# Patient Record
Sex: FEMALE | Race: WHITE | Employment: OTHER | URBAN - METROPOLITAN AREA
[De-identification: names, ages, dates, MRNs, and addresses within clinical notes are randomized per-mention and may not be internally consistent; named-entity substitution may affect disease eponyms.]

---

## 2017-01-13 NOTE — TELEPHONE ENCOUNTER
Refill request for methocarbamol 750 mg, take 2 tabs every 8 hrs as needed, #180, no refills    LOV: 12/19/16  NOV: 3/27/17  Last refilled on 12/16/16

## 2017-01-16 ENCOUNTER — TELEPHONE (OUTPATIENT)
Dept: NEUROLOGY | Facility: CLINIC | Age: 52
End: 2017-01-16

## 2017-01-16 DIAGNOSIS — G89.4 CHRONIC PAIN SYNDROME: ICD-10-CM

## 2017-01-16 DIAGNOSIS — M96.1 LUMBAR POST-LAMINECTOMY SYNDROME: Primary | ICD-10-CM

## 2017-01-16 DIAGNOSIS — M70.62 GREATER TROCHANTERIC BURSITIS OF LEFT HIP: ICD-10-CM

## 2017-01-16 DIAGNOSIS — M25.512 CHRONIC LEFT SHOULDER PAIN: ICD-10-CM

## 2017-01-16 DIAGNOSIS — M75.112 INCOMPLETE TEAR OF LEFT ROTATOR CUFF: ICD-10-CM

## 2017-01-16 DIAGNOSIS — M51.9 LUMBAR DISC DISEASE: ICD-10-CM

## 2017-01-16 DIAGNOSIS — M51.26 LUMBAR HERNIATED DISC: ICD-10-CM

## 2017-01-16 DIAGNOSIS — M54.42 CHRONIC LEFT-SIDED LOW BACK PAIN WITH LEFT-SIDED SCIATICA: ICD-10-CM

## 2017-01-16 DIAGNOSIS — G89.29 CHRONIC LEFT-SIDED LOW BACK PAIN WITH LEFT-SIDED SCIATICA: ICD-10-CM

## 2017-01-16 DIAGNOSIS — M48.061 LUMBAR STENOSIS: ICD-10-CM

## 2017-01-16 DIAGNOSIS — G89.29 CHRONIC LEFT SHOULDER PAIN: ICD-10-CM

## 2017-01-16 DIAGNOSIS — M54.16 LUMBAR RADICULOPATHY: ICD-10-CM

## 2017-01-16 RX ORDER — METHOCARBAMOL 750 MG/1
TABLET, FILM COATED ORAL
Qty: 180 TABLET | Refills: 0 | Status: SHIPPED | OUTPATIENT
Start: 2017-01-16 | End: 2017-02-11

## 2017-01-16 NOTE — TELEPHONE ENCOUNTER
Stefan Vines would like to be able to  scripts for 2-3 months of HYDROcodone-acetaminophen . Dr Jacqueline Padron does approve multiple months for this patient.

## 2017-01-16 NOTE — TELEPHONE ENCOUNTER
#1Drug Norco 10-325mg q6hr prn pain #120-30 3 scripts Three month of scripts provided  Fill date 01/19/17.   Last refill 12/19/16     Last office visit 12/19/16    Next appointment 03/31/17    [place]    #2 Drug Norco 10-325mg q 6hr prn pain #120-

## 2017-01-17 RX ORDER — HYDROCODONE BITARTRATE AND ACETAMINOPHEN 10; 325 MG/1; MG/1
1 TABLET ORAL EVERY 6 HOURS PRN
Qty: 120 TABLET | Refills: 0 | Status: SHIPPED | OUTPATIENT
Start: 2017-02-18 | End: 2017-03-31

## 2017-01-17 RX ORDER — HYDROCODONE BITARTRATE AND ACETAMINOPHEN 10; 325 MG/1; MG/1
1 TABLET ORAL EVERY 6 HOURS PRN
Qty: 120 TABLET | Refills: 0 | Status: SHIPPED | OUTPATIENT
Start: 2017-03-20 | End: 2017-01-27

## 2017-01-17 RX ORDER — HYDROCODONE BITARTRATE AND ACETAMINOPHEN 10; 325 MG/1; MG/1
1 TABLET ORAL EVERY 6 HOURS PRN
Qty: 120 TABLET | Refills: 0 | Status: SHIPPED | OUTPATIENT
Start: 2017-01-19 | End: 2017-03-31

## 2017-01-27 ENCOUNTER — OFFICE VISIT (OUTPATIENT)
Dept: SURGERY | Facility: CLINIC | Age: 52
End: 2017-01-27

## 2017-01-27 VITALS
SYSTOLIC BLOOD PRESSURE: 118 MMHG | HEART RATE: 108 BPM | BODY MASS INDEX: 18.4 KG/M2 | WEIGHT: 100 LBS | HEIGHT: 62 IN | RESPIRATION RATE: 18 BRPM | DIASTOLIC BLOOD PRESSURE: 70 MMHG

## 2017-01-27 DIAGNOSIS — G89.29 OTHER CHRONIC PAIN: ICD-10-CM

## 2017-01-27 DIAGNOSIS — M96.1 FAILED BACK SURGICAL SYNDROME: Primary | ICD-10-CM

## 2017-01-27 DIAGNOSIS — Z98.890 S/P INSERTION OF INTRATHECAL PUMP: ICD-10-CM

## 2017-01-27 PROCEDURE — 99214 OFFICE O/P EST MOD 30 MIN: CPT | Performed by: PHYSICIAN ASSISTANT

## 2017-01-27 RX ORDER — CHLORHEXIDINE GLUCONATE 0.12 MG/ML
RINSE ORAL
COMMUNITY
Start: 2017-01-06 | End: 2017-03-31 | Stop reason: ALTCHOICE

## 2017-01-27 NOTE — PROGRESS NOTES
Name: Mariano Grande   : 10/21/1965   DOS: 2017     Pain Clinic Follow Up Visit:   Patient presents with: Follow - Up: pump adj      Mariano Grande is a 46year old female who presents for recheck of chronic pain.   Patient has history of failed Oral Tab Take 1 tablet by mouth daily. Disp:  Rfl:    Multiple Vitamin (MULTI-VITAMINS) Oral Tab Take 1 tablet by mouth daily.    Disp:  Rfl:          EXAM:   /70 mmHg  Pulse 108  Resp 18  Ht 62\"  Wt 100 lb  BMI 18.29 kg/m2  General:  Patient is a(n)

## 2017-01-27 NOTE — PATIENT INSTRUCTIONS
Refill policies:    • Allow 2 business days for refills; controlled substances may take longer.   • Contact your pharmacy at least 5 days prior to running out of medication and have them send an electronic request or submit request through the “request re your physician has recommended that you have a procedure or additional testing performed. [facility]) will contact your insurance carrier to obtain pre-certification or prior authorization.     Unfortunately, NATTY has seen an increas

## 2017-02-13 RX ORDER — DIAZEPAM 10 MG/1
TABLET ORAL
Qty: 120 TABLET | Refills: 0 | OUTPATIENT
Start: 2017-02-13 | End: 2017-06-06

## 2017-02-13 RX ORDER — METHOCARBAMOL 750 MG/1
TABLET, FILM COATED ORAL
Qty: 180 TABLET | Refills: 0 | Status: SHIPPED | OUTPATIENT
Start: 2017-02-13 | End: 2017-03-12

## 2017-02-13 NOTE — TELEPHONE ENCOUNTER
Methocarbamol 750mg #180 approved by Dr. Teresa Huerta and e-scribed to [facility]  Diazepam 10mg #120 r-0 approved by Dr. Teresa Huerta and called in to [facility]

## 2017-02-13 NOTE — TELEPHONE ENCOUNTER
Refill request for diazepam 10 mg, QID PRN, #120, no refills -- last [place]   Refill request for methocarbamol 750 mg, take 2 tabs every 8 hrs as needed, #180, no refills    LOV: 12/19/16  NOV: 3/31/17

## 2017-02-21 ENCOUNTER — TELEPHONE (OUTPATIENT)
Dept: NEUROLOGY | Facility: CLINIC | Age: 52
End: 2017-02-21

## 2017-02-22 NOTE — TELEPHONE ENCOUNTER
Received notice from [facility] that Methocarbamol is non formulary on the insurance plan.   Current formulary alternatives are Cyclobenzaprine, Baclofen and Tizanidine tabs    Patient informed of the above and says Cyclobenzaprine gives patient upset sto

## 2017-03-02 ENCOUNTER — TELEPHONE (OUTPATIENT)
Dept: SURGERY | Facility: CLINIC | Age: 52
End: 2017-03-02

## 2017-03-02 DIAGNOSIS — G89.29 OTHER CHRONIC PAIN: Primary | ICD-10-CM

## 2017-03-03 NOTE — TELEPHONE ENCOUNTER
Spoke w/ Destin Griffin rep and informed of procedure date and time and request for his presence due to concentration change. Maksim Velásquez confirmed date and time of procedure, no further needs.

## 2017-03-03 NOTE — TELEPHONE ENCOUNTER
Case request placed in OR schedule for 3/9/17 @ 2:45pm arriving at 1:45pm.  [employer] rep notified. Rx faxed to compounded. Hard copy over-nighted to pharmacy by .   Pt to be called and informed of procedure date confirmation and instruc

## 2017-03-03 NOTE — TELEPHONE ENCOUNTER
Pt is on IT morphine: 10mg/ml morphine concentration: dose is currently 2.603 mg continuous dosing with PTMs to 8 per day of 0.100 mg for total dose of 3.388 mg/day. Please make sure mitzi is there for this pump refill as we are changing the concentration.

## 2017-03-03 NOTE — TELEPHONE ENCOUNTER
Patient made aware of date and time of ITP Refill. Pre-procedural instructions reviewed. Patient verbalized understanding. No further needs.

## 2017-03-03 NOTE — TELEPHONE ENCOUNTER
Spoke w/ pt regarding pump refill. Pt states she did not f/u with Dr. Malachi Diaz office for pump d/t \"change in management\" and that Dr. Jim Fang was to [facility] refill. \"  Pt states her pump now says 3/13/17 as alarm date.   Informed pt of refill process and that

## 2017-03-09 ENCOUNTER — HOSPITAL ENCOUNTER (OUTPATIENT)
Facility: HOSPITAL | Age: 52
Setting detail: HOSPITAL OUTPATIENT SURGERY
Discharge: HOME | End: 2017-03-09
Attending: ANESTHESIOLOGY | Admitting: ANESTHESIOLOGY
Payer: MEDICARE

## 2017-03-09 ENCOUNTER — SURGERY (OUTPATIENT)
Age: 52
End: 2017-03-09

## 2017-03-09 VITALS
SYSTOLIC BLOOD PRESSURE: 137 MMHG | DIASTOLIC BLOOD PRESSURE: 69 MMHG | TEMPERATURE: 98 F | HEART RATE: 83 BPM | RESPIRATION RATE: 18 BRPM | OXYGEN SATURATION: 96 %

## 2017-03-09 DIAGNOSIS — G89.29 OTHER CHRONIC PAIN: ICD-10-CM

## 2017-03-09 PROCEDURE — 4B01XVZ MEASUREMENT OF PERIPHERAL NERVOUS STIMULATOR, EXTERNAL APPROACH: ICD-10-PCS | Performed by: ANESTHESIOLOGY

## 2017-03-09 RX ORDER — LIDOCAINE HYDROCHLORIDE 10 MG/ML
INJECTION, SOLUTION EPIDURAL; INFILTRATION; INTRACAUDAL; PERINEURAL AS NEEDED
Status: DISCONTINUED | OUTPATIENT
Start: 2017-03-09 | End: 2017-03-09 | Stop reason: HOSPADM

## 2017-03-09 NOTE — OPERATIVE REPORT
[facility]  Operative Report  3/9/2017     Sergei Miller Patient Status:  Hospital Outpatient Surgery    10/21/1965 MRN [id number]   Location [address] [facility] Attending Barney Tiwari MD   Hosp Day # 0 PCP MD Priya Palmer

## 2017-03-09 NOTE — H&P
History & Physical Examination    Patient Name: Arnol Gaviria  MRN: [id number]  CSN: [id number]  YOB: 1965    Pre-Operative Diagnosis:  Other chronic pain [G89.29]    Present Illness: A 46year old female with chronic pain is here for IT pu • Borderline diabetic    • Fibromyalgia    • Neuropathy (HCC)      hands, left leg worse than right   • Visual impairment      contacts/glasses   • Back problem    • Osteoarthritis      everywhere including tail bone         Past Surgical History    BACK Cancer Mother    • Diabetes Mother    • Arthritis Mother    • [place] Mother      shi's syndrom   • other[other] [OTHER] Mother      CREST syndrome   • Alcohol and Other Disorders Associated Brother    • Diabetes Other      Family h/o Diabe

## 2017-03-13 NOTE — TELEPHONE ENCOUNTER
Refill request for methocarbamol 750 mg, take 2 tabs every 8 hrs as needed, #180, no refills    LOV: 12/19/16  NOV: 3/31/17

## 2017-03-14 RX ORDER — METHOCARBAMOL 750 MG/1
TABLET, FILM COATED ORAL
Qty: 180 TABLET | Refills: 0 | Status: SHIPPED | OUTPATIENT
Start: 2017-03-14 | End: 2017-04-11

## 2017-03-31 ENCOUNTER — OFFICE VISIT (OUTPATIENT)
Dept: NEUROLOGY | Facility: CLINIC | Age: 52
End: 2017-03-31

## 2017-03-31 VITALS
DIASTOLIC BLOOD PRESSURE: 66 MMHG | HEART RATE: 107 BPM | HEIGHT: 62 IN | SYSTOLIC BLOOD PRESSURE: 114 MMHG | RESPIRATION RATE: 13 BRPM | OXYGEN SATURATION: 93 %

## 2017-03-31 DIAGNOSIS — M51.26 LUMBAR HERNIATED DISC: ICD-10-CM

## 2017-03-31 DIAGNOSIS — G89.4 CHRONIC PAIN SYNDROME: Primary | ICD-10-CM

## 2017-03-31 DIAGNOSIS — M70.62 GREATER TROCHANTERIC BURSITIS OF LEFT HIP: ICD-10-CM

## 2017-03-31 DIAGNOSIS — M51.9 LUMBAR DISC DISEASE: ICD-10-CM

## 2017-03-31 DIAGNOSIS — M54.16 LUMBAR RADICULOPATHY: ICD-10-CM

## 2017-03-31 DIAGNOSIS — M96.1 FAILED BACK SYNDROME OF LUMBAR SPINE: ICD-10-CM

## 2017-03-31 DIAGNOSIS — M48.061 LUMBAR STENOSIS: ICD-10-CM

## 2017-03-31 PROCEDURE — 99214 OFFICE O/P EST MOD 30 MIN: CPT | Performed by: PHYSICAL MEDICINE & REHABILITATION

## 2017-03-31 PROCEDURE — 20610 DRAIN/INJ JOINT/BURSA W/O US: CPT | Performed by: PHYSICAL MEDICINE & REHABILITATION

## 2017-03-31 RX ORDER — LIDOCAINE HYDROCHLORIDE 10 MG/ML
2.5 INJECTION, SOLUTION INFILTRATION; PERINEURAL ONCE
Status: COMPLETED | OUTPATIENT
Start: 2017-03-31 | End: 2017-03-31

## 2017-03-31 RX ORDER — HYDROCODONE BITARTRATE AND ACETAMINOPHEN 10; 325 MG/1; MG/1
1 TABLET ORAL EVERY 6 HOURS PRN
Qty: 120 TABLET | Refills: 0 | Status: SHIPPED | OUTPATIENT
Start: 2017-04-20 | End: 2017-04-20

## 2017-03-31 RX ORDER — HYDROCODONE BITARTRATE AND ACETAMINOPHEN 10; 325 MG/1; MG/1
1 TABLET ORAL EVERY 6 HOURS PRN
Qty: 120 TABLET | Refills: 0 | Status: SHIPPED | OUTPATIENT
Start: 2017-05-20 | End: 2017-04-20

## 2017-03-31 RX ORDER — TRIAMCINOLONE ACETONIDE 40 MG/ML
60 INJECTION, SUSPENSION INTRA-ARTICULAR; INTRAMUSCULAR ONCE
Status: COMPLETED | OUTPATIENT
Start: 2017-03-31 | End: 2017-03-31

## 2017-03-31 RX ORDER — HYDROCODONE BITARTRATE AND ACETAMINOPHEN 10; 325 MG/1; MG/1
TABLET ORAL EVERY 6 HOURS PRN
Refills: 0 | COMMUNITY
Start: 2017-03-21 | End: 2017-03-31

## 2017-03-31 RX ORDER — HYDROCODONE BITARTRATE AND ACETAMINOPHEN 10; 325 MG/1; MG/1
1 TABLET ORAL EVERY 6 HOURS PRN
Qty: 120 TABLET | Refills: 0 | Status: SHIPPED | OUTPATIENT
Start: 2017-06-19 | End: 2017-04-20

## 2017-03-31 NOTE — PROGRESS NOTES
HPI:   Nicole Billy is a 46year old female. Patient presents with:  Hip Pain: pt here for follow up with c/o left hip pain with tailbone burning. walking is difficult due to the hip pain and patient would like to discuss injection.  pt had greater tr Neurological: Positive for tingling, weakness and numbness. Psychiatric/Behavioral: Negative.         History:     Past Medical History   Diagnosis Date   • Chronic pain    • Failed back syndrome      per NextGen:  \"degenerative disc disease and failed b Comment per NextGen:  \"removal of pain med pump\"    OTHER SURGICAL HISTORY      Comment per NextGen:  \"Arthrocentesis of the left shoulder joint\" x2    OTHER SURGICAL HISTORY      Comment per NextGen:  \"Arthrocentesis of the left shoulder bicipital t [START ON 5/20/2017] HYDROcodone-acetaminophen  MG Oral Tab Take 1 tablet by mouth every 6 (six) hours as needed for Pain.  Disp: 120 tablet Rfl: 0   [START ON 6/19/2017] HYDROcodone-acetaminophen  MG Oral Tab Take 1 tablet by mouth every 6 (six Hip flexion 4-/5  GM 4/5-    Left toe strength diminished 1+ compared to right @2+    Neurological Lower Extremity:    Light Touch Sensation: Intact in bilateral LE except:left medial aspect of foot and left medial calf   LE Muscle Strength:  All LE strengt

## 2017-03-31 NOTE — PATIENT INSTRUCTIONS
1. Left hip bursa injection today  2. Call 2 weeks for progress report  3. If min or no improvement post injection consider additional PT for hip and coccyx exercises  4. Return visit 3 months or sooner  5.  May try \"donut\" for sitting to alleviate some disc and procedure days in the hospitals. · Patient must present photo ID at time of . If a designated family member will be picking up prescription, office must be given name of individual in advance and they must present an ID as well.   · The name of

## 2017-04-03 ENCOUNTER — MED REC SCAN ONLY (OUTPATIENT)
Dept: NEUROLOGY | Facility: CLINIC | Age: 52
End: 2017-04-03

## 2017-04-11 RX ORDER — METHOCARBAMOL 750 MG/1
TABLET, FILM COATED ORAL
Qty: 180 TABLET | Refills: 0 | Status: SHIPPED | OUTPATIENT
Start: 2017-04-11 | End: 2017-05-14

## 2017-04-11 NOTE — PROCEDURES
The patient is here for a left greater trochanteric bursal injection. The left greater trochanteric bursa was identified via palpation and a polina was placed on the patient's skin. The skin was cleaned with alcohol swabs x 3 and anesthetized with ethyl chlo

## 2017-04-11 NOTE — TELEPHONE ENCOUNTER
Refill request for methocarbamol 750 mg, take 2 tabs every 8 hrs as needed, #180, no refills    LOV: 3/31/17  NOV: 7/17/17

## 2017-04-20 ENCOUNTER — TELEPHONE (OUTPATIENT)
Dept: NEUROLOGY | Facility: CLINIC | Age: 52
End: 2017-04-20

## 2017-04-20 DIAGNOSIS — G89.4 CHRONIC PAIN SYNDROME: Primary | ICD-10-CM

## 2017-04-20 RX ORDER — HYDROCODONE BITARTRATE AND ACETAMINOPHEN 10; 325 MG/1; MG/1
1 TABLET ORAL EVERY 6 HOURS PRN
Qty: 120 TABLET | Refills: 0 | Status: ON HOLD | OUTPATIENT
Start: 2017-05-20 | End: 2017-06-12

## 2017-04-20 RX ORDER — HYDROCODONE BITARTRATE AND ACETAMINOPHEN 10; 325 MG/1; MG/1
1 TABLET ORAL EVERY 6 HOURS PRN
Qty: 120 TABLET | Refills: 0 | Status: SHIPPED | OUTPATIENT
Start: 2017-06-19 | End: 2017-06-06

## 2017-04-20 RX ORDER — HYDROCODONE BITARTRATE AND ACETAMINOPHEN 10; 325 MG/1; MG/1
1 TABLET ORAL EVERY 6 HOURS PRN
Qty: 120 TABLET | Refills: 0 | Status: SHIPPED | OUTPATIENT
Start: 2017-04-20 | End: 2017-07-17

## 2017-04-20 NOTE — TELEPHONE ENCOUNTER
Received call from [facility] pharmacist at [facility] who states that they are unable to fill Norco 10-325mg #120 at this time since CECILE Snider is not authorized to write scheduled II drugs.  Per Bebeto Rivero can write for schedule III, IV

## 2017-04-21 NOTE — TELEPHONE ENCOUNTER
Patient returned 3 prescriptions for Norco 10-325mg #120 dated 4/20/17, 5/20/17 and 6/19/17 written by CECILE Gandhi. 3 Prescriptions were shredded appropriately.     Patient given 3 prescriptions written by Dr Mei Wang for Norco 10-325mg #120 dated 4/

## 2017-04-21 NOTE — TELEPHONE ENCOUNTER
Spoke to patient and informed her she will need to return 3 prescriptions dated 4/20/17, 5/20/17 and 6/19/17 for Norco 10-325mg in order to get updated prescriptions. Apologies offered to patient for any inconvenience.  Patient verbalized understanding and

## 2017-05-02 ENCOUNTER — TELEPHONE (OUTPATIENT)
Dept: SURGERY | Facility: CLINIC | Age: 52
End: 2017-05-02

## 2017-05-02 DIAGNOSIS — Z97.8 PRESENCE OF INTRATHECAL PUMP: Primary | ICD-10-CM

## 2017-05-02 NOTE — TELEPHONE ENCOUNTER
ITP PUMP REFILL due by 6/20/17   Please set up Rx for ITP REFILL:  morphINE Sulfate (PF) 800 mg in Sodium Chloride 40 mL IT infusion

## 2017-05-05 NOTE — TELEPHONE ENCOUNTER
Spoke with patient and informed patient of message below. Pt verbalized understanding and scheduled for 6/19/17 @ 2:30pm.  Pt declines review of pre-procedural instructions. Informed pt of need for gowning. Pt verbalized understanding and appreication.  Earnstine Beverage

## 2017-05-08 NOTE — TELEPHONE ENCOUNTER
[employer] rep notified of scheduled procedure date & time. Procedure added to rep calendar. No further questions.

## 2017-05-15 RX ORDER — METHOCARBAMOL 750 MG/1
TABLET, FILM COATED ORAL
Qty: 180 TABLET | Refills: 0 | Status: ON HOLD | OUTPATIENT
Start: 2017-05-15 | End: 2017-06-12

## 2017-06-05 ENCOUNTER — HOSPITAL ENCOUNTER (OUTPATIENT)
Dept: GENERAL RADIOLOGY | Age: 52
Discharge: HOME | End: 2017-06-05
Attending: NEUROLOGICAL SURGERY
Payer: MEDICARE

## 2017-06-05 ENCOUNTER — TELEPHONE (OUTPATIENT)
Dept: SURGERY | Facility: CLINIC | Age: 52
End: 2017-06-05

## 2017-06-05 DIAGNOSIS — Z98.890 S/P INSERTION OF INTRATHECAL PUMP: ICD-10-CM

## 2017-06-05 DIAGNOSIS — Z98.890 S/P INSERTION OF INTRATHECAL PUMP: Primary | ICD-10-CM

## 2017-06-05 PROCEDURE — 72100 X-RAY EXAM L-S SPINE 2/3 VWS: CPT | Performed by: NEUROLOGICAL SURGERY

## 2017-06-05 PROCEDURE — 72072 X-RAY EXAM THORAC SPINE 3VWS: CPT | Performed by: NEUROLOGICAL SURGERY

## 2017-06-05 PROCEDURE — 74000 XR ABDOMEN (1 VIEW) (CPT=74000): CPT | Performed by: NEUROLOGICAL SURGERY

## 2017-06-05 NOTE — TELEPHONE ENCOUNTER
Spoke with  who stated patient will need to complete Abdominal KUB, lumbar and thoracic xrays prior to appointment tomorrow. Patient informed of the above and understood. Patient will complete prior to appointment.  No further questions at [facility]

## 2017-06-05 NOTE — TELEPHONE ENCOUNTER
Patient placed on the schedule tomorrow 6/6/17 to be evaluated at 11:30 am. Patient is having issues with pain pump.

## 2017-06-06 ENCOUNTER — OFFICE VISIT (OUTPATIENT)
Dept: SURGERY | Facility: CLINIC | Age: 52
End: 2017-06-06

## 2017-06-06 ENCOUNTER — TELEPHONE (OUTPATIENT)
Dept: SURGERY | Facility: CLINIC | Age: 52
End: 2017-06-06

## 2017-06-06 VITALS
RESPIRATION RATE: 18 BRPM | SYSTOLIC BLOOD PRESSURE: 116 MMHG | DIASTOLIC BLOOD PRESSURE: 82 MMHG | TEMPERATURE: 98 F | HEART RATE: 64 BPM

## 2017-06-06 DIAGNOSIS — G89.4 CHRONIC PAIN SYNDROME: ICD-10-CM

## 2017-06-06 DIAGNOSIS — Z98.890 S/P INSERTION OF INTRATHECAL PUMP: Primary | ICD-10-CM

## 2017-06-06 DIAGNOSIS — Z97.8 PRESENCE OF INTRATHECAL PUMP: ICD-10-CM

## 2017-06-06 PROCEDURE — 99213 OFFICE O/P EST LOW 20 MIN: CPT | Performed by: NEUROLOGICAL SURGERY

## 2017-06-06 RX ORDER — MORPHINE SULFATE 15 MG/1
15 TABLET ORAL EVERY 4 HOURS PRN
Qty: 180 TABLET | Refills: 0 | Status: ON HOLD | OUTPATIENT
Start: 2017-06-06 | End: 2017-06-12

## 2017-06-06 NOTE — TELEPHONE ENCOUNTER
[address] regarding Morphine for IT pump implant for 6/9/17  They can make up the medication dose and send it out to us today

## 2017-06-06 NOTE — PATIENT INSTRUCTIONS
Refill policies:    • Allow 2-3 business days for refills; controlled substances may take longer.   • Contact your pharmacy at least 5 days prior to running out of medication and have them send an electronic request or submit request through the [facility] have a procedure or additional testing performed. [facility]) will contact your insurance carrier to obtain pre-certification or prior authorization.     Unfortunately, NATTY has seen an increase in denial of payment even though the p

## 2017-06-06 NOTE — H&P
Neurosurgery Clinic Visit  2017    Nicole Billy PCP:  Sonja Zaldivar MD    10/21/1965 MRN [id number]     HISTORY OF PRESENT ILLNESS:  Nicole Billy is a(n) 46year old female here for follow-up regarding her pump  Patient noted on  she

## 2017-06-06 NOTE — PROGRESS NOTES
Pt is feeling nauseous since Friday and lightheaded and significant increase in her low back pain. Dr Wheeler Laughter wants her to use Morphine sulfate IR 15 mg q 4 hours. She feels like she will throw up each time she tries to eat.  She drink ensure and feels very na

## 2017-06-06 NOTE — TELEPHONE ENCOUNTER
You are scheduled for a Intrathecal pump implant on 6/9/17 with     · Contact the pre-admissions department at [phone] to speak with a nurse  · You will need to have some blood work done for surgery  · Nothing to eat or drink midnight the n

## 2017-06-07 ENCOUNTER — TELEPHONE (OUTPATIENT)
Dept: SURGERY | Facility: CLINIC | Age: 52
End: 2017-06-07

## 2017-06-07 NOTE — TELEPHONE ENCOUNTER
Spoke w/ Jeane Kuo PA-C regarding message below. Per provider, pt not to receive new Rx for Morphine IR 30 mg at this time as pt received tablets of Morphine IR 15mg and provider concerned about dosing confusion with both medications.  Pt to call office when R

## 2017-06-07 NOTE — TELEPHONE ENCOUNTER
Per ALEXEY Almaraz, for PT ITP PUMP DOSING:   Patient to receive:  Morphine 10mg/mL in 40mL; dosing of 1mg/day.

## 2017-06-07 NOTE — TELEPHONE ENCOUNTER
Spoke w/ Rose at [facility] pharmacy and informed of provider feedback. States medication not likely to be in stock by then as currently on \"back order without a date. \"  Verbalized understanding of plan. LMTCB on pt phone.   Pt to be informed o

## 2017-06-07 NOTE — TELEPHONE ENCOUNTER
Pt calling, wanting to know if orders were placed for labs, she is on her way to get labs done for surgery. Informed her, I would place lab orders and they would be ready by the time she got to the lab.  Orders are placed

## 2017-06-07 NOTE — TELEPHONE ENCOUNTER
Spoke with patient's pharmacist-Rose with [facility] who stated the Morphine IR 15 mg are currently on back order. Rose wanted to know if patient erika be prescribed Morphine IR 30 mg and cut the tablets in half.  Informed Rose I would have to dis

## 2017-06-08 ENCOUNTER — TELEPHONE (OUTPATIENT)
Dept: SURGERY | Facility: CLINIC | Age: 52
End: 2017-06-08

## 2017-06-08 ENCOUNTER — LAB ENCOUNTER (OUTPATIENT)
Dept: LAB | Age: 52
End: 2017-06-08
Attending: NEUROLOGICAL SURGERY
Payer: MEDICARE

## 2017-06-08 DIAGNOSIS — G89.4 CHRONIC PAIN DISORDER: ICD-10-CM

## 2017-06-08 DIAGNOSIS — Z98.890 S/P INSERTION OF INTRATHECAL PUMP: Primary | ICD-10-CM

## 2017-06-08 DIAGNOSIS — Z01.818 PREOP TESTING: ICD-10-CM

## 2017-06-08 DIAGNOSIS — M96.1 FAILED BACK SYNDROME OF LUMBAR SPINE: Primary | ICD-10-CM

## 2017-06-08 PROCEDURE — 85730 THROMBOPLASTIN TIME PARTIAL: CPT

## 2017-06-08 PROCEDURE — 85025 COMPLETE CBC W/AUTO DIFF WBC: CPT

## 2017-06-08 PROCEDURE — 85610 PROTHROMBIN TIME: CPT

## 2017-06-08 PROCEDURE — 36415 COLL VENOUS BLD VENIPUNCTURE: CPT

## 2017-06-08 PROCEDURE — 80048 BASIC METABOLIC PNL TOTAL CA: CPT

## 2017-06-08 RX ORDER — DOCUSATE SODIUM 100 MG/1
100 CAPSULE, LIQUID FILLED ORAL DAILY
COMMUNITY
End: 2017-10-11 | Stop reason: ALTCHOICE

## 2017-06-09 ENCOUNTER — ANESTHESIA EVENT (OUTPATIENT)
Dept: SURGERY | Facility: HOSPITAL | Age: 52
End: 2017-06-09
Payer: MEDICARE

## 2017-06-09 ENCOUNTER — ANESTHESIA (OUTPATIENT)
Dept: SURGERY | Facility: HOSPITAL | Age: 52
End: 2017-06-09
Payer: MEDICARE

## 2017-06-09 ENCOUNTER — APPOINTMENT (OUTPATIENT)
Dept: GENERAL RADIOLOGY | Facility: HOSPITAL | Age: 52
End: 2017-06-09
Attending: NEUROLOGICAL SURGERY
Payer: MEDICARE

## 2017-06-09 ENCOUNTER — SURGERY (OUTPATIENT)
Age: 52
End: 2017-06-09

## 2017-06-09 ENCOUNTER — HOSPITAL ENCOUNTER (OUTPATIENT)
Facility: HOSPITAL | Age: 52
Discharge: HOME | End: 2017-06-12
Attending: NEUROLOGICAL SURGERY | Admitting: NEUROLOGICAL SURGERY
Payer: MEDICARE

## 2017-06-09 DIAGNOSIS — G89.4 CHRONIC PAIN SYNDROME: ICD-10-CM

## 2017-06-09 DIAGNOSIS — Z98.890 S/P INSERTION OF INTRATHECAL PUMP: ICD-10-CM

## 2017-06-09 PROCEDURE — 62350 IMPLANT SPINAL CANAL CATH: CPT | Performed by: PHYSICIAN ASSISTANT

## 2017-06-09 PROCEDURE — 0JWT0VZ REVISION OF INFUSION PUMP IN TRUNK SUBCUTANEOUS TISSUE AND FASCIA, OPEN APPROACH: ICD-10-PCS | Performed by: NEUROLOGICAL SURGERY

## 2017-06-09 PROCEDURE — 76001 XR FLUOROSCOPE EXAM >1 HR EXTENSIVE (CPT=76001): CPT | Performed by: NEUROLOGICAL SURGERY

## 2017-06-09 PROCEDURE — 00WU03Z REVISION OF INFUSION DEVICE IN SPINAL CANAL, OPEN APPROACH: ICD-10-PCS | Performed by: NEUROLOGICAL SURGERY

## 2017-06-09 RX ORDER — HYDROCODONE BITARTRATE AND ACETAMINOPHEN 10; 325 MG/1; MG/1
2 TABLET ORAL EVERY 4 HOURS PRN
Status: DISCONTINUED | OUTPATIENT
Start: 2017-06-09 | End: 2017-06-12

## 2017-06-09 RX ORDER — DIPHENHYDRAMINE HYDROCHLORIDE 50 MG/ML
25 INJECTION INTRAMUSCULAR; INTRAVENOUS EVERY 4 HOURS PRN
Status: DISCONTINUED | OUTPATIENT
Start: 2017-06-09 | End: 2017-06-10

## 2017-06-09 RX ORDER — METHOCARBAMOL 750 MG/1
750 TABLET, FILM COATED ORAL EVERY 8 HOURS PRN
COMMUNITY
End: 2017-10-11 | Stop reason: ALTCHOICE

## 2017-06-09 RX ORDER — BACITRACIN 50000 [USP'U]/1
INJECTION, POWDER, LYOPHILIZED, FOR SOLUTION INTRAMUSCULAR AS NEEDED
Status: DISCONTINUED | OUTPATIENT
Start: 2017-06-09 | End: 2017-06-09 | Stop reason: HOSPADM

## 2017-06-09 RX ORDER — HYDROCODONE BITARTRATE AND ACETAMINOPHEN 10; 325 MG/1; MG/1
1 TABLET ORAL EVERY 4 HOURS PRN
Status: DISCONTINUED | OUTPATIENT
Start: 2017-06-09 | End: 2017-06-12

## 2017-06-09 RX ORDER — ESTRADIOL 1 MG/1
1 TABLET ORAL DAILY
Status: DISCONTINUED | OUTPATIENT
Start: 2017-06-10 | End: 2017-06-12

## 2017-06-09 RX ORDER — HYDROMORPHONE HYDROCHLORIDE 1 MG/ML
0.2 INJECTION, SOLUTION INTRAMUSCULAR; INTRAVENOUS; SUBCUTANEOUS EVERY 2 HOUR PRN
Status: DISCONTINUED | OUTPATIENT
Start: 2017-06-09 | End: 2017-06-11

## 2017-06-09 RX ORDER — HYDROMORPHONE HYDROCHLORIDE 1 MG/ML
0.8 INJECTION, SOLUTION INTRAMUSCULAR; INTRAVENOUS; SUBCUTANEOUS EVERY 2 HOUR PRN
Status: DISCONTINUED | OUTPATIENT
Start: 2017-06-09 | End: 2017-06-10

## 2017-06-09 RX ORDER — DOCUSATE SODIUM 100 MG/1
100 CAPSULE, LIQUID FILLED ORAL 2 TIMES DAILY
Status: DISCONTINUED | OUTPATIENT
Start: 2017-06-09 | End: 2017-06-12

## 2017-06-09 RX ORDER — HYDROMORPHONE HYDROCHLORIDE 1 MG/ML
0.4 INJECTION, SOLUTION INTRAMUSCULAR; INTRAVENOUS; SUBCUTANEOUS EVERY 2 HOUR PRN
Status: DISCONTINUED | OUTPATIENT
Start: 2017-06-09 | End: 2017-06-11

## 2017-06-09 RX ORDER — SODIUM CHLORIDE, SODIUM LACTATE, POTASSIUM CHLORIDE, CALCIUM CHLORIDE 600; 310; 30; 20 MG/100ML; MG/100ML; MG/100ML; MG/100ML
INJECTION, SOLUTION INTRAVENOUS CONTINUOUS
Status: DISCONTINUED | OUTPATIENT
Start: 2017-06-09 | End: 2017-06-12

## 2017-06-09 RX ORDER — METHOCARBAMOL 750 MG/1
750 TABLET, FILM COATED ORAL 3 TIMES DAILY PRN
Status: DISCONTINUED | OUTPATIENT
Start: 2017-06-09 | End: 2017-06-12

## 2017-06-09 RX ORDER — MORPHINE SULFATE 2 MG/ML
INJECTION, SOLUTION INTRAMUSCULAR; INTRAVENOUS
Status: COMPLETED
Start: 2017-06-09 | End: 2017-06-09

## 2017-06-09 RX ORDER — DIPHENHYDRAMINE HYDROCHLORIDE 50 MG/ML
12.5 INJECTION INTRAMUSCULAR; INTRAVENOUS AS NEEDED
Status: DISCONTINUED | OUTPATIENT
Start: 2017-06-09 | End: 2017-06-09 | Stop reason: HOSPADM

## 2017-06-09 RX ORDER — ACETAMINOPHEN 325 MG/1
650 TABLET ORAL EVERY 4 HOURS PRN
Status: DISCONTINUED | OUTPATIENT
Start: 2017-06-09 | End: 2017-06-12

## 2017-06-09 RX ORDER — TRIAMTERENE AND HYDROCHLOROTHIAZIDE 37.5; 25 MG/1; MG/1
1 CAPSULE ORAL DAILY
Status: DISCONTINUED | OUTPATIENT
Start: 2017-06-10 | End: 2017-06-12

## 2017-06-09 RX ORDER — MORPHINE SULFATE 15 MG/1
15 TABLET ORAL EVERY 4 HOURS PRN
Status: DISCONTINUED | OUTPATIENT
Start: 2017-06-09 | End: 2017-06-11

## 2017-06-09 RX ORDER — SODIUM CHLORIDE AND POTASSIUM CHLORIDE .9; .15 G/100ML; G/100ML
SOLUTION INTRAVENOUS CONTINUOUS
Status: DISCONTINUED | OUTPATIENT
Start: 2017-06-09 | End: 2017-06-12

## 2017-06-09 RX ORDER — MORPHINE SULFATE 2 MG/ML
2 INJECTION, SOLUTION INTRAMUSCULAR; INTRAVENOUS EVERY 5 MIN PRN
Status: DISCONTINUED | OUTPATIENT
Start: 2017-06-09 | End: 2017-06-09 | Stop reason: HOSPADM

## 2017-06-09 RX ORDER — DIPHENHYDRAMINE HCL 25 MG
25 CAPSULE ORAL EVERY 4 HOURS PRN
Status: DISCONTINUED | OUTPATIENT
Start: 2017-06-09 | End: 2017-06-10

## 2017-06-09 RX ORDER — DOXEPIN HYDROCHLORIDE 50 MG/1
1 CAPSULE ORAL DAILY
Status: DISCONTINUED | OUTPATIENT
Start: 2017-06-09 | End: 2017-06-12

## 2017-06-09 RX ORDER — SODIUM CHLORIDE, SODIUM LACTATE, POTASSIUM CHLORIDE, CALCIUM CHLORIDE 600; 310; 30; 20 MG/100ML; MG/100ML; MG/100ML; MG/100ML
INJECTION, SOLUTION INTRAVENOUS CONTINUOUS
Status: DISCONTINUED | OUTPATIENT
Start: 2017-06-09 | End: 2017-06-09

## 2017-06-09 RX ORDER — NALOXONE HYDROCHLORIDE 0.4 MG/ML
80 INJECTION, SOLUTION INTRAMUSCULAR; INTRAVENOUS; SUBCUTANEOUS AS NEEDED
Status: DISCONTINUED | OUTPATIENT
Start: 2017-06-09 | End: 2017-06-09 | Stop reason: HOSPADM

## 2017-06-09 RX ORDER — MEPERIDINE HYDROCHLORIDE 25 MG/ML
12.5 INJECTION INTRAMUSCULAR; INTRAVENOUS; SUBCUTANEOUS AS NEEDED
Status: DISCONTINUED | OUTPATIENT
Start: 2017-06-09 | End: 2017-06-09 | Stop reason: HOSPADM

## 2017-06-09 RX ORDER — MIDAZOLAM HYDROCHLORIDE 1 MG/ML
1 INJECTION INTRAMUSCULAR; INTRAVENOUS EVERY 5 MIN PRN
Status: DISCONTINUED | OUTPATIENT
Start: 2017-06-09 | End: 2017-06-09 | Stop reason: HOSPADM

## 2017-06-09 RX ORDER — ONDANSETRON 2 MG/ML
4 INJECTION INTRAMUSCULAR; INTRAVENOUS AS NEEDED
Status: DISCONTINUED | OUTPATIENT
Start: 2017-06-09 | End: 2017-06-09 | Stop reason: HOSPADM

## 2017-06-09 RX ORDER — ONDANSETRON 2 MG/ML
4 INJECTION INTRAMUSCULAR; INTRAVENOUS EVERY 4 HOURS PRN
Status: DISPENSED | OUTPATIENT
Start: 2017-06-09 | End: 2017-06-10

## 2017-06-09 RX ORDER — POLYETHYLENE GLYCOL 3350 17 G/17G
17 POWDER, FOR SOLUTION ORAL DAILY PRN
Status: DISCONTINUED | OUTPATIENT
Start: 2017-06-09 | End: 2017-06-12

## 2017-06-09 NOTE — BRIEF OP NOTE
Pre-Operative Diagnosis: Chronic pain syndrome [G89.4]  S/P insertion of intrathecal pump [Z98.890]     Post-Operative Diagnosis: Chronic pain syndrome [G89.4]S/P insertion of intrathecal pump [Z98.890]     Procedure Performed:   Procedure(s):  Jose Luis Aldrich

## 2017-06-09 NOTE — H&P (VIEW-ONLY)
Neurosurgery Clinic Visit  2017    Eilseo Dai PCP:  Levi Buenrostro MD    10/21/1965 MRN [id number]     HISTORY OF PRESENT ILLNESS:  Eliseo Dai is a(n) 46year old female here for follow-up regarding her pump  Patient noted on  she

## 2017-06-09 NOTE — OPERATIVE REPORT
Operative Note    Patient Name: Sherrell Sebastian    Preoperative Diagnosis: Chronic pain syndrome [G89.4]  S/P insertion of intrathecal pump [Z98.890], catheter failure    Postoperative Diagnosis: same    Primary Surgeon: Fabiola Pillai    Assistant: as pump, 1 mg/day.

## 2017-06-09 NOTE — INTERVAL H&P NOTE
Past Medical History   Diagnosis Date   • Chronic pain    • Failed back syndrome      per NextGen:  \"degenerative disc disease and failed back syndrome\"   • Anxiety state, unspecified    • Depression    • Arthritis    • Herniated disc    • MRSA (methicil

## 2017-06-09 NOTE — PROGRESS NOTES
This note also relates to the following rows which could not be included:  SpO2 - Cannot attach notes to unvalidated device data  Pulse - Cannot attach notes to unvalidated device data  Resp - Cannot attach notes to unvalidated device data  BP - Cannot att

## 2017-06-09 NOTE — ANESTHESIA POSTPROCEDURE EVALUATION
Ramón Sierra Patient Status:  Surgery Admit   Age/Gender 46year old female MRN [id number]   [facility] SURGERY Attending Agnes Anguiano MD   Hosp Day # 0 PCP River Escalante MD       Anesthesia Post-op Note    Pr

## 2017-06-09 NOTE — ANESTHESIA PREPROCEDURE EVALUATION
PRE-OP EVALUATION    Patient Name: Epi Toure    Pre-op Diagnosis: Chronic pain syndrome [G89.4]  S/P insertion of intrathecal pump [Z98.890]    Procedure(s):  INTRATHECAL PUMP AND CATHETER EXPLORATION AND REVISION, REFILLING OF PUMP  AND ALL INDICATE back syndrome    (+) depression  (+) anxiety                                Past Surgical History    BACK SURGERY      Comment multiple    ANESTH,SKIN SURGERY,NECK      REVISE ULNAR NERVE AT ELBOW      Comment left    HAND/FINGER SURGERY UNLISTED      Comm reviewed.     Lab Results  Component Value Date   WBC 7.5 06/08/2017   RBC 4.87 06/08/2017   HGB 15.3 06/08/2017   HCT 44.3 06/08/2017   MCV 90.9 06/08/2017   MCH 31.5 06/08/2017   MCHC 34.6 06/08/2017   RDW 12.8 06/08/2017    06/08/2017   MPV 8.5 06

## 2017-06-09 NOTE — PROGRESS NOTES
Reviewed pain scale with patient, reviewed pain expectations for today's events. Reminded patient that she will not be \"pain free\" and that her pump will have to be adjusted according to need. Reviewed pain scale.   At this time patient is lying calmly/

## 2017-06-10 RX ORDER — ONDANSETRON 2 MG/ML
4 INJECTION INTRAMUSCULAR; INTRAVENOUS EVERY 4 HOURS PRN
Status: DISCONTINUED | OUTPATIENT
Start: 2017-06-10 | End: 2017-06-12

## 2017-06-10 NOTE — CM/SW NOTE
06/10/17 1800   CM/SW Referral Data   Referral Source Physician   Reason for Referral Discharge planning   Informant Patient;Edward Staff   Pertinent Medical Hx   Primary Care Physician Name dr Celsa Chamberlain History   Recreational Drug/Alcohol Use

## 2017-06-10 NOTE — PHYSICAL THERAPY NOTE
PHYSICAL THERAPY EVALUATION - INPATIENT     Room Number: 376/376-A  Evaluation Date: 6/10/2017  Type of Evaluation: Initial  Physician Order: PT Eval and Treat    Presenting Problem: IT pump catheter migration out of canal, s/p exploration and revision 'Oopherectomy\"    OTHER SURGICAL HISTORY  1994    Comment per NextGen:  \"Lumbar hemilaminectomy L3-4 on the left\"    OTHER SURGICAL HISTORY  1999    Comment per NextGen:  \"L2, L3 partial laminectomy\"    OTHER SURGICAL HISTORY  1999    Comment per Next ASSESSMENT  Upper extremity ROM and strength are within functional limits     Lower extremity ROM: RLE AROM WFL, L hip flex 1/2 AROM, knee ext 3/4 AROM, ankle PF/DF [facility]    Lower extremity strength: RLE WFL; MMT to LLE deferred due to pain but exhibits maru of LLE. Required min A with upper body to complete sup>sit transfer. Required assist to scoot forward on EOB for proper positioning. Sat EOB several minutes, reported onset of nausea and lightheadedness while sitting. /69.  Agreed to transfer to chair mobility; Body mechanics;Gait training;Patient education;Transfer training;Balance training  Rehab Potential : Good  Frequency (Obs): Daily  Number of Visits to Meet Established Goals: 5      CURRENT GOALS    Goal #1 Patient is able to demonstrate supine -

## 2017-06-10 NOTE — PROGRESS NOTES
Pain Service  Patient reports LBP is 7/10 - inadequate pain control with current IT pump settings:  Morphine 10mg/ml concentration at 1mg/ml  Patient received Dilaudid 2mg IV and Norco 10/325 6 tabs in past 24h     Discussed with Dr Michoacano Hansen, will increase IT

## 2017-06-10 NOTE — PROGRESS NOTES
[id number] [facility] Neurosurgery Progress Note    Elizabeth Blevins Patient Status:  Outpatient in a Bed    10/21/1965 MRN [id number]   [facility] 3SW-A Attending Stephy Lyle MD   Hosp Day # 1 PCP Kristian Jo MD     Subjective

## 2017-06-10 NOTE — PROGRESS NOTES
Pain Service  POD1 s/p Intrathecal Pump catheter revision    Patient reports LBP is 7/10 - inadequate pain control with current IT pump settings:  Morphine 10mg/ml concentration at 1mg/ml  Patient received Dilaudid 2mg IV and Norco 10/325 6 tabs in past 24

## 2017-06-10 NOTE — OCCUPATIONAL THERAPY NOTE
OCCUPATIONAL THERAPY EVALUATION - INPATIENT     Room Number: 376/376-A  Evaluation Date: 6/10/2017  Type of Evaluation: Initial       Physician Order: IP Consult to Occupational Therapy  Reason for Therapy: ADL/IADL Dysfunction and Discharge Planning    Hi 'Oopherectomy\"    OTHER SURGICAL HISTORY  1994    Comment per NextGen:  \"Lumbar hemilaminectomy L3-4 on the left\"    OTHER SURGICAL HISTORY  1999    Comment per NextGen:  \"L2, L3 partial laminectomy\"    OTHER SURGICAL HISTORY  1999    Comment per Next promotion    COGNITION  Orientation Level:  oriented x4    VISION  Current Vision: wears glasses all the time    PERCEPTION  Overall Perception Status:   WFL - within functional limits    SENSATION  Light touch:  intact    Communication: WFL    Behavioral/ sit-stand transfer (CGA, cueing for hand placement), functional mobility (CGA w/ rw, bed to chair transfer, 3 ft), grooming (facial hygiene seated in chair w/ s/u, education on keeping spinal precautions during oral and facial hygiene), self feeding (hold with supervision  Pt will verbalize methods of keeping spinal precautions during UB/LB bathing.     Functional Transfer Goals  Patient will transfer from sit-supine w/ supervision and while maintaining back safety precautions  Patient will transfer from sit

## 2017-06-10 NOTE — OCCUPATIONAL THERAPY NOTE
Order was received for OT evaluation. Pt is on bedrest until 12:35pm today. OT will evaluate the patient once activity level is upgraded. Spoke with RN.

## 2017-06-11 NOTE — PLAN OF CARE
Page to Dr. Jim Fang- informed that patient's LOC has greatly improved. AXO X4, minimally sleepy. Ambulates with standby- communicates appropriately.    Complains of 10/10 pain to hip   PO pain medication of norco given for severe breakthrough pain per pain ser

## 2017-06-11 NOTE — PROGRESS NOTES
Pain Service    Assessed pt in chair. Pt asleep initially but wake to voice; asks/answers questions appropriately. Pt states pain is 2-3/10 at this time and reports being comfortable. RN concerned pt is sleepy/drowsy.  Pt denies feeling drowsy but does fall

## 2017-06-11 NOTE — PLAN OF CARE
Patient remains alert and oriented after second norco tablet- some increased sleepiness. However able to have complete conversations, up with standby.    Call from female persons other than sons requesting update on DC plan- patient informed- patient states

## 2017-06-11 NOTE — PLAN OF CARE
DISCHARGE PLANNING    • Discharge to home or other facility with appropriate resources Progressing        Impaired Activities of Daily Living    • Achieve highest/safest level of independence in self care Progressing        Impaired Functional Mobility mangement medications and declines stating that her baseline is to go several days without BM- states that she has a home routine that she will take after discharge and does not want medication here \"I won't go here\" patient states.

## 2017-06-11 NOTE — OCCUPATIONAL THERAPY NOTE
OCCUPATIONAL THERAPY TREATMENT NOTE - INPATIENT     Room Number: 376/376-A  Session: 1  Number of Visits to Meet Established Goals: 3         History related to current admission:Pt s/p 6/9/17 Intrathecal catheter exploration and revision with refilling of OTHER SURGICAL HISTORY  1999    Comment per NextGen:  \"L2, L3 partial laminectomy\"    OTHER SURGICAL HISTORY  1999    Comment per NextGen:  \"Placement of intrathecal morphine pump through rt. \"    [address] per NextG of Impairment: 38.32%  Standardized Score (AM-PAC Scale): 42.03  CMS Modifier (G-Code): CJ    FUNCTIONAL TRANSFER ASSESSMENT  Supine to Sit : Supervision  Sit to Stand: Supervision    Skilled Therapy Provided:Received pt supine in bed.   Education on role o grooming w/ supervision, in stand, keeping spinal precautions-- Progressing  Patient will perform LB dressing w/ supervision and AE as needed, keeping spinal precautions-- Progressing  Patient will perform toileting: with supervision--- Met 6/11  Pt will v

## 2017-06-11 NOTE — PHYSICAL THERAPY NOTE
PHYSICAL THERAPY TREATMENT NOTE - INPATIENT    Room Number: 376/376-A     Session: 1  Number of Visits to Meet Established Goals: 5    Presenting Problem: IT pump catheter migration out of canal, s/p exploration and revision 6/9/17    Problem List  Active Kala [address] per NextGen:  \"low back surgeries x's 8\"    OTHER SURGICAL HISTORY  2010    Comment per NextGen:  \"left shoulder surgery x's 2\"    OTHER SURGICAL HISTORY  2010    Comment per NextGen:  \"ACDF\":    OTHER SURGICA -   Need to walk in hospital room?: A Little   -   Climbing 3-5 steps with a railing?: A Little       AM-PAC Score:  Raw Score: 20   PT Approx Degree of Impairment Score: 35.83%   Standardized Score (AM-PAC Scale): 47.67   CMS Modifier (G-Code): MONIKA SMALLS GOALS      Goal #1  Patient is able to demonstrate supine - sit EOB @ level: supervision---Met 06/11   Goal #2  Patient is able to demonstrate transfers Sit to/from Stand at assistance level: supervision  ----Met 06/11   Goal #3  Patient is able to ambulat

## 2017-06-11 NOTE — PROGRESS NOTES
[id number] [facility] Neurosurgery Progress Note    Jelani Britt Patient Status:  Outpatient in a Bed    10/21/1965 MRN [id number]   [facility] 3SW-A Attending Johnny Leahy MD   Hosp Day # 2 PCP Aureliano Holter, MD     Subjective

## 2017-06-12 VITALS
HEART RATE: 74 BPM | HEIGHT: 62 IN | BODY MASS INDEX: 18.41 KG/M2 | DIASTOLIC BLOOD PRESSURE: 57 MMHG | RESPIRATION RATE: 18 BRPM | SYSTOLIC BLOOD PRESSURE: 101 MMHG | OXYGEN SATURATION: 96 % | TEMPERATURE: 98 F | WEIGHT: 100.06 LBS

## 2017-06-12 PROCEDURE — 62370 ANL SP INF PMP W/MDREPRG&FIL: CPT | Performed by: NURSE PRACTITIONER

## 2017-06-12 PROCEDURE — 99213 OFFICE O/P EST LOW 20 MIN: CPT | Performed by: NURSE PRACTITIONER

## 2017-06-12 RX ORDER — MORPHINE SULFATE 30 MG/1
30 TABLET ORAL EVERY 6 HOURS PRN
Qty: 80 TABLET | Refills: 0 | Status: SHIPPED | OUTPATIENT
Start: 2017-06-12 | End: 2017-07-17

## 2017-06-12 RX ORDER — METHOCARBAMOL 750 MG/1
TABLET, FILM COATED ORAL
Qty: 180 TABLET | Refills: 0 | Status: SHIPPED | OUTPATIENT
Start: 2017-06-12 | End: 2017-06-21

## 2017-06-12 RX ORDER — MORPHINE SULFATE 15 MG/1
15 TABLET ORAL EVERY 4 HOURS PRN
Status: DISCONTINUED | OUTPATIENT
Start: 2017-06-12 | End: 2017-06-12

## 2017-06-12 NOTE — PROGRESS NOTES
06/12/17 0825   Clinical Encounter Type   Referral To (Referral made to [employer] for [address] communion as requested .)   Sacramental Encounters   Communion Patient wants communion

## 2017-06-12 NOTE — CONSULTS
[facility]  Report of Consultation:  Pain Management Service    Roosevelt Misbah Patient Status:  Outpatient in a Bed    10/21/1965 MRN [id number]   [facility] 3SW-A Attending Monica Holloway MD   Hosp Day # 3 PCP Delmar Merritt, HAND/FINGER SURGERY UNLISTED      Comment left hand    CARPAL TUNNEL RELEASE      Comment bilateral    APPENDECTOMY      KNEE SURGERY      Comment right knee    HIT PAIN IMP PUMP DONNY      HYSTERECTOMY      OTHER      Comment anterior removal of cervical i Maternal Grandmother    • RA[other] [OTHER] Paternal Grandmother    • RA[other] [OTHER] Maternal Uncle       reports that she quit smoking about 5 years ago.  She has never used smokeless tobacco. She reports that she does not drink alcohol or use illicit d your patient.   Report to MD Noni Lauren FAINA  [facility] Pain management   6/12/2017  2:49 PM

## 2017-06-12 NOTE — PLAN OF CARE
DISCHARGE PLANNING    • Discharge to home or other facility with appropriate resources Adequate for Discharge        Impaired Functional Mobility    • Achieve highest/safest level of mobility/gait Adequate for Discharge        PAIN - ADULT    • Verbalizes/

## 2017-06-12 NOTE — PHYSICAL THERAPY NOTE
1322: Attempted to see Pt this PM for PT session - Pt currently occupied in bathroom - will re-attempt later today as time permits. 1414: second attempt to see Pt - Pt just returned from bathroom independently with use of RW.   Pt denied need to ambulat

## 2017-06-12 NOTE — PHYSICAL THERAPY NOTE
Acknowledged orders. Pt already on IP PT service, evaluation completed on 6/10/17, first follow-up session on 6/11/17. Pt is scheduled today at 1300 6/12/2017 for PT session.      06/11/17 0248  IP Consult to Physical Therapy Once Start: 06/11/17 7122

## 2017-06-12 NOTE — OCCUPATIONAL THERAPY NOTE
OCCUPATIONAL THERAPY TREATMENT NOTE - INPATIENT     Room Number: 376/376-A  Session: 2   Number of Visits to Meet Established Goals: 3         History related to current admission: Pt s/p 6/9/17 Intrathecal catheter exploration and revision with refilling OTHER SURGICAL HISTORY  1999    Comment per [employer]:  \"L2, L3 partial laminectomy\"    OTHER SURGICAL HISTORY  1999    Comment per Next[employer]:  \"Placement of intrathecal morphine pump through rt. \"    [facility] per Ne Not tested  Sit to Stand: Modified independent    Skilled Therapy Provided: Pt performed sit to stand toilet transfer, toileting, and clothing management independently.   Pt was able to perform oral and facial hygiene care at modified independent level robin

## 2017-06-12 NOTE — CM/SW NOTE
SW informed by [facility] liaison, Ramy Ledesma, that she met with pt and pt [facility].

## 2017-06-12 NOTE — PROGRESS NOTES
[address] [facility] Neurosurgery Progress Note    Sosa Momin Patient Status:  Outpatient in a Bed    10/21/1965 MRN [id number]   [facility] 3SW-A Attending Artemio Adams MD   Hosp Day # 3 PCP Roque Weeks MD     Subjective

## 2017-06-13 NOTE — CM/SW NOTE
06/13/17 0900   Discharge disposition   Discharged to: Home or [address] [place] [address] services after discharge Patient refused services   Discharge transportation Private car   d/c 6/12

## 2017-06-14 NOTE — DISCHARGE SUMMARY
[facility]  Discharge Summary    Kenny Pena Patient Status:  Outpatient in a Bed    10/21/1965 MRN [id number]   [facility] 3SW-A Attending No att. providers found   Hosp Day # 3 PCP Nimo Nichols MD     Date of Admission:  bursitis of left hip     Malfunction of intrathecal infusion pump   Chronic pain syndrome [G89.4]S/P insertion of intrathecal pump [Z98.890]    Reason for Admission: Patient noted on Sunday she had a bump by her posterior incision  Started having some bad these medications    HYDROcodone-acetaminophen  MG Oral Tab          Follow up Visits: Future Appointments  Date Time Provider Sherrell Gallagher   6/21/2017 10:30 AM Jenny Jenkins MD [facility]   6/22/2017 11:30 AM Sofy Bustillo,

## 2017-06-21 ENCOUNTER — OFFICE VISIT (OUTPATIENT)
Dept: SURGERY | Facility: CLINIC | Age: 52
End: 2017-06-21

## 2017-06-21 VITALS
HEART RATE: 67 BPM | BODY MASS INDEX: 18.4 KG/M2 | RESPIRATION RATE: 16 BRPM | HEIGHT: 62 IN | OXYGEN SATURATION: 97 % | WEIGHT: 100 LBS

## 2017-06-21 DIAGNOSIS — M96.1 FAILED BACK SURGICAL SYNDROME: Primary | ICD-10-CM

## 2017-06-21 PROCEDURE — 99213 OFFICE O/P EST LOW 20 MIN: CPT | Performed by: ANESTHESIOLOGY

## 2017-06-21 PROCEDURE — 62370 ANL SP INF PMP W/MDREPRG&FIL: CPT | Performed by: ANESTHESIOLOGY

## 2017-06-21 RX ORDER — HYDROCODONE BITARTRATE AND ACETAMINOPHEN 10; 325 MG/1; MG/1
TABLET ORAL
COMMUNITY
Start: 2017-06-19 | End: 2017-07-17

## 2017-06-21 NOTE — PATIENT INSTRUCTIONS
Refill policies:    • Allow 2-3 business days for refills; controlled substances may take longer.   • Contact your pharmacy at least 5 days prior to running out of medication and have them send an electronic request or submit request through the [facility] have a procedure or additional testing performed. [facility]) will contact your insurance carrier to obtain pre-certification or prior authorization.     Unfortunately, NATTY has seen an increase in denial of payment even though the p

## 2017-06-22 ENCOUNTER — OFFICE VISIT (OUTPATIENT)
Dept: SURGERY | Facility: CLINIC | Age: 52
End: 2017-06-22

## 2017-06-22 VITALS
BODY MASS INDEX: 18.4 KG/M2 | DIASTOLIC BLOOD PRESSURE: 60 MMHG | RESPIRATION RATE: 18 BRPM | SYSTOLIC BLOOD PRESSURE: 102 MMHG | HEIGHT: 62 IN | WEIGHT: 100 LBS | HEART RATE: 75 BPM

## 2017-06-22 DIAGNOSIS — Z98.890 S/P INSERTION OF INTRATHECAL PUMP: Primary | ICD-10-CM

## 2017-06-22 PROCEDURE — 99024 POSTOP FOLLOW-UP VISIT: CPT | Performed by: NEUROLOGICAL SURGERY

## 2017-06-22 NOTE — PATIENT INSTRUCTIONS
Refill policies:    • Allow 2-3 business days for refills; controlled substances may take longer.   • Contact your pharmacy at least 5 days prior to running out of medication and have them send an electronic request or submit request through the [facility] have a procedure or additional testing performed. [facility]) will contact your insurance carrier to obtain pre-certification or prior authorization.     Unfortunately, NATTY has seen an increase in denial of payment even though the p

## 2017-06-22 NOTE — PROGRESS NOTES
Neurosurgery Clinic Visit  2017    Fer Suma PCP:  Yumiko Betancourt MD    10/21/1965 MRN [id number]     HISTORY OF PRESENT ILLNESS:  Fer Moise is a(n) 46year old female here status post catheter revision for pain pump  Patient doing w

## 2017-06-24 NOTE — PROGRESS NOTES
Name: Lilli Joya   : 10/21/1965   DOS: 2017     Pain Clinic Follow Up Visit:   Lilli Joya is a 46year old female who presents for recheck of her chronic low back pain. She is status post intrathecal morphine pump revision.   Today she wa dosage was increased by 22%. The pump was reprogrammed after adjusting the dose. The patient will be followed in the pain clinic in 2 weeks for further pump adjustment. Orders:No orders of the defined types were placed in this encounter.       Jonah Lemon

## 2017-07-05 ENCOUNTER — OFFICE VISIT (OUTPATIENT)
Dept: SURGERY | Facility: CLINIC | Age: 52
End: 2017-07-05

## 2017-07-05 VITALS
RESPIRATION RATE: 16 BRPM | SYSTOLIC BLOOD PRESSURE: 112 MMHG | HEART RATE: 80 BPM | DIASTOLIC BLOOD PRESSURE: 60 MMHG | HEIGHT: 62 IN | WEIGHT: 100 LBS | BODY MASS INDEX: 18.4 KG/M2

## 2017-07-05 DIAGNOSIS — G89.29 OTHER CHRONIC PAIN: ICD-10-CM

## 2017-07-05 DIAGNOSIS — M96.1 FAILED BACK SURGICAL SYNDROME: Primary | ICD-10-CM

## 2017-07-05 DIAGNOSIS — Z97.8 PRESENCE OF INTRATHECAL PUMP: ICD-10-CM

## 2017-07-05 PROCEDURE — 99213 OFFICE O/P EST LOW 20 MIN: CPT | Performed by: PHYSICIAN ASSISTANT

## 2017-07-05 PROCEDURE — 62368 ANALYZE SP INF PUMP W/REPROG: CPT | Performed by: PHYSICIAN ASSISTANT

## 2017-07-05 NOTE — PATIENT INSTRUCTIONS
Refill policies:    • Allow 2-3 business days for refills; controlled substances may take longer.   • Contact your pharmacy at least 5 days prior to running out of medication and have them send an electronic request or submit request through the [facility] have a procedure or additional testing performed. [facility]) will contact your insurance carrier to obtain pre-certification or prior authorization.     Unfortunately, NATTY has seen an increase in denial of payment even though the p

## 2017-07-05 NOTE — PROGRESS NOTES
Name: Darryn Brooks   : 10/21/1965   DOS: 2017     Pain Clinic Follow Up Visit:   Patient presents with: Follow - Up: pump adj      Darryn Brooks is a 46year old female who presents for recheck of chronic low back pain. Pt is S/P ITP revision. mouth daily. Disp:  Rfl:    Multiple Vitamin (MULTI-VITAMINS) Oral Tab Take 1 tablet by mouth daily.    Disp:  Rfl:          EXAM:   /60   Pulse 80   Resp 16   Ht 62\"   Wt 100 lb   BMI 18.29 kg/m²   General:  Patient is a(n) 46year old year old fema

## 2017-07-11 RX ORDER — METHOCARBAMOL 750 MG/1
TABLET, FILM COATED ORAL
Qty: 180 TABLET | Refills: 0 | Status: SHIPPED | OUTPATIENT
Start: 2017-07-11 | End: 2017-08-11

## 2017-07-17 ENCOUNTER — OFFICE VISIT (OUTPATIENT)
Dept: NEUROLOGY | Facility: CLINIC | Age: 52
End: 2017-07-17

## 2017-07-17 ENCOUNTER — TELEPHONE (OUTPATIENT)
Dept: NEUROLOGY | Facility: CLINIC | Age: 52
End: 2017-07-17

## 2017-07-17 VITALS
DIASTOLIC BLOOD PRESSURE: 66 MMHG | SYSTOLIC BLOOD PRESSURE: 114 MMHG | WEIGHT: 100 LBS | HEART RATE: 106 BPM | HEIGHT: 62 IN | RESPIRATION RATE: 18 BRPM | OXYGEN SATURATION: 94 % | BODY MASS INDEX: 18.4 KG/M2

## 2017-07-17 DIAGNOSIS — M96.1 FAILED BACK SYNDROME OF LUMBAR SPINE: ICD-10-CM

## 2017-07-17 DIAGNOSIS — M96.1 LUMBAR POST-LAMINECTOMY SYNDROME: Primary | ICD-10-CM

## 2017-07-17 DIAGNOSIS — G89.4 CHRONIC PAIN SYNDROME: ICD-10-CM

## 2017-07-17 DIAGNOSIS — M54.16 LUMBAR RADICULOPATHY: ICD-10-CM

## 2017-07-17 DIAGNOSIS — M48.061 LUMBAR STENOSIS: ICD-10-CM

## 2017-07-17 DIAGNOSIS — M51.9 LUMBAR DISC DISEASE: ICD-10-CM

## 2017-07-17 DIAGNOSIS — M51.26 LUMBAR HERNIATED DISC: ICD-10-CM

## 2017-07-17 PROCEDURE — 99214 OFFICE O/P EST MOD 30 MIN: CPT | Performed by: PHYSICAL MEDICINE & REHABILITATION

## 2017-07-17 RX ORDER — LIDOCAINE 50 MG/G
1 OINTMENT TOPICAL 4 TIMES DAILY PRN
Qty: 35.44 G | Refills: 3 | Status: SHIPPED | OUTPATIENT
Start: 2017-07-17 | End: 2017-08-16

## 2017-07-17 RX ORDER — HYDROCODONE BITARTRATE AND ACETAMINOPHEN 10; 325 MG/1; MG/1
1 TABLET ORAL EVERY 6 HOURS PRN
Qty: 120 TABLET | Refills: 0 | Status: SHIPPED | OUTPATIENT
Start: 2017-09-17 | End: 2017-10-17

## 2017-07-17 RX ORDER — HYDROCODONE BITARTRATE AND ACETAMINOPHEN 10; 325 MG/1; MG/1
1 TABLET ORAL EVERY 6 HOURS PRN
Qty: 120 TABLET | Refills: 0 | Status: SHIPPED | OUTPATIENT
Start: 2017-08-18 | End: 2017-10-17

## 2017-07-17 RX ORDER — HYDROCODONE BITARTRATE AND ACETAMINOPHEN 10; 325 MG/1; MG/1
1 TABLET ORAL EVERY 6 HOURS PRN
Qty: 120 TABLET | Refills: 0 | Status: SHIPPED | OUTPATIENT
Start: 2017-07-19 | End: 2017-07-17

## 2017-07-17 NOTE — PATIENT INSTRUCTIONS
Refill policies:    • Allow 2 business days for refills; controlled substances may take longer.   • Contact your pharmacy at least 5 days prior to running out of medication and have them send an electronic request or submit request through the “request re your physician has recommended that you have a procedure or additional testing performed. [facility]) will contact your insurance carrier to obtain pre-certification or prior authorization.     Unfortunately, NATTY has seen an increas

## 2017-07-17 NOTE — ADDENDUM NOTE
Encounter addended by: Kip Andrew MD on: 7/17/2017  2:24 PM<BR>    Actions taken:  activity accessed

## 2017-07-17 NOTE — PROGRESS NOTES
Low Back Pain H & P    Chief Complaint: Patient presents with:  Low Back Pain: LOV 03/31/17. Pt had intrathecal pump revision after cath in back broke 06/09/17. Pt had good pain relief with pump but after revision medication is still being titrated.  Pain is Comment: anterior removal of cervical instrumentation;                C3-4 anterior cervical discectomy and fusion,                with structural allograft and plate                instrumentation  [id number]?: OTHER      Comment: 2 cervical neck surgeries Family h   • RA[other] [OTHER] Maternal Grandmother    • RA[other] [OTHER] Paternal Grandmother        Social History     Social History  Social History   Marital status: Single  Spouse name: N/A    Years of education: N/A  Number of children: N/A     Occ foot  lateral LEFT foot  first dorsal web space of the LEFT foot   LE Muscle Strength:  All LE strength measurements 5/5 except:  Hamstring RIGHT:   4+/5  Hamstring LEFT:   4-/5  Ankle Dorsiflexlon LEFT:   4-/5  Extensor Hallucis Longus LEFT:   4-/5   RIGHT

## 2017-07-19 NOTE — TELEPHONE ENCOUNTER
Lidocaine 5% external ointment is not covered under patients insurance. Spoke to patient who will get 4% ointment over the counter.   No further action required

## 2017-07-26 ENCOUNTER — TELEPHONE (OUTPATIENT)
Dept: SURGERY | Facility: CLINIC | Age: 52
End: 2017-07-26

## 2017-07-26 NOTE — TELEPHONE ENCOUNTER
Spoke w/ pt who states she is having slight swelling in her back. Pt states she believes it may be her \"catheter again. \"  Pt s/p ITP pump catheter revision 6/22/17 with Dr. Lety Rodriguez.   Informed ALEXEY Almaraz of pt status and pt to see Dr. Lorenzo Flores tomorrow i

## 2017-07-27 ENCOUNTER — HOSPITAL ENCOUNTER (OUTPATIENT)
Dept: GENERAL RADIOLOGY | Facility: HOSPITAL | Age: 52
Discharge: HOME | End: 2017-07-27
Attending: NEUROLOGICAL SURGERY
Payer: MEDICARE

## 2017-07-27 ENCOUNTER — TELEPHONE (OUTPATIENT)
Dept: SURGERY | Facility: CLINIC | Age: 52
End: 2017-07-27

## 2017-07-27 ENCOUNTER — OFFICE VISIT (OUTPATIENT)
Dept: SURGERY | Facility: CLINIC | Age: 52
End: 2017-07-27

## 2017-07-27 VITALS
HEIGHT: 62 IN | DIASTOLIC BLOOD PRESSURE: 62 MMHG | WEIGHT: 100 LBS | HEART RATE: 86 BPM | SYSTOLIC BLOOD PRESSURE: 126 MMHG | BODY MASS INDEX: 18.4 KG/M2

## 2017-07-27 DIAGNOSIS — G89.4 CHRONIC PAIN SYNDROME: ICD-10-CM

## 2017-07-27 DIAGNOSIS — T85.610A MALFUNCTION OF INTRATHECAL INFUSION PUMP, INITIAL ENCOUNTER: ICD-10-CM

## 2017-07-27 DIAGNOSIS — M96.1 FAILED BACK SYNDROME OF LUMBAR SPINE: ICD-10-CM

## 2017-07-27 DIAGNOSIS — G89.4 CHRONIC PAIN SYNDROME: Primary | ICD-10-CM

## 2017-07-27 PROCEDURE — 99024 POSTOP FOLLOW-UP VISIT: CPT | Performed by: NEUROLOGICAL SURGERY

## 2017-07-27 PROCEDURE — 72100 X-RAY EXAM L-S SPINE 2/3 VWS: CPT | Performed by: NEUROLOGICAL SURGERY

## 2017-07-27 PROCEDURE — 72070 X-RAY EXAM THORAC SPINE 2VWS: CPT | Performed by: NEUROLOGICAL SURGERY

## 2017-07-27 PROCEDURE — 74000 XR ABDOMEN (1 VIEW) (CPT=74000): CPT | Performed by: NEUROLOGICAL SURGERY

## 2017-07-27 NOTE — PROGRESS NOTES
Feels a bump at pain pump site. Patient states her pain is currently at a 4 and located in lower back radiating down posterior left leg.

## 2017-07-27 NOTE — TELEPHONE ENCOUNTER
LMTCB. Pt to be scheduled for dye study Monday. Case request placed by CECILE Garcia under Dr. Joe Chung today. Providers aware of pt allergy and pt to receive medication pre-op.

## 2017-07-27 NOTE — PROGRESS NOTES
Neurosurgery Clinic Visit  2017    Jg Quevedo PCP:  Yadiel Mensah MD    10/21/1965 MRN [id number]     HISTORY OF PRESENT ILLNESS:  Jg Quevedo is a(n) 46year old female who presents with a small fluid collection on her back  Patient h

## 2017-07-28 NOTE — TELEPHONE ENCOUNTER
Spoke with patient and scheduled procedure. Reviewed pre-op instructions. Confirmed procedure time. Patient verbalized understanding, no further questions at this time.

## 2017-07-31 ENCOUNTER — HOSPITAL ENCOUNTER (OUTPATIENT)
Facility: HOSPITAL | Age: 52
Setting detail: HOSPITAL OUTPATIENT SURGERY
Discharge: HOME | End: 2017-07-31
Attending: ANESTHESIOLOGY | Admitting: ANESTHESIOLOGY
Payer: MEDICARE

## 2017-07-31 ENCOUNTER — SURGERY (OUTPATIENT)
Age: 52
End: 2017-07-31

## 2017-07-31 ENCOUNTER — APPOINTMENT (OUTPATIENT)
Dept: GENERAL RADIOLOGY | Facility: HOSPITAL | Age: 52
End: 2017-07-31
Attending: ANESTHESIOLOGY
Payer: MEDICARE

## 2017-07-31 VITALS
HEART RATE: 87 BPM | DIASTOLIC BLOOD PRESSURE: 86 MMHG | SYSTOLIC BLOOD PRESSURE: 128 MMHG | RESPIRATION RATE: 20 BRPM | TEMPERATURE: 98 F | OXYGEN SATURATION: 96 %

## 2017-07-31 DIAGNOSIS — T85.610A MALFUNCTION OF INTRATHECAL INFUSION PUMP, INITIAL ENCOUNTER: ICD-10-CM

## 2017-07-31 DIAGNOSIS — M96.1 FAILED BACK SYNDROME OF LUMBAR SPINE: ICD-10-CM

## 2017-07-31 PROCEDURE — 77002 NEEDLE LOCALIZATION BY XRAY: CPT | Performed by: ANESTHESIOLOGY

## 2017-07-31 PROCEDURE — 4B01XVZ MEASUREMENT OF PERIPHERAL NERVOUS STIMULATOR, EXTERNAL APPROACH: ICD-10-PCS | Performed by: ANESTHESIOLOGY

## 2017-07-31 PROCEDURE — 77003 FLUOROGUIDE FOR SPINE INJECT: CPT | Performed by: ANESTHESIOLOGY

## 2017-07-31 PROCEDURE — B01B1ZZ FLUOROSCOPY OF SPINAL CORD USING LOW OSMOLAR CONTRAST: ICD-10-PCS | Performed by: ANESTHESIOLOGY

## 2017-07-31 PROCEDURE — 75898 FOLLOW-UP ANGIOGRAPHY: CPT

## 2017-07-31 PROCEDURE — 3E0R3KZ INTRODUCTION OF OTHER DIAGNOSTIC SUBSTANCE INTO SPINAL CANAL, PERCUTANEOUS APPROACH: ICD-10-PCS | Performed by: ANESTHESIOLOGY

## 2017-07-31 RX ORDER — LIDOCAINE HYDROCHLORIDE 10 MG/ML
INJECTION, SOLUTION INFILTRATION; PERINEURAL AS NEEDED
Status: DISCONTINUED | OUTPATIENT
Start: 2017-07-31 | End: 2017-07-31

## 2017-07-31 NOTE — H&P
History & Physical Examination    Patient Name: Geovani Thomas  MRN: [id number]  CSN: [id number]  YOB: 1965    Pre-Operative Diagnosis:  Failed back syndrome of lumbar spine [M96.1]  Malfunction of intrathecal infusion pump, initial encounte • Anxiety state, unspecified    • Arthritis    • Back problem    • Borderline diabetic    • Chronic pain    • Depression    • Failed back syndrome     per Next[employer]:  \"degenerative disc disease and failed back syndrome\"   • Fibromyalgia    • Herniated di NextGen:  \"left shoulder surgery x's 2\"  2010: OTHER SURGICAL HISTORY      Comment: per NextGen:  \"ACDF\":  2012: OTHER SURGICAL HISTORY      Comment: per NextGen:  \"removal of pain med pump\"  No date: OTHER SURGICAL HISTORY      Comment: per NextGen:

## 2017-07-31 NOTE — OPERATIVE REPORT
[facility]  Operative Report  2017     Karrie Bingham Patient Status:  Hospital Outpatient Surgery    10/21/1965 MRN [id number]   Location [address] [place] [address] Attending Padmini Gamez MD   Hosp Day # 0 GLYNN Ruiz with 2 mL of preservative-free normal saline. Needle was taken out. The patient tolerated the procedure very well. The pump was reprogrammed and bridge bolus was given.  The patient had complete understanding of the risks and benefits of the procedure

## 2017-08-01 ENCOUNTER — TELEPHONE (OUTPATIENT)
Dept: SURGERY | Facility: CLINIC | Age: 52
End: 2017-08-01

## 2017-08-01 ENCOUNTER — TELEPHONE (OUTPATIENT)
Dept: NEUROLOGY | Facility: CLINIC | Age: 52
End: 2017-08-01

## 2017-08-01 NOTE — TELEPHONE ENCOUNTER
Spoke w/ pt regarding recommended procedure. Pt states she has concerns regarding recommended procedure. Sent pt AVS blood patch to [facility]. Pt verbalized appreciation.   Pt states she is concerned with possible infection and requesting call back from pr

## 2017-08-01 NOTE — TELEPHONE ENCOUNTER
Per Dr. Manas Frias, he spoke with Dr. Nusrat Lyle. Dr. Nusrat Lyle is going to do blood patch and aspiration. Will have the Pain service contact patient to get scheduled.

## 2017-08-03 NOTE — TELEPHONE ENCOUNTER
Spoke w/ pt who states lump has been reduced to size of golf ball. Pt still has questions for provider and asking for call back.  Informed pt provider not in office today, but will be tomorrow for clinic and that provider will be updated on pt request.  Pt

## 2017-08-03 NOTE — TELEPHONE ENCOUNTER
Dr. Emerita Genao spoke with Uriel Mena and patient states the lump is getting smaller and per Dr. Emerita Genao if it is getting smaller we do not need to do any procedure at this time.

## 2017-08-04 NOTE — TELEPHONE ENCOUNTER
Dr. Jordin Hayes spoke with the patient and advised her that if the lump continues to get smaller over the course of next week we don't need to do anything, But if the lump gets bigger or does not resolve then she will need to come in for a blood patch.

## 2017-08-11 RX ORDER — METHOCARBAMOL 750 MG/1
TABLET, FILM COATED ORAL
Qty: 180 TABLET | Refills: 0 | Status: SHIPPED | OUTPATIENT
Start: 2017-08-11 | End: 2017-09-12

## 2017-08-11 NOTE — TELEPHONE ENCOUNTER
Refill request for methocarbamol 750 mg, take 1 tab every 8 hrs as needed, #180, no refills    LOV: 7/17/17  NOV: 10/17/17

## 2017-08-27 NOTE — MR AVS SNAPSHOT
[address], [address] [place] [address] [id number]               Thank you for choosing us for your health care visit with Divine Stoll MD.  We are glad to serve you and happy to provide you with th ? If your prescription is due for a refill, you may be due for a follow up appointment. ? To best provide you care, patients receiving routine medications need to be seen at least once a year.      protocol for controlled substances:  Written prescr You are scheduled for a Intrathecal pump implant on 6/9/17 with     · Contact the pre-admissions department at [phone] to speak with a nurse  · You will need to have some blood work done for surgery  · Nothing to eat or drink midnight the n Commonly known as:  Soren Reyes                   MyChart     Visit [employer]  You can access your MyChart to more actively manage your health care and view more details from this visit by going to [email or web address].   If you've recently had a stay at detailed exam EOMI/PERRL

## 2017-08-31 ENCOUNTER — TELEPHONE (OUTPATIENT)
Dept: NEUROLOGY | Facility: CLINIC | Age: 52
End: 2017-08-31

## 2017-08-31 DIAGNOSIS — M96.1 FAILED BACK SYNDROME OF LUMBAR SPINE: ICD-10-CM

## 2017-08-31 NOTE — TELEPHONE ENCOUNTER
ITP REFILL due by 10/12/17. Please place order for ITP Rx. Last medication ordered:  morphINE Sulfate (PF) 400 mg in Sodium Chloride 40 mL IT infusion    Pt scheduled for OV  10/11 @ 11AM.    Pt declined review of instructions.   Copy sent to pt via [employer] [facility] [employer]

## 2017-09-12 RX ORDER — METHOCARBAMOL 750 MG/1
TABLET, FILM COATED ORAL
Qty: 180 TABLET | Refills: 0 | Status: SHIPPED | OUTPATIENT
Start: 2017-09-12 | End: 2017-10-11

## 2017-09-12 NOTE — TELEPHONE ENCOUNTER
Refill request for methocarbamol 750 mg, take 2 tabs every 8 hrs as needed, #180, no refills    LOV: 7/17/17  NOV: 10/17/17

## 2017-10-11 ENCOUNTER — OFFICE VISIT (OUTPATIENT)
Dept: SURGERY | Facility: CLINIC | Age: 52
End: 2017-10-11

## 2017-10-11 VITALS
HEIGHT: 62 IN | HEART RATE: 70 BPM | SYSTOLIC BLOOD PRESSURE: 112 MMHG | DIASTOLIC BLOOD PRESSURE: 72 MMHG | WEIGHT: 100 LBS | BODY MASS INDEX: 18.4 KG/M2

## 2017-10-11 DIAGNOSIS — Z97.8 PRESENCE OF INTRATHECAL PUMP: Primary | ICD-10-CM

## 2017-10-11 DIAGNOSIS — M96.1 FAILED BACK SURGICAL SYNDROME: ICD-10-CM

## 2017-10-11 RX ORDER — LIDOCAINE HYDROCHLORIDE 10 MG/ML
3 INJECTION, SOLUTION INFILTRATION; PERINEURAL ONCE
Status: DISCONTINUED | OUTPATIENT
Start: 2017-10-11 | End: 2017-10-11

## 2017-10-11 RX ORDER — METHOCARBAMOL 750 MG/1
TABLET, FILM COATED ORAL
Qty: 180 TABLET | Refills: 0 | Status: SHIPPED | OUTPATIENT
Start: 2017-10-11 | End: 2017-11-10

## 2017-10-11 NOTE — PROGRESS NOTES
Intrathecal infusion pump refill today had to be rescheduled due to incorrect medication strength sent by pharmacy. No charge for today's visit.

## 2017-10-11 NOTE — PATIENT INSTRUCTIONS
Refill policies:    • Allow 2-3 business days for refills; controlled substances may take longer.   • Contact your pharmacy at least 5 days prior to running out of medication and have them send an electronic request or submit request through the [facility] have a procedure or additional testing performed. [facility]) will contact your insurance carrier to obtain pre-certification or prior authorization.     Unfortunately, NATTY has seen an increase in denial of payment even though the p

## 2017-10-11 NOTE — TELEPHONE ENCOUNTER
ITP PUMP Medication received. Verified by Cornelius Mendiola and Temi Valadez. Dose correct and medication stored in lock box.     Rx #: [id number]  LOT #: [email or web address]  Expiration date:   11/8/17

## 2017-10-11 NOTE — TELEPHONE ENCOUNTER
Refill request for methocarbamol 750 mg, take 2 tabs every 8 hrs as needed, #180, no refills    LOV: 7/17/17  NOV: 10/17/17  Last refilled on 9/12/17

## 2017-10-11 NOTE — PROGRESS NOTES
49-year-old female patient is here today for intrathecal morphine infusion pump refill. She has no concerns regarding the functioning of her pump today. This currently filled with Infumorph 10 mg/mL and a continuous daily dose of 2.399 mg per day.   Gris Lombardi procedure/test has been approved and is a COVERED benefit. Although the [facility] staff does its due diligence, the insurance carrier gives the disclaimer that \"Although the procedure is authorized, this does not guarantee payment. \"    Ultimately the patient

## 2017-10-13 ENCOUNTER — OFFICE VISIT (OUTPATIENT)
Dept: SURGERY | Facility: CLINIC | Age: 52
End: 2017-10-13

## 2017-10-13 VITALS
BODY MASS INDEX: 18.4 KG/M2 | HEART RATE: 80 BPM | SYSTOLIC BLOOD PRESSURE: 112 MMHG | HEIGHT: 62 IN | WEIGHT: 100 LBS | DIASTOLIC BLOOD PRESSURE: 68 MMHG

## 2017-10-13 DIAGNOSIS — Z97.8 PRESENCE OF INTRATHECAL PUMP: Primary | ICD-10-CM

## 2017-10-13 PROCEDURE — 62370 ANL SP INF PMP W/MDREPRG&FIL: CPT | Performed by: ANESTHESIOLOGY

## 2017-10-13 RX ORDER — LIDOCAINE HYDROCHLORIDE 10 MG/ML
3 INJECTION, SOLUTION INFILTRATION; PERINEURAL ONCE
Status: COMPLETED | OUTPATIENT
Start: 2017-10-13 | End: 2017-10-13

## 2017-10-13 NOTE — PATIENT INSTRUCTIONS
Refill policies:    • Allow 2-3 business days for refills; controlled substances may take longer.   • Contact your pharmacy at least 5 days prior to running out of medication and have them send an electronic request or submit request through the [facility] have a procedure or additional testing performed. [facility]) will contact your insurance carrier to obtain pre-certification or prior authorization.     Unfortunately, NATTY has seen an increase in denial of payment even though the p

## 2017-10-13 NOTE — PROGRESS NOTES
41-year-old female patient is here today for intrathecal morphine infusion pump refill. She has no concerns regarding the functioning of her pump today. Pump currently is filled with Infumorph 10 mg/mL and a continuous daily dose of 2.399 mg per day. patient notified that they should contact their insurer to verify that your procedure/test has been approved and is a COVERED benefit.   Although the [facility] staff does its due diligence, the insurance carrier gives the disclaimer that \"Although the procedure

## 2017-10-13 NOTE — TELEPHONE ENCOUNTER
ITP PUMP Medication received. Verified by Janell Mata RN. Dose correct and medication stored in lock box.     Rx #: [id number]  LOT #:  [email or web address]  Expiration date:  11/11/2017

## 2017-10-15 NOTE — PROCEDURES
[facility]  Operative Report  10/13/2017     Davbailey Clonts Patient Status:  No patient class for patient encounter    10/21/1965 MRN [id number]   Location [address] [place] [address], [address] [place] [address], [address] [place] [address] Attending No att. providers found   Hosp D

## 2017-10-17 ENCOUNTER — OFFICE VISIT (OUTPATIENT)
Dept: NEUROLOGY | Facility: CLINIC | Age: 52
End: 2017-10-17

## 2017-10-17 VITALS
WEIGHT: 100 LBS | BODY MASS INDEX: 18 KG/M2 | RESPIRATION RATE: 13 BRPM | DIASTOLIC BLOOD PRESSURE: 64 MMHG | SYSTOLIC BLOOD PRESSURE: 108 MMHG | HEART RATE: 98 BPM

## 2017-10-17 DIAGNOSIS — M54.42 CHRONIC LEFT-SIDED LOW BACK PAIN WITH LEFT-SIDED SCIATICA: ICD-10-CM

## 2017-10-17 DIAGNOSIS — G89.29 CHRONIC LEFT-SIDED LOW BACK PAIN WITH LEFT-SIDED SCIATICA: ICD-10-CM

## 2017-10-17 DIAGNOSIS — M51.9 LUMBAR DISC DISEASE: ICD-10-CM

## 2017-10-17 DIAGNOSIS — M96.1 LUMBAR POST-LAMINECTOMY SYNDROME: Primary | ICD-10-CM

## 2017-10-17 DIAGNOSIS — M51.26 LUMBAR HERNIATED DISC: ICD-10-CM

## 2017-10-17 DIAGNOSIS — M48.061 LUMBAR FORAMINAL STENOSIS: ICD-10-CM

## 2017-10-17 DIAGNOSIS — G89.4 CHRONIC PAIN SYNDROME: ICD-10-CM

## 2017-10-17 DIAGNOSIS — M54.16 LUMBAR RADICULOPATHY: ICD-10-CM

## 2017-10-17 PROCEDURE — 99214 OFFICE O/P EST MOD 30 MIN: CPT | Performed by: PHYSICAL MEDICINE & REHABILITATION

## 2017-10-17 RX ORDER — HYDROCODONE BITARTRATE AND ACETAMINOPHEN 10; 325 MG/1; MG/1
1 TABLET ORAL EVERY 6 HOURS PRN
Qty: 120 TABLET | Refills: 0 | Status: SHIPPED | OUTPATIENT
Start: 2017-12-17 | End: 2018-01-03

## 2017-10-17 RX ORDER — HYDROCODONE BITARTRATE AND ACETAMINOPHEN 10; 325 MG/1; MG/1
1 TABLET ORAL EVERY 6 HOURS PRN
Qty: 120 TABLET | Refills: 0 | Status: SHIPPED | OUTPATIENT
Start: 2017-11-17 | End: 2018-01-23

## 2017-10-17 RX ORDER — HYDROCODONE BITARTRATE AND ACETAMINOPHEN 10; 325 MG/1; MG/1
1 TABLET ORAL EVERY 6 HOURS PRN
Qty: 120 TABLET | Refills: 0 | Status: SHIPPED | OUTPATIENT
Start: 2017-10-18 | End: 2018-01-23

## 2017-10-17 NOTE — PATIENT INSTRUCTIONS
As of October 6th 2014, the Drug Enforcement Agency [facility]) is reclassifying all hydrocodone combination medications from Schedule III to Schedule II. This includes medications such as Norco, Vicodin, Lortab, Zohydro, and Vicoprofen.     What this means for y chart.      Plan  The patient will continue with their current pain medications. She will continue seeing Dr. Wiley Clifton for the pump management. The patient does not need any injections at this time. She will continue with her current activities.     Sh

## 2017-10-17 NOTE — PROGRESS NOTES
Low Back Pain H & P    Chief Complaint: Patient presents with:  Low Back Pain: pt here for follow up with c/o lower back pain radiating down left leg with numbness in the left foot that is worse in morning.  pt notes pain management is improving, there was History   Past Surgical History:  No date: Naheed Taylor  No date: APPENDECTOMY  No date: BACK SURGERY      Comment: multiple  No date: CARPAL TUNNEL RELEASE      Comment: bilateral  No date: HAND/FINGER SURGERY UNLISTED      Comment: left hand History   Problem Relation Age of Onset   • Cancer Mother    • Diabetes Mother    • Arthritis Mother    • Other[other] Dirk Sheldon Mother      shi's syndrom/CREST syndrome   • Alcohol and Other Disorders Associated Brother    • gout[other] Dirk Sheldon Father Processes: Tender at L3   Z-joints: Non-tender for all Z-joints, but tender over the nodule to the left of the L4-5 z-joint in the area of the anchor that was placed   SIJ: Non-tender for bilateral SIJ   Piriformis Muscle: Tender at  left Piriformis muscle

## 2017-11-10 NOTE — TELEPHONE ENCOUNTER
Refill request for Methocarbamol 750 mg take 2 tabs q8hr prn, qt:180 0 refills    LOV: 10/17/17  NOV: 1/23/18  Last refill: 10/11/17 qt:180

## 2017-11-13 RX ORDER — METHOCARBAMOL 750 MG/1
TABLET, FILM COATED ORAL
Qty: 180 TABLET | Refills: 0 | Status: SHIPPED | OUTPATIENT
Start: 2017-11-13 | End: 2017-12-11

## 2017-12-11 NOTE — TELEPHONE ENCOUNTER
Refill request for methocarbamol 750 mg, take 2 tabs every 8 hrs as needed, #180, no refills    LOV: 10/17/17  NOV: 1/23/18

## 2017-12-12 RX ORDER — METHOCARBAMOL 750 MG/1
TABLET, FILM COATED ORAL
Qty: 180 TABLET | Refills: 0 | Status: SHIPPED | OUTPATIENT
Start: 2017-12-12 | End: 2018-01-09

## 2017-12-14 ENCOUNTER — TELEPHONE (OUTPATIENT)
Dept: SURGERY | Facility: CLINIC | Age: 52
End: 2017-12-14

## 2017-12-14 DIAGNOSIS — M96.1 FAILED BACK SYNDROME OF LUMBAR SPINE: ICD-10-CM

## 2017-12-14 NOTE — TELEPHONE ENCOUNTER
ITP REFILL due by 2/2/18. Please place order for ITP Rx.   Last medication ordered:  morphINE Sulfate (PF) 400 mg in Sodium Chloride 40 mL IT infusion    OV available 1/31/18 @ 8:30am or 1PM.

## 2018-01-02 ENCOUNTER — TELEPHONE (OUTPATIENT)
Dept: NEUROLOGY | Facility: CLINIC | Age: 53
End: 2018-01-02

## 2018-01-03 NOTE — TELEPHONE ENCOUNTER
Per NATTY refill policy, only one month of refills on controlled substances unless otherwise specified by prescribing physician    Medication request: Norco 10-325mg q6h prn pain    LOV 10/17/17  NOV 1/23/18    [place] 12/18/17

## 2018-01-04 RX ORDER — HYDROCODONE BITARTRATE AND ACETAMINOPHEN 10; 325 MG/1; MG/1
1 TABLET ORAL EVERY 6 HOURS PRN
Qty: 120 TABLET | Refills: 0 | Status: SHIPPED | OUTPATIENT
Start: 2018-01-17 | End: 2018-01-23

## 2018-01-09 RX ORDER — METHOCARBAMOL 750 MG/1
TABLET, FILM COATED ORAL
Qty: 180 TABLET | Refills: 0 | Status: SHIPPED | OUTPATIENT
Start: 2018-01-09 | End: 2018-02-09

## 2018-01-09 NOTE — TELEPHONE ENCOUNTER
Refill request for methocarbamol 750 mg, take 1 tab every 8 hrs as needed, #180, no refills    LOV: 10/17/17  NOV: 1/23/18  Last refilled on 12/12/17

## 2018-01-23 ENCOUNTER — TELEPHONE (OUTPATIENT)
Dept: NEUROLOGY | Facility: CLINIC | Age: 53
End: 2018-01-23

## 2018-01-23 ENCOUNTER — OFFICE VISIT (OUTPATIENT)
Dept: NEUROLOGY | Facility: CLINIC | Age: 53
End: 2018-01-23

## 2018-01-23 VITALS — HEART RATE: 92 BPM | RESPIRATION RATE: 13 BRPM | SYSTOLIC BLOOD PRESSURE: 100 MMHG | DIASTOLIC BLOOD PRESSURE: 62 MMHG

## 2018-01-23 DIAGNOSIS — M48.061 LUMBAR FORAMINAL STENOSIS: ICD-10-CM

## 2018-01-23 DIAGNOSIS — G89.29 CHRONIC LEFT-SIDED LOW BACK PAIN WITH LEFT-SIDED SCIATICA: ICD-10-CM

## 2018-01-23 DIAGNOSIS — M96.1 LUMBAR POST-LAMINECTOMY SYNDROME: ICD-10-CM

## 2018-01-23 DIAGNOSIS — M51.9 LUMBAR DISC DISEASE: ICD-10-CM

## 2018-01-23 DIAGNOSIS — M70.62 GREATER TROCHANTERIC BURSITIS OF LEFT HIP: Primary | ICD-10-CM

## 2018-01-23 DIAGNOSIS — G89.4 CHRONIC PAIN SYNDROME: ICD-10-CM

## 2018-01-23 DIAGNOSIS — M54.42 CHRONIC LEFT-SIDED LOW BACK PAIN WITH LEFT-SIDED SCIATICA: ICD-10-CM

## 2018-01-23 DIAGNOSIS — M51.26 LUMBAR HERNIATED DISC: ICD-10-CM

## 2018-01-23 DIAGNOSIS — M54.16 LUMBAR RADICULOPATHY: ICD-10-CM

## 2018-01-23 PROCEDURE — 20611 DRAIN/INJ JOINT/BURSA W/US: CPT | Performed by: PHYSICAL MEDICINE & REHABILITATION

## 2018-01-23 PROCEDURE — 99214 OFFICE O/P EST MOD 30 MIN: CPT | Performed by: PHYSICAL MEDICINE & REHABILITATION

## 2018-01-23 RX ORDER — HYDROCODONE BITARTRATE AND ACETAMINOPHEN 10; 325 MG/1; MG/1
1 TABLET ORAL EVERY 6 HOURS PRN
Qty: 120 TABLET | Refills: 0 | Status: SHIPPED | OUTPATIENT
Start: 2018-02-16 | End: 2018-04-26

## 2018-01-23 RX ORDER — LIDOCAINE HYDROCHLORIDE 10 MG/ML
2.5 INJECTION, SOLUTION INFILTRATION; PERINEURAL ONCE
Status: COMPLETED | OUTPATIENT
Start: 2018-01-23 | End: 2018-01-23

## 2018-01-23 RX ORDER — HYDROCODONE BITARTRATE AND ACETAMINOPHEN 10; 325 MG/1; MG/1
1 TABLET ORAL EVERY 6 HOURS PRN
Qty: 120 TABLET | Refills: 0 | Status: SHIPPED | OUTPATIENT
Start: 2018-03-18 | End: 2018-04-26

## 2018-01-23 RX ORDER — TRIAMCINOLONE ACETONIDE 40 MG/ML
60 INJECTION, SUSPENSION INTRA-ARTICULAR; INTRAMUSCULAR ONCE
Status: COMPLETED | OUTPATIENT
Start: 2018-01-23 | End: 2018-01-23

## 2018-01-23 RX ORDER — HYDROCODONE BITARTRATE AND ACETAMINOPHEN 10; 325 MG/1; MG/1
1 TABLET ORAL EVERY 6 HOURS PRN
Qty: 120 TABLET | Refills: 0 | Status: SHIPPED | OUTPATIENT
Start: 2018-04-17 | End: 2018-04-26

## 2018-01-23 NOTE — TELEPHONE ENCOUNTER
Medicare Online for insurance coverage of left greater trochanteric bursal injection cpt codes 30865, [id number], . Insurance was verified and procedure is a covered benefit and does not require authorization.   Will inform Nursing

## 2018-01-23 NOTE — PATIENT INSTRUCTIONS
As of October 6th 2014, the Drug Enforcement Agency [facility]) is reclassifying all hydrocodone combination medications from Schedule III to Schedule II. This includes medications such as Norco, Vicodin, Lortab, Zohydro, and Vicoprofen.     What this means for y

## 2018-01-23 NOTE — PROGRESS NOTES
Low Back Pain H & P    Chief Complaint: Patient presents with:  Hip Pain: pt here for follow up with c/o severe left hip pain causing difficulty walking and patient would like injection if possible.  pt also has low back pain that manageable but flares up i instrumentation  [id number]?: OTHER      Comment: 2 cervical neck surgeries  10/26/16: OTHER      Comment: intrathecal morphine pump place per Dr. Cindy Causey  No date: OTHER SURGICAL HISTORY      Comment: per NextGen:  \" status: Single  Spouse name: N/A    Years of education: N/A  Number of children: N/A     Occupational History  None on file     Social History Main Topics   Smoking status: Former Smoker  0.50 Packs/day  For 10.00 Years     Quit date: 1/1/2012    Smokeless Lumbar Spine:  Sitting straight leg raise-RIGHT Negative for pain   Sitting straight leg raise-LEFT Negative for pain     Assessment  1. Greater trochanteric bursitis of left hip    2. Chronic pain syndrome    3. s/p left L3-5 laminectomies    4.  Chronic l

## 2018-01-24 ENCOUNTER — MED REC SCAN ONLY (OUTPATIENT)
Dept: NEUROLOGY | Facility: CLINIC | Age: 53
End: 2018-01-24

## 2018-01-26 NOTE — TELEPHONE ENCOUNTER
ITP PUMP Medication received. Verified by Jahaira Foster  and CECILE Bergeron. Dose correct and medication stored in lock box.     Rx #: [id number]  LOT #: [email or web address]  Expiration date:   2/24/18

## 2018-01-31 ENCOUNTER — OFFICE VISIT (OUTPATIENT)
Dept: SURGERY | Facility: CLINIC | Age: 53
End: 2018-01-31

## 2018-01-31 VITALS
HEART RATE: 88 BPM | HEIGHT: 62 IN | WEIGHT: 100 LBS | SYSTOLIC BLOOD PRESSURE: 118 MMHG | BODY MASS INDEX: 18.4 KG/M2 | DIASTOLIC BLOOD PRESSURE: 62 MMHG

## 2018-01-31 DIAGNOSIS — Z97.8 PRESENCE OF INTRATHECAL PUMP: Primary | ICD-10-CM

## 2018-01-31 PROCEDURE — 99212 OFFICE O/P EST SF 10 MIN: CPT | Performed by: PHYSICIAN ASSISTANT

## 2018-01-31 PROCEDURE — 62368 ANALYZE SP INF PUMP W/REPROG: CPT | Performed by: PHYSICIAN ASSISTANT

## 2018-01-31 NOTE — PROGRESS NOTES
Name: Dior Finch   : 10/21/1965   DOS: 2018     Pain Clinic Follow Up Visit:   Patient presents with:  Procedure: itp pump refill      Dior Finch is a 45year old female who presents for recheck of chronic pain syndrome here for ITP refil Triamterene-HCTZ 37.5-25 MG Oral Cap Take 1 capsule by mouth daily. Disp:  Rfl: 5   estradiol (ESTRACE) 1 MG Oral Tab Take 1 tablet by mouth daily. Disp:  Rfl:    Multiple Vitamin (MULTI-VITAMINS) Oral Tab Take 1 tablet by mouth daily.    Disp:  Rfl:

## 2018-01-31 NOTE — PATIENT INSTRUCTIONS
Refill policies:    • Allow 2-3 business days for refills; controlled substances may take longer.   • Contact your pharmacy at least 5 days prior to running out of medication and have them send an electronic request or submit request through the [facility] recommended that you have a procedure or additional testing performed. [facility]) will contact your insurance carrier to obtain pre-certification or prior authorization.     Unfortunately, [facility] has seen an increase in denial of paym

## 2018-02-05 ENCOUNTER — OFFICE VISIT (OUTPATIENT)
Dept: SURGERY | Facility: CLINIC | Age: 53
End: 2018-02-05

## 2018-02-05 VITALS
BODY MASS INDEX: 18.4 KG/M2 | HEART RATE: 72 BPM | DIASTOLIC BLOOD PRESSURE: 70 MMHG | WEIGHT: 100 LBS | SYSTOLIC BLOOD PRESSURE: 118 MMHG | HEIGHT: 62 IN

## 2018-02-05 DIAGNOSIS — M96.1 FAILED BACK SURGICAL SYNDROME: ICD-10-CM

## 2018-02-05 DIAGNOSIS — Z97.8 PRESENCE OF INTRATHECAL PUMP: Primary | ICD-10-CM

## 2018-02-05 PROCEDURE — 62370 ANL SP INF PMP W/MDREPRG&FIL: CPT | Performed by: ANESTHESIOLOGY

## 2018-02-05 RX ORDER — LIDOCAINE HYDROCHLORIDE 10 MG/ML
2 INJECTION, SOLUTION INFILTRATION; PERINEURAL ONCE
Status: COMPLETED | OUTPATIENT
Start: 2018-02-05 | End: 2018-02-05

## 2018-02-05 RX ADMIN — LIDOCAINE HYDROCHLORIDE 2 ML: 10 INJECTION, SOLUTION INFILTRATION; PERINEURAL at 12:10:00

## 2018-02-05 NOTE — PROCEDURES
[facility]  Operative Report  2018     Elizabeth Blevins Patient Status:  No patient class for patient encounter    10/21/1965 MRN [id number]   Location [address] [place] [address], [address] [place] [address], [address] [place] [address] Attending No att. providers found   [facility] Day

## 2018-02-05 NOTE — PROGRESS NOTES
58-year-old female patient is here for intrathecal infusion pump refill today. She is not experiencing any concerns with regard to her pump today. Patient has signed consent for the procedure today.  Pump contains Infumorph 10mg/ml with continuous daily d

## 2018-02-12 RX ORDER — METHOCARBAMOL 750 MG/1
TABLET, FILM COATED ORAL
Qty: 180 TABLET | Refills: 0 | Status: SHIPPED | OUTPATIENT
Start: 2018-02-12 | End: 2018-03-11

## 2018-02-12 NOTE — TELEPHONE ENCOUNTER
Refill request for methocarbamol 750 mg, take 2 tabs every 8 hrs as needed, #180, no refills    LOV: 1/23/18  NOV: 4/26/18  Last refilled on 1/9/18

## 2018-03-12 RX ORDER — METHOCARBAMOL 750 MG/1
TABLET, FILM COATED ORAL
Qty: 180 TABLET | Refills: 0 | Status: SHIPPED | OUTPATIENT
Start: 2018-03-12 | End: 2018-04-09

## 2018-03-12 NOTE — TELEPHONE ENCOUNTER
Refill request for methocarbamol 750 mg, take 2 tabs every 8 hrs as needed, #180, no refills    LOV: 1/23/18  NOV: 4/26/18  Last refilled on 2/12/18

## 2018-04-02 ENCOUNTER — TELEPHONE (OUTPATIENT)
Dept: SURGERY | Facility: CLINIC | Age: 53
End: 2018-04-02

## 2018-04-02 DIAGNOSIS — M96.1 FAILED BACK SYNDROME OF LUMBAR SPINE: ICD-10-CM

## 2018-04-03 NOTE — TELEPHONE ENCOUNTER
ITP REFILL due by 5-11-18. Please place order for ITP Rx. Last medication ordered:    morphINE Sulfate (PF) 400 mg in Sodium Chloride 40 mL IT infusion 1 Syringe 0 12/14/2017    Sig :  40 mL by Intrathecal route continuous.        OV scheduled 5-9-18 @ 1:

## 2018-04-09 RX ORDER — METHOCARBAMOL 750 MG/1
TABLET, FILM COATED ORAL
Qty: 180 TABLET | Refills: 0 | Status: SHIPPED | OUTPATIENT
Start: 2018-04-09 | End: 2018-05-07

## 2018-04-09 NOTE — TELEPHONE ENCOUNTER
Refill request for methocarbamol 750 mg, take 2 tabs every 8 hrs as needed, #180, no refills    LOV: 1/23/18  NOV: 4/26/18  Last refilled on 3/12/18

## 2018-04-18 ENCOUNTER — TELEPHONE (OUTPATIENT)
Dept: SURGERY | Facility: CLINIC | Age: 53
End: 2018-04-18

## 2018-04-18 ENCOUNTER — OFFICE VISIT (OUTPATIENT)
Dept: SURGERY | Facility: CLINIC | Age: 53
End: 2018-04-18

## 2018-04-18 VITALS
BODY MASS INDEX: 18.4 KG/M2 | HEART RATE: 98 BPM | WEIGHT: 100 LBS | HEIGHT: 62 IN | SYSTOLIC BLOOD PRESSURE: 122 MMHG | DIASTOLIC BLOOD PRESSURE: 76 MMHG

## 2018-04-18 DIAGNOSIS — M70.62 GREATER TROCHANTERIC BURSITIS OF LEFT HIP: ICD-10-CM

## 2018-04-18 DIAGNOSIS — Z97.8 PRESENCE OF INTRATHECAL PUMP: Primary | ICD-10-CM

## 2018-04-18 DIAGNOSIS — M96.1 FAILED BACK SURGICAL SYNDROME: ICD-10-CM

## 2018-04-18 PROCEDURE — 99213 OFFICE O/P EST LOW 20 MIN: CPT | Performed by: NURSE PRACTITIONER

## 2018-04-18 NOTE — PATIENT INSTRUCTIONS
Refill policies:    • Allow 2-3 business days for refills; controlled substances may take longer.   • Contact your pharmacy at least 5 days prior to running out of medication and have them send an electronic request or submit request through the [facility] for the entire amount billed. Precertification and Prior Authorizations  If your physician has recommended that you have a procedure or additional testing performed.   [employer] ([facility]) will contact your insurance carrier to obtain pr

## 2018-04-18 NOTE — PROGRESS NOTES
Name: Celia Garza   : 10/21/1965   DOS: 2018     Pain Clinic Follow Up Visit:   Celia Garza is a 45year old female who presents for adjustment of intrathecal pump for her chronic low back pain.  Intrathecal pump currently contains Infumorph sacroiliac joints bilaterally. Extremities: Pain to palpation over left greater trochanteric bursa.     ASSESSMENT AND PLAN:   Presence of intrathecal pump  (primary encounter diagnosis)  Failed back surgical syndrome  Greater trochanteric bursitis of lef

## 2018-04-19 PROBLEM — Z97.8 PRESENCE OF INTRATHECAL PUMP: Status: ACTIVE | Noted: 2018-04-19

## 2018-04-19 NOTE — LETTER
Patient Name: Karine Ramesh        : 10/21/1965       Medical Record #: [id number]    CONSENT FOR PROCEDURES/SEDATION    Date: 4/10/2025       Time: 1:03 PM        1. I authorize the performance upon Karine Ramesh the following:    _______________INTRATHECAL PUMP REFILL               ______________________    2. I authorize Mick MAKI (and whomever is designated as the doctor’s assistant), to perform the above mentioned procedures.    3. If any unforeseen conditions arise during this procedure calling for additional procedures, operations, or medications (including anesthesia and blood transfusion), I  further request and authorize the doctor to do whatever he/she deems advisable in my interest.    4. I consent to the taking and reproduction of any photographs in the course of this procedure for professional purposes.    5. I consent to the administration of such sedation as may be considered necessary or advisable by the physician responsible for this service, with the exception of  _____________________________.    6. I have been informed by my doctor of the nature and purpose of this procedure/sedation, possible alternative methods of treatment, risk involved and possible complications.      Signature of Patient:  ___________________________    Signature of person authorized to consent for patient: Relationship to patient:  ___________________________    ___________________    Witness: ____________________     Date: ______________    Provider: ____________________     Date: ______________  
none

## 2018-04-26 ENCOUNTER — OFFICE VISIT (OUTPATIENT)
Dept: NEUROLOGY | Facility: CLINIC | Age: 53
End: 2018-04-26

## 2018-04-26 VITALS
DIASTOLIC BLOOD PRESSURE: 54 MMHG | HEART RATE: 60 BPM | RESPIRATION RATE: 16 BRPM | SYSTOLIC BLOOD PRESSURE: 100 MMHG | HEIGHT: 62 IN | WEIGHT: 100 LBS | BODY MASS INDEX: 18.4 KG/M2

## 2018-04-26 DIAGNOSIS — M48.061 LUMBAR FORAMINAL STENOSIS: ICD-10-CM

## 2018-04-26 DIAGNOSIS — M51.26 LUMBAR HERNIATED DISC: ICD-10-CM

## 2018-04-26 DIAGNOSIS — M51.9 LUMBAR DISC DISEASE: ICD-10-CM

## 2018-04-26 DIAGNOSIS — M54.16 LUMBAR RADICULOPATHY: Primary | ICD-10-CM

## 2018-04-26 DIAGNOSIS — G89.4 CHRONIC PAIN SYNDROME: ICD-10-CM

## 2018-04-26 DIAGNOSIS — M96.1 LUMBAR POST-LAMINECTOMY SYNDROME: ICD-10-CM

## 2018-04-26 PROCEDURE — 99214 OFFICE O/P EST MOD 30 MIN: CPT | Performed by: PHYSICAL MEDICINE & REHABILITATION

## 2018-04-26 RX ORDER — HYDROCODONE BITARTRATE AND ACETAMINOPHEN 10; 325 MG/1; MG/1
1 TABLET ORAL EVERY 6 HOURS PRN
Qty: 120 TABLET | Refills: 0 | Status: SHIPPED | OUTPATIENT
Start: 2018-07-16 | End: 2018-08-09

## 2018-04-26 RX ORDER — HYDROCODONE BITARTRATE AND ACETAMINOPHEN 10; 325 MG/1; MG/1
1 TABLET ORAL EVERY 6 HOURS PRN
Qty: 120 TABLET | Refills: 0 | Status: SHIPPED | OUTPATIENT
Start: 2018-05-17 | End: 2018-06-16

## 2018-04-26 RX ORDER — HYDROCODONE BITARTRATE AND ACETAMINOPHEN 10; 325 MG/1; MG/1
1 TABLET ORAL EVERY 6 HOURS PRN
Qty: 120 TABLET | Refills: 0 | Status: SHIPPED | OUTPATIENT
Start: 2018-06-16 | End: 2018-07-16

## 2018-04-26 RX ORDER — METHYLPREDNISOLONE 4 MG/1
TABLET ORAL
Qty: 1 PACKAGE | Refills: 0 | Status: SHIPPED | OUTPATIENT
Start: 2018-04-26 | End: 2018-05-09 | Stop reason: ALTCHOICE

## 2018-04-26 NOTE — PATIENT INSTRUCTIONS
As of October 6th 2014, the Drug Enforcement Agency [facility]) is reclassifying all hydrocodone combination medications from Schedule III to Schedule II. This includes medications such as Norco, Vicodin, Lortab, Zohydro, and Vicoprofen.     What this means for y will hold on doing a caudal TORI. She will take a Medrol Dosepak for the new pain. She will continue with no more than 4 of the Norco per day. She will see Dr. Jennifer Briggs for her pump refill. She will follow up in 3 months or sooner if needed.

## 2018-04-26 NOTE — PROGRESS NOTES
Low Back Pain H & P    Chief Complaint: Patient presents with:  Low Back Pain: LOV: 01/23/18. Patient presents for a 3 month f/u low back pain and refill request on her medications.  Patient reports seeing Dr. Pauline Mejia 2 wks ago due to severe pain and states th everywhere including tail bone   • Prediabetes     borderline   • Raynaud disease    • Visual impairment     contacts/glasses       Past Surgical History   Past Surgical History:  No date: ANESTH,SKIN SURGERY,NECK  No date: APPENDECTOMY  No date: BACK SURG \"Arthrocentesis of the left                shoulder bicipital tendon\"  No date: REVISE ULNAR NERVE AT ELBOW      Comment: left    Family History   Family History   Problem Relation Age of Onset   • Cancer Mother    • Diabetes Mother    • Arthritis Mother antalgic gait   Sit to Stand: mild difficulty      Lumbar Spine:    Scoliosis: No scoliosis present     Lumbar Spine Palpation:    Spinous Processes: Non-tender for all Spinous Processes   Z-joints: Tender at  left L5-S1   SIJ: Tender at left SIJ   [employer]

## 2018-05-07 NOTE — TELEPHONE ENCOUNTER
Refill request for methocarbamol 750 mg, take 2 tabs every 8 hrs as needed, #180, no refills    LOV: 4/26/18  NOV: 8/9/18

## 2018-05-08 RX ORDER — METHOCARBAMOL 750 MG/1
TABLET, FILM COATED ORAL
Qty: 180 TABLET | Refills: 0 | Status: SHIPPED | OUTPATIENT
Start: 2018-05-08 | End: 2018-06-08

## 2018-05-08 NOTE — TELEPHONE ENCOUNTER
ITP PUMP Medication received. Verified by this RN and Donnie HUBER. Dose correct and medication stored in lock box.     Rx #: [id number]  LOT #: [email or web address]  Expiration date:   6/6/18

## 2018-05-09 ENCOUNTER — OFFICE VISIT (OUTPATIENT)
Dept: SURGERY | Facility: CLINIC | Age: 53
End: 2018-05-09

## 2018-05-09 VITALS
SYSTOLIC BLOOD PRESSURE: 100 MMHG | HEIGHT: 62 IN | WEIGHT: 100 LBS | HEART RATE: 82 BPM | BODY MASS INDEX: 18.4 KG/M2 | DIASTOLIC BLOOD PRESSURE: 72 MMHG

## 2018-05-09 DIAGNOSIS — M96.1 FAILED BACK SURGICAL SYNDROME: Primary | ICD-10-CM

## 2018-05-09 PROCEDURE — 62370 ANL SP INF PMP W/MDREPRG&FIL: CPT | Performed by: ANESTHESIOLOGY

## 2018-05-09 NOTE — PROGRESS NOTES
Drug: Morphine Sulfate PF 10mg/ml  Dose administered: 10 mg.ml  NDC: [id number]  Lot number: [email or web address]  Expiration: 06/06/18

## 2018-05-09 NOTE — PROGRESS NOTES
58-year-old female patient is here for intrathecal infusion pump refill today. She denies concerns regarding pump functioning. She would like to proceed with the procedure today and signed consent for the procedure.  She finds that pain is worse in the morn

## 2018-05-09 NOTE — PATIENT INSTRUCTIONS
Refill policies:    • Allow 2-3 business days for refills; controlled substances may take longer.   • Contact your pharmacy at least 5 days prior to running out of medication and have them send an electronic request or submit request through the “request re entire amount billed. Precertification and Prior Authorizations: If your physician has recommended that you have a procedure or additional testing performed.   [facility]) will contact your insurance carrier to obtain pre-certi

## 2018-05-11 RX ORDER — METHOCARBAMOL 750 MG/1
TABLET, FILM COATED ORAL
Qty: 180 TABLET | Refills: 0 | OUTPATIENT
Start: 2018-05-11

## 2018-05-13 NOTE — PROCEDURES
[facility]  Operative Report  2018     Skye Rafaelaroula Patient Status:  No patient class for patient encounter    10/21/1965 MRN [id number]   Location [address] [place] [address], [address] [place] [address], [address] [place] [address] Attending No att. providers found   Samson Lester

## 2018-06-07 ENCOUNTER — TELEPHONE (OUTPATIENT)
Dept: SURGERY | Facility: CLINIC | Age: 53
End: 2018-06-07

## 2018-06-07 DIAGNOSIS — M96.1 FAILED BACK SYNDROME OF LUMBAR SPINE: ICD-10-CM

## 2018-06-07 NOTE — TELEPHONE ENCOUNTER
ITP REFILL due by 7/25/18. Please place order for ITP Rx.   Last medication ordered:    morphINE Sulfate (PF) 400 mg in Sodium Chloride 40 mL IT infusion    OV available: Pt. Scheduled for 7/25/18

## 2018-06-08 NOTE — TELEPHONE ENCOUNTER
Patient states Miquel informed her the Methocarbamol is on back order and she would like it called into [address]    Medication requesting Methocarbamol 750mg 2 tabs q8h prn    LOV 4/26/18  NOV 8/9/18    Last filled 5/8/18

## 2018-06-11 RX ORDER — METHOCARBAMOL 750 MG/1
TABLET, FILM COATED ORAL
Qty: 180 TABLET | Refills: 2 | Status: SHIPPED | OUTPATIENT
Start: 2018-06-11 | End: 2018-09-10

## 2018-06-11 NOTE — TELEPHONE ENCOUNTER
Rx faxed to [facility]. Fax confirmation received. Rx hardcopy given to Renetta Nielsen for mailing.

## 2018-06-12 ENCOUNTER — TELEPHONE (OUTPATIENT)
Dept: NEUROLOGY | Facility: CLINIC | Age: 53
End: 2018-06-12

## 2018-07-24 NOTE — TELEPHONE ENCOUNTER
ITP PUMP Medication received. Verified by Twyla Calvert RN and Mookie garcia, [address] [place] [address]. Dose correct and medication stored in lock box.     Rx #: [id number]  LOT #: [email or web address]  Expiration date:   8/22/2018

## 2018-07-25 ENCOUNTER — OFFICE VISIT (OUTPATIENT)
Dept: PAIN CLINIC | Facility: CLINIC | Age: 53
End: 2018-07-25
Payer: MEDICARE

## 2018-07-25 VITALS
OXYGEN SATURATION: 95 % | SYSTOLIC BLOOD PRESSURE: 112 MMHG | HEART RATE: 76 BPM | HEIGHT: 62 IN | BODY MASS INDEX: 18.77 KG/M2 | DIASTOLIC BLOOD PRESSURE: 72 MMHG | WEIGHT: 102 LBS

## 2018-07-25 DIAGNOSIS — Z97.8 PRESENCE OF INTRATHECAL PUMP: Primary | ICD-10-CM

## 2018-07-25 PROCEDURE — 62370 ANL SP INF PMP W/MDREPRG&FIL: CPT | Performed by: ANESTHESIOLOGY

## 2018-07-25 RX ORDER — LIDOCAINE HYDROCHLORIDE 10 MG/ML
2 INJECTION, SOLUTION INFILTRATION; PERINEURAL ONCE
Status: COMPLETED | OUTPATIENT
Start: 2018-07-25 | End: 2018-07-25

## 2018-07-25 NOTE — TELEPHONE ENCOUNTER
Controlled Substance Delivery Record mailed to [facility]. Copy sent to scan. Alexiad Etelvina Kingsley RN witnessed.

## 2018-07-30 NOTE — PROCEDURES
[facility]  Operative Report  2018     Shawnakrystle Mitchell Patient Status:  No patient class for patient encounter    10/21/1965 MRN [id number]   Location ED [facility], [address], [address] Attending No att. providers found   [facility]

## 2018-08-09 ENCOUNTER — OFFICE VISIT (OUTPATIENT)
Dept: NEUROLOGY | Facility: CLINIC | Age: 53
End: 2018-08-09
Payer: MEDICARE

## 2018-08-09 VITALS
SYSTOLIC BLOOD PRESSURE: 110 MMHG | HEART RATE: 82 BPM | HEIGHT: 62 IN | BODY MASS INDEX: 18.4 KG/M2 | DIASTOLIC BLOOD PRESSURE: 60 MMHG | WEIGHT: 100 LBS

## 2018-08-09 DIAGNOSIS — M51.9 LUMBAR DISC DISEASE: ICD-10-CM

## 2018-08-09 DIAGNOSIS — M54.16 LUMBAR RADICULOPATHY: ICD-10-CM

## 2018-08-09 DIAGNOSIS — M54.42 CHRONIC LEFT-SIDED LOW BACK PAIN WITH LEFT-SIDED SCIATICA: ICD-10-CM

## 2018-08-09 DIAGNOSIS — M96.1 LUMBAR POST-LAMINECTOMY SYNDROME: Primary | ICD-10-CM

## 2018-08-09 DIAGNOSIS — M51.26 LUMBAR HERNIATED DISC: ICD-10-CM

## 2018-08-09 DIAGNOSIS — G89.29 CHRONIC LEFT-SIDED LOW BACK PAIN WITH LEFT-SIDED SCIATICA: ICD-10-CM

## 2018-08-09 DIAGNOSIS — M48.061 LUMBAR FORAMINAL STENOSIS: ICD-10-CM

## 2018-08-09 PROCEDURE — 99214 OFFICE O/P EST MOD 30 MIN: CPT | Performed by: PHYSICAL MEDICINE & REHABILITATION

## 2018-08-09 RX ORDER — HYDROCODONE BITARTRATE AND ACETAMINOPHEN 10; 325 MG/1; MG/1
1 TABLET ORAL EVERY 6 HOURS PRN
Qty: 120 TABLET | Refills: 0 | Status: SHIPPED | OUTPATIENT
Start: 2018-09-14 | End: 2018-10-14

## 2018-08-09 RX ORDER — HYDROCODONE BITARTRATE AND ACETAMINOPHEN 10; 325 MG/1; MG/1
1 TABLET ORAL EVERY 6 HOURS PRN
Qty: 120 TABLET | Refills: 0 | Status: SHIPPED | OUTPATIENT
Start: 2018-10-14 | End: 2018-11-13

## 2018-08-09 RX ORDER — HYDROCODONE BITARTRATE AND ACETAMINOPHEN 10; 325 MG/1; MG/1
1 TABLET ORAL EVERY 6 HOURS PRN
Qty: 120 TABLET | Refills: 0 | Status: SHIPPED | OUTPATIENT
Start: 2018-08-15 | End: 2018-09-14

## 2018-08-09 NOTE — PROGRESS NOTES
Low Back Pain H & P    Chief Complaint: Patient presents with:  Low Back Pain: LOV 4-26-18 pt is here to f/u on lower back pain 3 out 10.  Per pt lower back pain has being the same since last office visit, pain is worse in the morning, takes Morphine and No Comment: anterior removal of cervical instrumentation;                C3-4 anterior cervical discectomy and fusion,                with structural allograft and plate                instrumentation  [id number]?: OTHER      Comment: 2 cervical neck surgeries Family h   • RA[other] [OTHER] Maternal Grandmother    • RA[other] [OTHER] Paternal Grandmother        Social History     Social History  Social History   Marital status: Single  Spouse name: N/A    Years of education: N/A  Number of children: N/A     Occ Tibialis posterior pulse-LEFT 2+     Neurological Lower Extremity:    Light Touch Sensation: Intact in bilateral LE except:  Decreased in the  arch of the LEFT foot  lateral LEFT foot  first dorsal web space of the LEFT foot   LE Muscle Strength:  All LE

## 2018-09-06 ENCOUNTER — TELEPHONE (OUTPATIENT)
Dept: SURGERY | Facility: CLINIC | Age: 53
End: 2018-09-06

## 2018-09-06 DIAGNOSIS — M96.1 FAILED BACK SYNDROME OF LUMBAR SPINE: ICD-10-CM

## 2018-09-10 NOTE — TELEPHONE ENCOUNTER
Refill request for methocarbamol 750 mg, take 2 tabs every 8 hrs as needed, #180, 2 refills    LOV: 8/9/18  NOV: 11/13/18  Last refilled on 6/11/18

## 2018-09-11 RX ORDER — METHOCARBAMOL 750 MG/1
TABLET, FILM COATED ORAL
Qty: 180 TABLET | Refills: 2 | Status: SHIPPED | OUTPATIENT
Start: 2018-09-11 | End: 2018-12-13

## 2018-10-01 ENCOUNTER — TELEPHONE (OUTPATIENT)
Dept: NEUROLOGY | Facility: CLINIC | Age: 53
End: 2018-10-01

## 2018-10-01 NOTE — TELEPHONE ENCOUNTER
Request for parking placard renewal was received. Pt has applied online but requires physician's form to validate request with state. Dr. Elida Meredith has signed form. Pt requests phone call before sending form.      Confirmation of application information:  [place]

## 2018-10-03 NOTE — TELEPHONE ENCOUNTER
Spoke to patent and informed her parking placard forms have been completed and will be mailed to her for signature/completion of Part 1. Patient verbalized understanding and was thankful. Forms mailed to patient.  Copy sent to scanning

## 2018-10-04 ENCOUNTER — MED REC SCAN ONLY (OUTPATIENT)
Dept: NEUROLOGY | Facility: CLINIC | Age: 53
End: 2018-10-04

## 2018-10-15 ENCOUNTER — TELEPHONE (OUTPATIENT)
Dept: PAIN CLINIC | Facility: CLINIC | Age: 53
End: 2018-10-15

## 2018-10-15 ENCOUNTER — OFFICE VISIT (OUTPATIENT)
Dept: PAIN CLINIC | Facility: CLINIC | Age: 53
End: 2018-10-15
Payer: MEDICARE

## 2018-10-15 VITALS
DIASTOLIC BLOOD PRESSURE: 80 MMHG | OXYGEN SATURATION: 92 % | HEART RATE: 90 BPM | BODY MASS INDEX: 18.4 KG/M2 | SYSTOLIC BLOOD PRESSURE: 122 MMHG | WEIGHT: 100 LBS | HEIGHT: 62 IN

## 2018-10-15 DIAGNOSIS — M96.1 FAILED BACK SYNDROME OF LUMBAR SPINE: ICD-10-CM

## 2018-10-15 DIAGNOSIS — Z97.8 PRESENCE OF INTRATHECAL PUMP: Primary | ICD-10-CM

## 2018-10-15 PROCEDURE — 62370 ANL SP INF PMP W/MDREPRG&FIL: CPT | Performed by: ANESTHESIOLOGY

## 2018-10-15 RX ORDER — LIDOCAINE HYDROCHLORIDE 10 MG/ML
2 INJECTION, SOLUTION INFILTRATION; PERINEURAL ONCE
Status: COMPLETED | OUTPATIENT
Start: 2018-10-15 | End: 2018-10-15

## 2018-10-15 NOTE — TELEPHONE ENCOUNTER
ITP REFILL due by 1-6-18. Please place order for ITP Rx.   Last medication ordered:      morphINE Sulfate (PF) 400 mg in Sodium Chloride 40 mL IT infusion    OV scheduled 12-31-18 @ 11:30

## 2018-10-15 NOTE — TELEPHONE ENCOUNTER
Controlled substance delivery record mailed to , copy sent to scan. Morphine 7 mls wasted, Dr. Annabelle Geiger witnessed.

## 2018-10-15 NOTE — PATIENT INSTRUCTIONS
Refill policies:    • Allow 2-3 business days for refills; controlled substances may take longer.   • Contact your pharmacy at least 5 days prior to running out of medication and have them send an electronic request or submit request through the “request re entire amount billed. Precertification and Prior Authorizations: If your physician has recommended that you have a procedure or additional testing performed.   [facility]) will contact your insurance carrier to obtain pre-certi

## 2018-10-15 NOTE — TELEPHONE ENCOUNTER
ITP PUMP Medication received. Verified by Olga Grover and Malathi Johnson RN  Dose correct and medication stored in lock box.     Rx #: [id number]  LOT #: [email or web address]  Expiration date:   11/10/18

## 2018-10-26 NOTE — PROGRESS NOTES
[facility]  Operative Report  10/15/2018     Petey Khan Patient Status:  No patient class for patient encounter    10/21/1965 MRN [id number]   Location [address] [place] [address], [address] [place] [address], [address] [place] [address] Attending No att. providers found   [facility]

## 2018-11-13 ENCOUNTER — TELEPHONE (OUTPATIENT)
Dept: NEUROLOGY | Facility: CLINIC | Age: 53
End: 2018-11-13

## 2018-11-13 ENCOUNTER — OFFICE VISIT (OUTPATIENT)
Dept: NEUROLOGY | Facility: CLINIC | Age: 53
End: 2018-11-13
Payer: MEDICARE

## 2018-11-13 VITALS
HEIGHT: 62 IN | WEIGHT: 100 LBS | HEART RATE: 76 BPM | SYSTOLIC BLOOD PRESSURE: 118 MMHG | DIASTOLIC BLOOD PRESSURE: 70 MMHG | BODY MASS INDEX: 18.4 KG/M2 | RESPIRATION RATE: 16 BRPM

## 2018-11-13 DIAGNOSIS — G89.4 CHRONIC PAIN SYNDROME: Primary | ICD-10-CM

## 2018-11-13 DIAGNOSIS — M54.42 CHRONIC LEFT-SIDED LOW BACK PAIN WITH LEFT-SIDED SCIATICA: ICD-10-CM

## 2018-11-13 DIAGNOSIS — M48.061 LUMBAR FORAMINAL STENOSIS: ICD-10-CM

## 2018-11-13 DIAGNOSIS — M96.1 LUMBAR POST-LAMINECTOMY SYNDROME: ICD-10-CM

## 2018-11-13 DIAGNOSIS — G89.29 CHRONIC LEFT-SIDED LOW BACK PAIN WITH LEFT-SIDED SCIATICA: ICD-10-CM

## 2018-11-13 DIAGNOSIS — M70.62 GREATER TROCHANTERIC BURSITIS OF LEFT HIP: ICD-10-CM

## 2018-11-13 DIAGNOSIS — M51.9 LUMBAR DISC DISEASE: ICD-10-CM

## 2018-11-13 DIAGNOSIS — M54.16 LUMBAR RADICULOPATHY: ICD-10-CM

## 2018-11-13 DIAGNOSIS — M51.26 LUMBAR HERNIATED DISC: ICD-10-CM

## 2018-11-13 PROCEDURE — 99214 OFFICE O/P EST MOD 30 MIN: CPT | Performed by: PHYSICAL MEDICINE & REHABILITATION

## 2018-11-13 RX ORDER — HYDROCODONE BITARTRATE AND ACETAMINOPHEN 10; 325 MG/1; MG/1
1 TABLET ORAL EVERY 6 HOURS PRN
Qty: 120 TABLET | Refills: 0 | Status: SHIPPED | OUTPATIENT
Start: 2018-12-13 | End: 2018-11-13

## 2018-11-13 RX ORDER — HYDROCODONE BITARTRATE AND ACETAMINOPHEN 10; 325 MG/1; MG/1
1 TABLET ORAL EVERY 6 HOURS PRN
Qty: 120 TABLET | Refills: 0 | Status: SHIPPED | OUTPATIENT
Start: 2018-11-13 | End: 2018-11-13

## 2018-11-13 RX ORDER — HYDROCODONE BITARTRATE AND ACETAMINOPHEN 10; 325 MG/1; MG/1
1 TABLET ORAL EVERY 6 HOURS PRN
Qty: 120 TABLET | Refills: 0 | Status: SHIPPED | OUTPATIENT
Start: 2019-01-12 | End: 2019-02-07

## 2018-11-13 NOTE — TELEPHONE ENCOUNTER
Medicare Online for insurance coverage of  Left greater trochanteric bursal injection  Cpt codes 83271, [id number], . Insurance was verified and procedure is a covered benefit and does not require authorization.   Will inform Nursing

## 2018-11-13 NOTE — PATIENT INSTRUCTIONS
As of October 6th 2014, the Drug Enforcement Agency [facility]) is reclassifying all hydrocodone combination medications from Schedule III to Schedule II. This includes medications such as Norco, Vicodin, Lortab, Zohydro, and Vicoprofen.     What this means for y patient will continue with their current pain medications. The patient will continue with her home exercise program.    I will do a left greater trochanteric bursal injection in the future when she is ready.     She will follow up in 3 months or sooner i

## 2018-11-13 NOTE — PROGRESS NOTES
Low Back Pain H & P    Chief Complaint: Patient presents with:  Low Back Pain: Patient here for follow up. States since LOV 8/9/2018 patient's constant low back pain is unchanged.  Describes pain as dull ache which radiates to LLE described as throbbiing @ contacts/glasses       Past Surgical History   Past Surgical History:   Procedure Laterality Date   • ANESTH,SKIN SURGERY,NECK     • APPENDECTOMY     • BACK SURGERY      multiple   • CARPAL TUNNEL RELEASE      bilateral   • HAND/FINGER SURGERY UNLISTED left shoulder joint\" x2   • OTHER SURGICAL HISTORY      per [employer]:  \"Arthrocentesis of the left shoulder bicipital tendon\"   • REVISE ULNAR NERVE AT ELBOW      left   • SPINAL CORD STIMULATION TRIAL N/A 6/6/2016    Performed by Lo Ho MD at [facility]        Weight Concern: Not Asked        Special Diet: Not Asked        Back Care: Not Asked        Exercise: Yes          home exercises in bed        Bike Helmet: Not Asked        Seat Belt: Not Asked        Self-Exams: Not Asked    Social History N Reflexes: 2+ in bilateral lower extremities     Assessment  1. Chronic pain syndrome    2. left > right L5-S1 chronic radiculopathy    3. s/p left L3-5 laminectomies    4. Chronic left-sided low back pain with left-sided sciatica    5.  L5-S1 left mod for

## 2018-11-15 ENCOUNTER — MED REC SCAN ONLY (OUTPATIENT)
Dept: NEUROLOGY | Facility: CLINIC | Age: 53
End: 2018-11-15

## 2018-12-13 RX ORDER — METHOCARBAMOL 750 MG/1
TABLET, FILM COATED ORAL
Qty: 180 TABLET | Refills: 2 | Status: SHIPPED | OUTPATIENT
Start: 2018-12-13 | End: 2019-06-05

## 2018-12-13 NOTE — TELEPHONE ENCOUNTER
Medication request: Methocarbamol 750mg. Take 2 tabs every 8 hours prn pain.  #180. 2 refills    LOV-11/13/2018  NOV-2/25/2018    [place]11/2018 with 2 refills

## 2018-12-18 ENCOUNTER — OFFICE VISIT (OUTPATIENT)
Dept: NEUROLOGY | Facility: CLINIC | Age: 53
End: 2018-12-18
Payer: MEDICARE

## 2018-12-18 VITALS
HEIGHT: 64 IN | DIASTOLIC BLOOD PRESSURE: 62 MMHG | BODY MASS INDEX: 17.07 KG/M2 | HEART RATE: 76 BPM | SYSTOLIC BLOOD PRESSURE: 110 MMHG | WEIGHT: 100 LBS

## 2018-12-18 DIAGNOSIS — M70.62 GREATER TROCHANTERIC BURSITIS OF LEFT HIP: Primary | ICD-10-CM

## 2018-12-18 PROCEDURE — 20610 DRAIN/INJ JOINT/BURSA W/O US: CPT | Performed by: PHYSICAL MEDICINE & REHABILITATION

## 2018-12-18 RX ORDER — LIDOCAINE HYDROCHLORIDE 10 MG/ML
2.5 INJECTION, SOLUTION INFILTRATION; PERINEURAL ONCE
Status: COMPLETED | OUTPATIENT
Start: 2018-12-18 | End: 2018-12-18

## 2018-12-18 RX ORDER — TRIAMCINOLONE ACETONIDE 40 MG/ML
60 INJECTION, SUSPENSION INTRA-ARTICULAR; INTRAMUSCULAR ONCE
Status: COMPLETED | OUTPATIENT
Start: 2018-12-18 | End: 2018-12-18

## 2018-12-19 ENCOUNTER — MED REC SCAN ONLY (OUTPATIENT)
Dept: NEUROLOGY | Facility: CLINIC | Age: 53
End: 2018-12-19

## 2018-12-27 NOTE — TELEPHONE ENCOUNTER
ITP PUMP Medication received. Verified by Krissy Marinelli and BODØ RN. Dose correct and medication stored in lock box.     Rx #: [id number]  LOT #: [email or web address]  Expiration date: 1/25/19

## 2018-12-31 ENCOUNTER — OFFICE VISIT (OUTPATIENT)
Dept: PAIN CLINIC | Facility: CLINIC | Age: 53
End: 2018-12-31
Payer: MEDICARE

## 2018-12-31 VITALS — OXYGEN SATURATION: 97 % | WEIGHT: 100 LBS | BODY MASS INDEX: 17.07 KG/M2 | HEIGHT: 64 IN | HEART RATE: 96 BPM

## 2018-12-31 DIAGNOSIS — M96.1 FAILED BACK SURGICAL SYNDROME: Primary | ICD-10-CM

## 2018-12-31 PROCEDURE — 62370 ANL SP INF PMP W/MDREPRG&FIL: CPT | Performed by: ANESTHESIOLOGY

## 2018-12-31 NOTE — PATIENT INSTRUCTIONS
Refill policies:    • Allow 2-3 business days for refills; controlled substances may take longer.   • Contact your pharmacy at least 5 days prior to running out of medication and have them send an electronic request or submit request through the “request re entire amount billed. Precertification and Prior Authorizations: If your physician has recommended that you have a procedure or additional testing performed.   [facility]) will contact your insurance carrier to obtain pre-certi

## 2018-12-31 NOTE — PROGRESS NOTES
[facility]  Operative Report  2018     Lovely Bill Patient Status:  No patient class for patient encounter    10/21/1965 MRN [id number]   Location [address] [place] [address], [address] [place] [address], [address] [place] [address] Attending No att. providers found   [facility]

## 2019-01-02 NOTE — PROCEDURES
The patient is here for a left greater trochanteric bursal injection. The left greater trochanteric bursa was identified via palpation and a polina was placed on the patient's skin. The skin was cleaned with alcohol swabs x 3.   Then a 25 gauge needle was ins

## 2019-01-13 ENCOUNTER — PATIENT MESSAGE (OUTPATIENT)
Dept: NEUROLOGY | Facility: CLINIC | Age: 54
End: 2019-01-13

## 2019-01-14 NOTE — TELEPHONE ENCOUNTER
From: Tim Ugalde  To: Richard Solorzano MD  Sent: 1/13/2019 10:07 PM CST  Subject: Prescription Question    Dr Alyson Nixon starting in February, could you start to fill my prescription for 90 days at a time by calling [facility] pharmacy at 6

## 2019-01-16 NOTE — TELEPHONE ENCOUNTER
Methocarbamol 750mg~2 tabs q8h prn #180 r-2 last filled 12/13/18    LOV 12/18/18  NOV 2/25/19    Patient requesting 3 month supply to be sent to mail order pharmacy

## 2019-01-17 RX ORDER — METHOCARBAMOL 750 MG/1
1500 TABLET, FILM COATED ORAL EVERY 8 HOURS PRN
Qty: 540 TABLET | Refills: 0 | Status: SHIPPED | OUTPATIENT
Start: 2019-02-11 | End: 2019-03-21

## 2019-02-05 ENCOUNTER — TELEPHONE (OUTPATIENT)
Dept: PAIN CLINIC | Facility: CLINIC | Age: 54
End: 2019-02-05

## 2019-02-05 DIAGNOSIS — M96.1 FAILED BACK SYNDROME OF LUMBAR SPINE: ICD-10-CM

## 2019-02-05 NOTE — TELEPHONE ENCOUNTER
ITP infusion reviewed and pended for signature    Medication: Morphine sulfate  Concentration: 10mg/mL  Syring/infusion: 40mL    Total: Morphine sulfate 400mg in 40mL infusion/syringe

## 2019-02-05 NOTE — TELEPHONE ENCOUNTER
ITP REFILL due by 3/24/19. Please place order for ITP Rx. Last medication ordered:  morphINE Sulfate (PF) 400 mg in Sodium Chloride 40 mL IT infusion    OV available 3/18/19.

## 2019-02-07 NOTE — TELEPHONE ENCOUNTER
Rx faxed to . Fax confirmation received. Rx hardcopy mailed to pharmacy.  emailed with upcoming refill information.

## 2019-02-07 NOTE — TELEPHONE ENCOUNTER
Refill request for norco 10/325 mg, take 1 tab every 6 hrs as needed, #120, no refills    LOV: 12/18/18  NOV: 2/25/19  Last [place]

## 2019-02-11 RX ORDER — HYDROCODONE BITARTRATE AND ACETAMINOPHEN 10; 325 MG/1; MG/1
1 TABLET ORAL EVERY 6 HOURS PRN
Qty: 120 TABLET | Refills: 0 | Status: SHIPPED | OUTPATIENT
Start: 2019-02-11 | End: 2019-02-25

## 2019-02-25 ENCOUNTER — OFFICE VISIT (OUTPATIENT)
Dept: NEUROLOGY | Facility: CLINIC | Age: 54
End: 2019-02-25
Payer: MEDICARE

## 2019-02-25 VITALS — RESPIRATION RATE: 14 BRPM | SYSTOLIC BLOOD PRESSURE: 118 MMHG | DIASTOLIC BLOOD PRESSURE: 68 MMHG | HEART RATE: 90 BPM

## 2019-02-25 DIAGNOSIS — M48.061 LUMBAR FORAMINAL STENOSIS: ICD-10-CM

## 2019-02-25 DIAGNOSIS — M54.42 CHRONIC LEFT-SIDED LOW BACK PAIN WITH LEFT-SIDED SCIATICA: ICD-10-CM

## 2019-02-25 DIAGNOSIS — M54.16 LUMBAR RADICULOPATHY: Primary | ICD-10-CM

## 2019-02-25 DIAGNOSIS — M51.26 LUMBAR HERNIATED DISC: ICD-10-CM

## 2019-02-25 DIAGNOSIS — M51.9 LUMBAR DISC DISEASE: ICD-10-CM

## 2019-02-25 DIAGNOSIS — G89.29 CHRONIC LEFT-SIDED LOW BACK PAIN WITH LEFT-SIDED SCIATICA: ICD-10-CM

## 2019-02-25 PROCEDURE — 99214 OFFICE O/P EST MOD 30 MIN: CPT | Performed by: PHYSICAL MEDICINE & REHABILITATION

## 2019-02-25 RX ORDER — HYDROCODONE BITARTRATE AND ACETAMINOPHEN 10; 325 MG/1; MG/1
1 TABLET ORAL EVERY 6 HOURS PRN
Qty: 120 TABLET | Refills: 0 | Status: SHIPPED | OUTPATIENT
Start: 2019-05-13 | End: 2019-05-22

## 2019-02-25 RX ORDER — HYDROCODONE BITARTRATE AND ACETAMINOPHEN 10; 325 MG/1; MG/1
1 TABLET ORAL EVERY 6 HOURS PRN
Qty: 120 TABLET | Refills: 0 | Status: SHIPPED | OUTPATIENT
Start: 2019-03-14 | End: 2019-05-22

## 2019-02-25 RX ORDER — HYDROCODONE BITARTRATE AND ACETAMINOPHEN 10; 325 MG/1; MG/1
1 TABLET ORAL EVERY 6 HOURS PRN
Qty: 120 TABLET | Refills: 0 | Status: SHIPPED | OUTPATIENT
Start: 2019-04-13 | End: 2019-05-22

## 2019-02-25 NOTE — PROGRESS NOTES
Low Back Pain H & P    Chief Complaint: Patient presents with:  Hip Pain: pt here for follow up after left greater trochanteric bursal injection 12/18/18 with 40% relief in symptoms.  pt c/o left hip pain and low back pain that increases with weather change CARPAL TUNNEL RELEASE      bilateral   • HAND/FINGER SURGERY UNLISTED      left hand   • HIT PAIN IMP PUMP DONNY     • HYSTERECTOMY     • INTRATHECAL INFUSION PUMP DYE TEST N/A 7/31/2017    Performed by Rudi Sanz MD at [facility] MAIN OR   • INTRATHECAL INF STIMULATION TRIAL N/A 6/6/2016    Performed by Kamini Hu MD at [facility] MAIN OR       Family History   Family History   Problem Relation Age of Onset   • Cancer Mother    • Diabetes Mother    • Arthritis Mother    • Other (Other) Mother         jamie Emotionally abused: Not on file        Physically abused: Not on file        Forced sexual activity: Not on file    Other Topics      Concerns:         Service: Not Asked        Blood Transfusions: Not Asked        Caffeine Concern: Yes          1 posterior pulse-LEFT 2+     Neurological Lower Extremity:    Light Touch Sensation: Intact in bilateral LE except:  Decreased in the  first dorsal web space of the LEFT foot   LE Muscle Strength:  All LE strength measurements 5/5 except:  Hamstring RIGHT:

## 2019-03-19 NOTE — TELEPHONE ENCOUNTER
ITP PUMP Medication received. Verified by Franky Merritt RN   and Charisma Mulligan RN. Dose correct and medication stored in lock box.     Rx #: [id number]  LOT #: [email or web address]  Expiration date:   4/17/2019

## 2019-03-19 NOTE — TELEPHONE ENCOUNTER
ITP PUMP Medication received. Verified by Odilia Atwood, VIOLA   and Arleen Garcia RN. Dose correct and medication stored in lock box.     Rx #: [id number]  LOT #: [email or web address]  Expiration date:   4/17/2019

## 2019-03-20 ENCOUNTER — OFFICE VISIT (OUTPATIENT)
Dept: PAIN CLINIC | Facility: CLINIC | Age: 54
End: 2019-03-20
Payer: MEDICARE

## 2019-03-20 VITALS
HEART RATE: 96 BPM | SYSTOLIC BLOOD PRESSURE: 116 MMHG | BODY MASS INDEX: 18.4 KG/M2 | WEIGHT: 100 LBS | DIASTOLIC BLOOD PRESSURE: 64 MMHG | OXYGEN SATURATION: 97 % | HEIGHT: 62 IN

## 2019-03-20 DIAGNOSIS — M96.1 FAILED BACK SURGICAL SYNDROME: Primary | ICD-10-CM

## 2019-03-20 PROCEDURE — 62370 ANL SP INF PMP W/MDREPRG&FIL: CPT | Performed by: ANESTHESIOLOGY

## 2019-03-20 RX ORDER — LIDOCAINE HYDROCHLORIDE 10 MG/ML
2 INJECTION, SOLUTION INFILTRATION; PERINEURAL ONCE
Status: COMPLETED | OUTPATIENT
Start: 2019-03-20 | End: 2019-03-20

## 2019-03-20 NOTE — PROGRESS NOTES
Timeout completed prior to procedure @ 2:00 PM.  Participants present for timeout:  Dr. Sadie Painter, and patient.

## 2019-03-21 NOTE — TELEPHONE ENCOUNTER
Medication request: methocarbamol 750 MG Oral Tab, #540, no refill  Take 2 tablets (1,500 mg total) by mouth every 8 (eight) hours as needed.      [place]: 2[place]6/19    LOV: 2/25/19  NOV: None - is to f/u 3-6 months or sooner

## 2019-03-22 RX ORDER — METHOCARBAMOL 750 MG/1
TABLET, FILM COATED ORAL
Qty: 540 TABLET | Refills: 0 | Status: SHIPPED | OUTPATIENT
Start: 2019-03-22 | End: 2019-07-25

## 2019-04-05 NOTE — PROCEDURES
[facility]  Operative Report  3/20/2019     José Luis Basil Patient Status:  No patient class for patient encounter    10/21/1965 MRN [id number]   Location ED [facility], [address], [address] Attending No att. providers found   [facility]

## 2019-04-10 ENCOUNTER — TELEPHONE (OUTPATIENT)
Dept: PAIN CLINIC | Facility: CLINIC | Age: 54
End: 2019-04-10

## 2019-04-10 DIAGNOSIS — M96.1 FAILED BACK SYNDROME OF LUMBAR SPINE: ICD-10-CM

## 2019-04-10 NOTE — TELEPHONE ENCOUNTER
ITP REFILL due by 6/11/19. Please place order for ITP Rx.   Last medication ordered:  morphINE Sulfate (PF) 400 mg in Sodium Chloride 40 mL IT infusion    OV available 6/3/19

## 2019-04-12 NOTE — TELEPHONE ENCOUNTER
ITP medication reviewed and pended for signature    Medication: Morphine sulfate   Concentration: 10mg/mL  Syringe size: 40mL     Morphine sulfate 400mg in 40mL infusion   Due by 6/3/19

## 2019-05-21 ENCOUNTER — PATIENT MESSAGE (OUTPATIENT)
Dept: NEUROLOGY | Facility: CLINIC | Age: 54
End: 2019-05-21

## 2019-05-21 NOTE — TELEPHONE ENCOUNTER
From: Sasha Wheat  To: Stephanie Chen MD  Sent: 5/21/2019 3:09 PM CDT  Subject: Prescription Question    Hi Acacia, could I come pick-up my 3 months of prescription medicines for Hydrocodone, (Norco) , as talked about in my last visit with Dr. Sarah Flynn,

## 2019-05-22 NOTE — TELEPHONE ENCOUNTER
Per [facility] refill policy only one month of opioids given per month unless otherwise specified per the prescribing physician    Medication request: Norco 10-325mg q6h prn pain    LOV  NOV    [place] 5/13/19 #120

## 2019-05-24 RX ORDER — HYDROCODONE BITARTRATE AND ACETAMINOPHEN 10; 325 MG/1; MG/1
1 TABLET ORAL EVERY 6 HOURS PRN
Qty: 120 TABLET | Refills: 0 | Status: SHIPPED | OUTPATIENT
Start: 2019-08-11 | End: 2019-09-06

## 2019-05-24 RX ORDER — HYDROCODONE BITARTRATE AND ACETAMINOPHEN 10; 325 MG/1; MG/1
1 TABLET ORAL EVERY 6 HOURS PRN
Qty: 120 TABLET | Refills: 0 | Status: SHIPPED | OUTPATIENT
Start: 2019-07-12 | End: 2019-06-05

## 2019-05-24 RX ORDER — HYDROCODONE BITARTRATE AND ACETAMINOPHEN 10; 325 MG/1; MG/1
1 TABLET ORAL EVERY 6 HOURS PRN
Qty: 120 TABLET | Refills: 0 | Status: SHIPPED | OUTPATIENT
Start: 2019-06-12 | End: 2019-06-05

## 2019-05-31 NOTE — TELEPHONE ENCOUNTER
Noted patient reschedule appt to 6/5/19      ITP PUMP Medication received. Verified by Joshua Dominguez and Aracelis Wilson.   Dose correct and medication stored in lock box.     Rx #: [id number]  LOT #: [email or web address]  Expiration date:   6/29/19

## 2019-05-31 NOTE — TELEPHONE ENCOUNTER
Noted patient reschedule appt to 6/5/19     ITP PUMP Medication received. Verified by Wiliam Barnett and Arabella LOERA. Dose correct and medication stored in lock box.     Rx #: [id number]  LOT #: [email or web address]  Expiration date:   6/29/19

## 2019-06-05 ENCOUNTER — OFFICE VISIT (OUTPATIENT)
Dept: PAIN CLINIC | Facility: CLINIC | Age: 54
End: 2019-06-05
Payer: MEDICARE

## 2019-06-05 VITALS
BODY MASS INDEX: 18.4 KG/M2 | HEIGHT: 62 IN | DIASTOLIC BLOOD PRESSURE: 64 MMHG | SYSTOLIC BLOOD PRESSURE: 110 MMHG | HEART RATE: 93 BPM | WEIGHT: 100 LBS | OXYGEN SATURATION: 97 %

## 2019-06-05 DIAGNOSIS — Z97.8 PRESENCE OF INTRATHECAL PUMP: Primary | ICD-10-CM

## 2019-06-05 PROCEDURE — 62370 ANL SP INF PMP W/MDREPRG&FIL: CPT | Performed by: ANESTHESIOLOGY

## 2019-06-05 NOTE — PROGRESS NOTES
Timeout completed prior to procedure @ 0072. Participants present for timeout:  Dr. Connie Tim, and patient.

## 2019-06-05 NOTE — PROGRESS NOTES
ITP Refill:    Location; lower back,constant radiates down left leg numbness tingling.     Pain Scale;4-5/10    Norco this morning

## 2019-06-07 RX ORDER — LIDOCAINE HYDROCHLORIDE 10 MG/ML
1 INJECTION, SOLUTION INFILTRATION; PERINEURAL ONCE
Status: SHIPPED | OUTPATIENT
Start: 2019-06-07

## 2019-06-07 NOTE — PROCEDURES
[facility]  Operative Report  2019     Celia Garza Patient Status:  No patient class for patient encounter    10/21/1965 MRN [id number]   Location [facility], [address], [address] Attending No att. providers found   [facility] Day

## 2019-07-05 ENCOUNTER — TELEPHONE (OUTPATIENT)
Dept: PAIN CLINIC | Facility: CLINIC | Age: 54
End: 2019-07-05

## 2019-07-05 DIAGNOSIS — M96.1 FAILED BACK SYNDROME OF LUMBAR SPINE: ICD-10-CM

## 2019-07-05 NOTE — TELEPHONE ENCOUNTER
ITP REFILL due by 8/27/19. Please place order for ITP Rx.   Last medication ordered:  morphINE Sulfate (PF) 400 mg in Sodium Chloride 40 mL IT infusion    OV available 8/19/19

## 2019-07-25 ENCOUNTER — TELEPHONE (OUTPATIENT)
Dept: NEUROLOGY | Facility: CLINIC | Age: 54
End: 2019-07-25

## 2019-07-25 RX ORDER — METHOCARBAMOL 750 MG/1
TABLET, FILM COATED ORAL
Qty: 540 TABLET | Refills: 0 | Status: SHIPPED | OUTPATIENT
Start: 2019-07-25 | End: 2019-10-24

## 2019-07-25 NOTE — TELEPHONE ENCOUNTER
Medication request: Methocarbamol 750mg Oral Tab, #540, no refills  Take 2 tablets every 8 hrs prn. [place]: 4[place]15/19    LOV: 2/25/19  NOV: Not yet scheduled - Rupt[employer] message sent reminding to schedule appt.

## 2019-07-31 NOTE — TELEPHONE ENCOUNTER
Prior authorization completed for Methocarbamol 750mg and faxed to [facility] at [phone]

## 2019-08-14 NOTE — TELEPHONE ENCOUNTER
Spoke to pt who states that she called and rescheduled over the phone, not over [employer]. Pt states she does not know who she spoke with, it was whoever answered when she called, but she was told the appt would be confirmed via [employer].  Informed pt we do no

## 2019-08-14 NOTE — TELEPHONE ENCOUNTER
Patient called again, apologized to patient for confusion and inconvenience.  Explained to patient that rescheduled appointment was not communicated with correct personnel back in July, and unfortunately office does not have medication available for pump re

## 2019-08-16 NOTE — TELEPHONE ENCOUNTER
ITP PUMP Medication received. Verified by Artur Urrutia and CECILE Murguia.   Dose correct and medication stored in lock box.     Rx #: [id number]  LOT #: [email or web address]  Expiration date:   9/14/19

## 2019-08-16 NOTE — TELEPHONE ENCOUNTER
ITP PUMP Medication received. Verified by Aung Nicholas and CECILE Murguia. Dose correct and medication stored in lock box.     Rx #: [id number]  LOT #: [email or web address]  Expiration date:   9/14/19

## 2019-08-19 ENCOUNTER — OFFICE VISIT (OUTPATIENT)
Dept: PAIN CLINIC | Facility: CLINIC | Age: 54
End: 2019-08-19
Payer: MEDICARE

## 2019-08-19 VITALS
HEIGHT: 62 IN | BODY MASS INDEX: 18.4 KG/M2 | OXYGEN SATURATION: 98 % | SYSTOLIC BLOOD PRESSURE: 112 MMHG | DIASTOLIC BLOOD PRESSURE: 64 MMHG | HEART RATE: 88 BPM | WEIGHT: 100 LBS

## 2019-08-19 DIAGNOSIS — Z97.8 PRESENCE OF INTRATHECAL PUMP: Primary | ICD-10-CM

## 2019-08-19 PROCEDURE — 62370 ANL SP INF PMP W/MDREPRG&FIL: CPT | Performed by: ANESTHESIOLOGY

## 2019-08-19 RX ORDER — LIDOCAINE HYDROCHLORIDE 10 MG/ML
2 INJECTION, SOLUTION INFILTRATION; PERINEURAL ONCE
Status: COMPLETED | OUTPATIENT
Start: 2019-08-19 | End: 2019-08-19

## 2019-08-19 NOTE — PROGRESS NOTES
Patient presents in office today with reported pain in Low back with sciatica down leg.  Patient reports pain has been worse recently (a week and a half to two weeks)    Current pain level reported = 5/10    Last reported dose of HYDROcodone-acetaminophen 1

## 2019-08-19 NOTE — PROGRESS NOTES
Timeout completed prior to procedure @ 2:20 PM.  Participants present for timeout:  Dr. Gerhardt Ditty , and patient.     Wasted 11.2 ml Morphine sulfate

## 2019-08-24 NOTE — PROCEDURES
[facility]  Operative Report  2019     Jg Quevedo Patient Status:  No patient class for patient encounter    10/21/1965 MRN [id number]   Location [address] [place] [address], [address] [place] [address], [address] [place] [address] Attending No att. providers found   [facility]

## 2019-09-04 ENCOUNTER — TELEPHONE (OUTPATIENT)
Dept: PAIN CLINIC | Facility: CLINIC | Age: 54
End: 2019-09-04

## 2019-09-04 DIAGNOSIS — M96.1 FAILED BACK SYNDROME OF LUMBAR SPINE: ICD-10-CM

## 2019-09-04 NOTE — TELEPHONE ENCOUNTER
ITP REFILL due by 11/10/19  Please place order for ITP Rx.   Last medication ordered: morphINE Sulfate (PF) 400 mg in Sodium Chloride 40 mL IT infusion    OV available 10/30/19

## 2019-09-05 ENCOUNTER — PATIENT MESSAGE (OUTPATIENT)
Dept: NEUROLOGY | Facility: CLINIC | Age: 54
End: 2019-09-05

## 2019-09-06 NOTE — TELEPHONE ENCOUNTER
Medication request: Norco 10-325mg q6h prn pain    LOV 2/25/19  NOV 9/30/19    [place]: 8/11/19    Per Dr. Arleth Pillai at [facility] continue with no more than 4 Norco per day

## 2019-09-06 NOTE — TELEPHONE ENCOUNTER
From: Elizabeth Blevins  To: Mary Schulz MD  Sent: 9/5/2019 8:54 PM CDT  Subject: Prescription Question    Estiven Link, can I come  my prescription for hydrocodone ([place]) before the 10th of September.  My appointment with Dr Jaci Araiza is not untill the

## 2019-09-09 RX ORDER — HYDROCODONE BITARTRATE AND ACETAMINOPHEN 10; 325 MG/1; MG/1
1 TABLET ORAL EVERY 6 HOURS PRN
Qty: 120 TABLET | Refills: 0 | Status: SHIPPED | OUTPATIENT
Start: 2019-09-10 | End: 2019-09-30

## 2019-09-30 ENCOUNTER — OFFICE VISIT (OUTPATIENT)
Dept: NEUROLOGY | Facility: CLINIC | Age: 54
End: 2019-09-30
Payer: MEDICARE

## 2019-09-30 VITALS — DIASTOLIC BLOOD PRESSURE: 58 MMHG | SYSTOLIC BLOOD PRESSURE: 116 MMHG | HEART RATE: 90 BPM | RESPIRATION RATE: 13 BRPM

## 2019-09-30 DIAGNOSIS — M48.061 LUMBAR FORAMINAL STENOSIS: ICD-10-CM

## 2019-09-30 DIAGNOSIS — G89.4 CHRONIC PAIN SYNDROME: ICD-10-CM

## 2019-09-30 DIAGNOSIS — M51.9 LUMBAR DISC DISEASE: ICD-10-CM

## 2019-09-30 DIAGNOSIS — M96.1 FAILED BACK SYNDROME OF LUMBAR SPINE: ICD-10-CM

## 2019-09-30 DIAGNOSIS — M54.16 LUMBAR RADICULOPATHY: Primary | ICD-10-CM

## 2019-09-30 DIAGNOSIS — M96.1 LUMBAR POST-LAMINECTOMY SYNDROME: ICD-10-CM

## 2019-09-30 DIAGNOSIS — L98.9 SKIN LESION OF RIGHT LEG: ICD-10-CM

## 2019-09-30 DIAGNOSIS — M54.42 CHRONIC LEFT-SIDED LOW BACK PAIN WITH LEFT-SIDED SCIATICA: ICD-10-CM

## 2019-09-30 DIAGNOSIS — M51.26 LUMBAR HERNIATED DISC: ICD-10-CM

## 2019-09-30 DIAGNOSIS — G89.29 CHRONIC LEFT-SIDED LOW BACK PAIN WITH LEFT-SIDED SCIATICA: ICD-10-CM

## 2019-09-30 PROCEDURE — 99214 OFFICE O/P EST MOD 30 MIN: CPT | Performed by: PHYSICAL MEDICINE & REHABILITATION

## 2019-09-30 RX ORDER — HYDROCODONE BITARTRATE AND ACETAMINOPHEN 10; 325 MG/1; MG/1
1 TABLET ORAL EVERY 6 HOURS PRN
Qty: 120 TABLET | Refills: 0 | Status: SHIPPED | OUTPATIENT
Start: 2019-10-10 | End: 2019-11-09

## 2019-09-30 RX ORDER — HYDROCODONE BITARTRATE AND ACETAMINOPHEN 10; 325 MG/1; MG/1
1 TABLET ORAL EVERY 6 HOURS PRN
Qty: 120 TABLET | Refills: 0 | Status: SHIPPED | OUTPATIENT
Start: 2019-12-09 | End: 2020-01-02

## 2019-09-30 RX ORDER — HYDROCODONE BITARTRATE AND ACETAMINOPHEN 10; 325 MG/1; MG/1
1 TABLET ORAL EVERY 6 HOURS PRN
Qty: 120 TABLET | Refills: 0 | Status: SHIPPED | OUTPATIENT
Start: 2019-11-09 | End: 2020-01-02

## 2019-09-30 NOTE — PROGRESS NOTES
Low Back Pain H & P    Chief Complaint: Patient presents with:  Low Back Pain: pt here for follow up with continued lower back pain and left hip pain that increases with weather changes. LOV 2/25/19    Nursing note reviewed and verified.     Patient was las • Neuropathy     hands, left leg worse than right   • Osteoarthritis     everywhere including tail bone   • Prediabetes     borderline   • Raynaud disease    • Screening for human papillomavirus (HPV) 07/11/2016    normal   • Visual impairment     contac per [employer]:  \"left shoulder surgery x's 2\"   • OTHER SURGICAL HISTORY  2010    per NextGen:  \"ACDF\":   • OTHER SURGICAL HISTORY  2012    per NextGen:  \"removal of pain med pump\"   • OTHER SURGICAL HISTORY      per Next[employer]:  \"Arthrocentesis of th session: Not on file      Stress: Not on file    Relationships      Social connections:        Talks on phone: Not on file        Gets together: Not on file        Attends [employer] service: Not on file        Active member of club or organization: Not on Vascular lower extremity:   Dorsalis pedis pulse-RIGHT 2+   Dorsalis pedis pulse-LEFT 2+   Tibialis posterior pulse-RIGHT 2+   Tibialis posterior pulse-LEFT 2+     Neurological Lower Extremity:    Light Touch Sensation: Intact in bilateral Lower Extre

## 2019-10-02 ENCOUNTER — MED REC SCAN ONLY (OUTPATIENT)
Dept: NEUROLOGY | Facility: CLINIC | Age: 54
End: 2019-10-02

## 2019-10-24 RX ORDER — METHOCARBAMOL 750 MG/1
TABLET, FILM COATED ORAL
Qty: 540 TABLET | Refills: 0 | Status: SHIPPED | OUTPATIENT
Start: 2019-10-24 | End: 2020-01-21

## 2019-10-24 NOTE — TELEPHONE ENCOUNTER
Medication request: Methocarbamol 750mg Oral Tab, #540, no refills  Take 2 tabs q8hr PRN pain    [place]: 8/6/19    LOV: 9/30/19  NOV: 3/30/2020

## 2019-10-29 NOTE — TELEPHONE ENCOUNTER
ITP PUMP Medication received. Verified by Ander Guevara RN. Dose correct and medication stored in lock box.     Rx #: [id number]  LOT #: [email or web address]  Expiration date:   11/27/2019

## 2019-10-29 NOTE — TELEPHONE ENCOUNTER
ITP PUMP Medication received. Verified by Moisés Escalante and Kole Nguyen RN.   Dose correct and medication stored in lock box.     Rx #: [id number]  LOT #: [email or web address]  Expiration date:   11/27/2019

## 2019-10-30 ENCOUNTER — TELEPHONE (OUTPATIENT)
Dept: PAIN CLINIC | Facility: CLINIC | Age: 54
End: 2019-10-30

## 2019-10-30 ENCOUNTER — OFFICE VISIT (OUTPATIENT)
Dept: PAIN CLINIC | Facility: CLINIC | Age: 54
End: 2019-10-30
Payer: MEDICARE

## 2019-10-30 VITALS
HEIGHT: 62 IN | BODY MASS INDEX: 18.4 KG/M2 | SYSTOLIC BLOOD PRESSURE: 124 MMHG | HEART RATE: 83 BPM | OXYGEN SATURATION: 93 % | WEIGHT: 100 LBS | DIASTOLIC BLOOD PRESSURE: 68 MMHG

## 2019-10-30 DIAGNOSIS — Z97.8 PRESENCE OF INTRATHECAL PUMP: Primary | ICD-10-CM

## 2019-10-30 DIAGNOSIS — M96.1 FAILED BACK SYNDROME OF LUMBAR SPINE: ICD-10-CM

## 2019-10-30 PROCEDURE — 62370 ANL SP INF PMP W/MDREPRG&FIL: CPT | Performed by: ANESTHESIOLOGY

## 2019-10-30 RX ORDER — LIDOCAINE HYDROCHLORIDE 10 MG/ML
1 INJECTION, SOLUTION INFILTRATION; PERINEURAL ONCE
Status: COMPLETED | OUTPATIENT
Start: 2019-10-30 | End: 2019-10-30

## 2019-10-30 NOTE — TELEPHONE ENCOUNTER
ITP REFILL due by 1/16/20  Please place order for ITP Rx.   Last medication ordered:  morphINE Sulfate (PF) 400 mg in Sodium Chloride 40 mL IT infusion    OV scheduled 1/8/20

## 2019-10-30 NOTE — PROGRESS NOTES
Timeout completed prior to procedure @ 1:28 PM .  Participants present for timeout:  Dr. Bryon Marlow , and patient.

## 2019-10-30 NOTE — PROGRESS NOTES
Patient presents in office today with reported pain in left sided low back with radicular pain to left foot. Patient would like pump refill/increase today.     Current pain level reported = 5/10    Last reported dose of HYDROcodone-acetaminophen  MG O

## 2019-11-01 NOTE — PROCEDURES
[facility]  Operative Report  10/31/2019     Sanjay Javierpriscila Patient Status:  No patient class for patient encounter    10/21/1965 MRN [id number]   Location [address] [place] [address], [address] [place] [address], [address] [place] [address] Attending No att. providers found   [facility]

## 2020-01-01 ENCOUNTER — PATIENT MESSAGE (OUTPATIENT)
Dept: NEUROLOGY | Facility: CLINIC | Age: 55
End: 2020-01-01

## 2020-01-02 RX ORDER — HYDROCODONE BITARTRATE AND ACETAMINOPHEN 10; 325 MG/1; MG/1
1 TABLET ORAL EVERY 6 HOURS PRN
Qty: 120 TABLET | Refills: 0 | Status: SHIPPED | OUTPATIENT
Start: 2020-01-08 | End: 2020-02-07

## 2020-01-02 RX ORDER — HYDROCODONE BITARTRATE AND ACETAMINOPHEN 10; 325 MG/1; MG/1
1 TABLET ORAL EVERY 6 HOURS PRN
Qty: 120 TABLET | Refills: 0 | Status: SHIPPED | OUTPATIENT
Start: 2020-02-07 | End: 2020-03-08

## 2020-01-02 RX ORDER — HYDROCODONE BITARTRATE AND ACETAMINOPHEN 10; 325 MG/1; MG/1
1 TABLET ORAL EVERY 6 HOURS PRN
Qty: 120 TABLET | Refills: 0 | Status: SHIPPED | OUTPATIENT
Start: 2020-03-08 | End: 2020-03-30

## 2020-01-02 NOTE — TELEPHONE ENCOUNTER
From: Lilli Joya  To: Meredith Clay MD  Sent: 1/1/2020 9:43 AM CST  Subject: Prescription Question    Hi Viv Suggs, Happy New Year, could I come before January 10th to pickup my prescription for my 3 months supply of hydrocodone (Norco) my appointment

## 2020-01-02 NOTE — TELEPHONE ENCOUNTER
Medication request: Norco 10-325mg q6h prn pain    LOV 9/30/19  NOV 3/30/20    [place] 12/9/19 #120

## 2020-01-08 ENCOUNTER — OFFICE VISIT (OUTPATIENT)
Dept: PAIN CLINIC | Facility: CLINIC | Age: 55
End: 2020-01-08
Payer: MEDICARE

## 2020-01-08 VITALS
WEIGHT: 100 LBS | OXYGEN SATURATION: 98 % | SYSTOLIC BLOOD PRESSURE: 118 MMHG | HEIGHT: 62 IN | BODY MASS INDEX: 18.4 KG/M2 | DIASTOLIC BLOOD PRESSURE: 60 MMHG | HEART RATE: 48 BPM

## 2020-01-08 DIAGNOSIS — Z97.8 PRESENCE OF INTRATHECAL PUMP: Primary | ICD-10-CM

## 2020-01-08 PROCEDURE — 62370 ANL SP INF PMP W/MDREPRG&FIL: CPT | Performed by: ANESTHESIOLOGY

## 2020-01-08 RX ORDER — LIDOCAINE HYDROCHLORIDE 10 MG/ML
1 INJECTION, SOLUTION INFILTRATION; PERINEURAL ONCE
Status: COMPLETED | OUTPATIENT
Start: 2020-01-08 | End: 2020-01-08

## 2020-01-08 NOTE — PROGRESS NOTES
Patient presents in office today with reported pain in lower back left side down the left leg     Current pain level reported = 2/10    Last reported dose of HYDROcodone-acetaminophen  MG Oral Tab today in the am

## 2020-01-08 NOTE — TELEPHONE ENCOUNTER
ITP PUMP Medication received. Verified by Stephen Reyna and Dina Quiroz RN. Dose correct and medication stored in lock box.     Rx #: [id number]  LOT #: [email or web address]  Expiration date:   2/5/2020

## 2020-01-08 NOTE — TELEPHONE ENCOUNTER
ITP PUMP Medication received. Verified by Chris Salazar and Charisma Child RN .   Dose correct and medication stored in lock box.     Rx #: [id number]  LOT #: [email or web address]  Expiration date:   2/5/2020

## 2020-01-08 NOTE — PROGRESS NOTES
Timeout completed prior to procedure @ 1320. Participants present for timeout:  Dr. Brittney Hidalgo, and patient.       Wasted 11.5 ml    Expected per [employer]- 8.9 ml

## 2020-01-11 NOTE — PROCEDURES
[facility]  Operative Report  2020     Quoc Laureano Patient Status:  No patient class for patient encounter    10/21/1965 MRN [id number]   Location [address] [place] [address], [address] [place] [address], [address] [place] [address] Attending No att. providers found   [address] Day

## 2020-01-21 RX ORDER — METHOCARBAMOL 750 MG/1
TABLET, FILM COATED ORAL
Qty: 540 TABLET | Refills: 0 | Status: SHIPPED | OUTPATIENT
Start: 2020-01-21 | End: 2020-04-20

## 2020-01-21 NOTE — TELEPHONE ENCOUNTER
Medication request: Methocarbamol, #540, no refills  Take 2 tablet by mouth q8hr PRN.     [place]: 10/24/19    LOV: 9/30/19  NOV: 3/30/20

## 2020-02-03 ENCOUNTER — TELEPHONE (OUTPATIENT)
Dept: PAIN CLINIC | Facility: CLINIC | Age: 55
End: 2020-02-03

## 2020-02-03 DIAGNOSIS — M96.1 FAILED BACK SYNDROME OF LUMBAR SPINE: ICD-10-CM

## 2020-02-03 NOTE — TELEPHONE ENCOUNTER
ITP REFILL due by 3/26/2020. Please place order for ITP Rx. Last medication ordered:  morphINE Sulfate (PF) 400 mg in Sodium Chloride 40 mL IT infusion    OV SCHEDULED FOR 3/18/2020 @ 1300.

## 2020-03-06 NOTE — TELEPHONE ENCOUNTER
ITP PUMP Medication received. Verified by Delia Ocampo and Bryanna Robbins RN. Dose correct and medication stored in lock box.     Rx #: [id number]  LOT #: [email or web address]  Expiration date:   4/4/2020

## 2020-03-06 NOTE — TELEPHONE ENCOUNTER
ITP PUMP Medication received.   Verified by Rao Ibarra and Shayla Becerra RN   Dose correct and medication stored in lock box.     Rx #: [id number]  LOT #: [email or web address]  Expiration date:   4/4/2020

## 2020-03-18 ENCOUNTER — OFFICE VISIT (OUTPATIENT)
Dept: PAIN CLINIC | Facility: CLINIC | Age: 55
End: 2020-03-18
Payer: MEDICARE

## 2020-03-18 VITALS
HEART RATE: 84 BPM | BODY MASS INDEX: 18.4 KG/M2 | OXYGEN SATURATION: 97 % | SYSTOLIC BLOOD PRESSURE: 100 MMHG | HEIGHT: 62 IN | DIASTOLIC BLOOD PRESSURE: 50 MMHG | WEIGHT: 100 LBS

## 2020-03-18 DIAGNOSIS — Z97.8 PRESENCE OF INTRATHECAL PUMP: Primary | ICD-10-CM

## 2020-03-18 PROCEDURE — 62370 ANL SP INF PMP W/MDREPRG&FIL: CPT | Performed by: ANESTHESIOLOGY

## 2020-03-18 RX ORDER — LIDOCAINE HYDROCHLORIDE 10 MG/ML
2 INJECTION, SOLUTION INFILTRATION; PERINEURAL ONCE
Status: COMPLETED | OUTPATIENT
Start: 2020-03-18 | End: 2020-03-18

## 2020-03-18 NOTE — PROGRESS NOTES
Timeout completed prior to procedure @ 7756. Participants present for timeout:  Dr. Yina Grady, and patient.       Expected Waste Amt: 9 ml    Actual Waste Amt: 11.7 ml

## 2020-03-18 NOTE — PROCEDURES
[facility]  Operative Report  3/18/2020     Kyrie Ferrari Patient Status:  No patient class for patient encounter    10/21/1965 MRN [id number]   Location ED [facility], [address], [address] Attending No att. providers found   [facility]

## 2020-03-18 NOTE — PROGRESS NOTES
Patient presents in office today with reported pain in lower back     Current pain level reported = 4/10    Last reported dose of HYDROcodone-acetaminophen  MG Oral Tab today in the AM

## 2020-03-23 ENCOUNTER — PATIENT MESSAGE (OUTPATIENT)
Dept: NEUROLOGY | Facility: CLINIC | Age: 55
End: 2020-03-23

## 2020-03-23 ENCOUNTER — TELEPHONE (OUTPATIENT)
Dept: NEUROLOGY | Facility: CLINIC | Age: 55
End: 2020-03-23

## 2020-03-23 NOTE — TELEPHONE ENCOUNTER
S/w pt who states she has not had medication e-scribed before, has an appt on 3/30/20 and would like to cancel at this time but is concerned about getting her 3 months of scripts.  Informed pt she can call us about 1.5 weeks prior to running out of her medi

## 2020-03-23 NOTE — TELEPHONE ENCOUNTER
From: Elizabeth Blevins  To: Jona Araiza MD  Sent: 3/23/2020 12:42 PM CDT  Subject: Non-Urgent Medical Question    Jeffry Reese, I have a appointment on 3/20, for my routine visit, with this virus going around, should I just come pickup my prescription for

## 2020-03-25 ENCOUNTER — TELEPHONE (OUTPATIENT)
Dept: PAIN CLINIC | Facility: CLINIC | Age: 55
End: 2020-03-25

## 2020-03-30 RX ORDER — HYDROCODONE BITARTRATE AND ACETAMINOPHEN 10; 325 MG/1; MG/1
1 TABLET ORAL EVERY 6 HOURS PRN
Qty: 120 TABLET | Refills: 0 | Status: SHIPPED | OUTPATIENT
Start: 2020-04-07 | End: 2020-05-04

## 2020-03-30 NOTE — TELEPHONE ENCOUNTER
Medication request: HYDROcodone-acetaminophen 10-325mg Oral Tab, #120, no refills  Take 1 tablet by mouth Q6hr prn pain    [place]: 3/8/20    LOV: 9/30/19  NOV: Rescheduled d/t CoVid-19

## 2020-04-20 RX ORDER — METHOCARBAMOL 750 MG/1
TABLET, FILM COATED ORAL
Qty: 540 TABLET | Refills: 0 | Status: SHIPPED | OUTPATIENT
Start: 2020-04-20 | End: 2020-07-21

## 2020-04-20 NOTE — TELEPHONE ENCOUNTER
Medication request: Methocarbamol 750mg Oral tab, #540, no refills  Take 2 tablet by mouth TID PRN pain    [place]: 1/21/20    LOV: 9/30/19

## 2020-05-04 RX ORDER — HYDROCODONE BITARTRATE AND ACETAMINOPHEN 10; 325 MG/1; MG/1
1 TABLET ORAL EVERY 6 HOURS PRN
Qty: 120 TABLET | Refills: 0 | Status: SHIPPED | OUTPATIENT
Start: 2020-05-04 | End: 2020-06-03

## 2020-05-04 NOTE — TELEPHONE ENCOUNTER
Medication request: Norco 10-325mg Oral Tab, #120, no refills  Take 1 tablet by mouth q6hr PRN pain    [place] ref[place]: 4/1/20    LOV: 9/30/19

## 2020-05-27 ENCOUNTER — OFFICE VISIT (OUTPATIENT)
Dept: PAIN CLINIC | Facility: CLINIC | Age: 55
End: 2020-05-27
Payer: MEDICARE

## 2020-05-27 DIAGNOSIS — Z97.8 PRESENCE OF INTRATHECAL PUMP: Primary | ICD-10-CM

## 2020-05-27 PROCEDURE — 62370 ANL SP INF PMP W/MDREPRG&FIL: CPT | Performed by: ANESTHESIOLOGY

## 2020-05-27 RX ORDER — LIDOCAINE HYDROCHLORIDE 10 MG/ML
2 INJECTION, SOLUTION INFILTRATION; PERINEURAL ONCE
Status: COMPLETED | OUTPATIENT
Start: 2020-05-27 | End: 2020-05-27

## 2020-05-27 NOTE — PROGRESS NOTES
Timeout completed prior to procedure @ 3810. Participants present for timeout:  Dr. Thelma River, and patient.     Expected Waste Amt: 9.2 ml    Actual Waste Amt: 12.1 ml

## 2020-05-27 NOTE — TELEPHONE ENCOUNTER
ITP PUMP Medication received. Verified by Shauna De La Rosa and Armando Benavidez RN. Dose correct and medication stored in lock box.     Rx #: [id number]  LOT #: [email or web address]  Expiration date:   6/20/2020

## 2020-05-27 NOTE — TELEPHONE ENCOUNTER
ITP PUMP Medication received. Verified by Rohan Elder  and Misael Burden RN . Dose correct and medication stored in lock box.     Rx #: [id number]  LOT #: [email or web address]  Expiration date: 06/20/20

## 2020-05-28 NOTE — PROGRESS NOTES
[facility]  Operative Report  2020     Ananda Costellobell Patient Status:  No patient class for patient encounter    10/21/1965 MRN [id number]   Location ED [facility], [address], [address] [facility] Attending No att. providers found   [facility]

## 2020-06-02 ENCOUNTER — PATIENT MESSAGE (OUTPATIENT)
Dept: NEUROLOGY | Facility: CLINIC | Age: 55
End: 2020-06-02

## 2020-06-03 RX ORDER — HYDROCODONE BITARTRATE AND ACETAMINOPHEN 10; 325 MG/1; MG/1
1 TABLET ORAL EVERY 6 HOURS PRN
Qty: 120 TABLET | Refills: 0 | Status: SHIPPED | OUTPATIENT
Start: 2020-06-04 | End: 2020-06-30

## 2020-06-03 NOTE — TELEPHONE ENCOUNTER
From: Sergei Miller  To: Bryant Irvin MD  Sent: 6/2/2020 7:13 PM CDT  Subject: Prescription Question    Hi it's Leory Big, can I get my hydrocodone ([place]fred) called into [facility]. Thank you [phone]?

## 2020-06-03 NOTE — TELEPHONE ENCOUNTER
Medication request: Norco 10-325mg Oral Tab, #120, no refills  Take 1 tablet by mouth q6hr PRN pain    [place]: 5/5/20    LOV: 9/30/19

## 2020-06-04 ENCOUNTER — TELEPHONE (OUTPATIENT)
Dept: NEUROLOGY | Facility: CLINIC | Age: 55
End: 2020-06-04

## 2020-06-30 NOTE — TELEPHONE ENCOUNTER
Medication request: Norco 10/325mg Oral Tab, #120, no refills  Take 1 tablet by mouth q6hr PRN pain    [place]: 6/4/20    LOV: 9/30/19  NOV: Routed to front office to schedule/pt contacted to schedule f/u via [employer]

## 2020-07-01 ENCOUNTER — PATIENT MESSAGE (OUTPATIENT)
Dept: NEUROLOGY | Facility: CLINIC | Age: 55
End: 2020-07-01

## 2020-07-01 RX ORDER — HYDROCODONE BITARTRATE AND ACETAMINOPHEN 10; 325 MG/1; MG/1
1 TABLET ORAL EVERY 6 HOURS PRN
Qty: 120 TABLET | Refills: 0 | Status: SHIPPED | OUTPATIENT
Start: 2020-07-04 | End: 2020-07-02

## 2020-07-02 RX ORDER — HYDROCODONE BITARTRATE AND ACETAMINOPHEN 10; 325 MG/1; MG/1
1 TABLET ORAL EVERY 6 HOURS PRN
Qty: 120 TABLET | Refills: 0 | Status: SHIPPED | OUTPATIENT
Start: 2020-07-04 | End: 2020-07-30

## 2020-07-02 NOTE — TELEPHONE ENCOUNTER
Prescription sent to incorrect pharmacy - called to cancel prescription sent to [facility] (s/w Aura Bahena at [facility]). Routing to Dr. Brandin Weston to resend to correct pharmacy [facility]). Pt informed of the above via [employer].

## 2020-07-02 NOTE — TELEPHONE ENCOUNTER
From: Benny House  To: Aleta Park MD  Sent: 7/1/2020 10:25 PM CDT  Subject: Prescription Question    Tu Genovevanini, could you please send my prescription to [facility] [phone], is the phone number, my [place] has never gone to [facility], t

## 2020-07-13 ENCOUNTER — TELEPHONE (OUTPATIENT)
Dept: NEUROLOGY | Facility: CLINIC | Age: 55
End: 2020-07-13

## 2020-07-13 DIAGNOSIS — M96.1 FAILED BACK SYNDROME OF LUMBAR SPINE: ICD-10-CM

## 2020-07-13 NOTE — TELEPHONE ENCOUNTER
ITP REFILL due by 8/13/2020. Please place order for ITP Rx.   Last medication ordered:  morphINE Sulfate (PF) 400 mg in Sodium Chloride 40 mL IT infusion    OV scheduled 8/6/2020 @ 1:00 PM.

## 2020-07-20 NOTE — TELEPHONE ENCOUNTER
Refill request for methocarbamol 750 mg, take 2 tabs every 8 hrs as needed, #540,no refills    LOV: 9/30/19  NOV: 9/21/20  Last refilled on 4/20

## 2020-07-21 RX ORDER — METHOCARBAMOL 750 MG/1
TABLET, FILM COATED ORAL
Qty: 540 TABLET | Refills: 0 | Status: SHIPPED | OUTPATIENT
Start: 2020-07-21 | End: 2020-10-08

## 2020-07-30 ENCOUNTER — PATIENT MESSAGE (OUTPATIENT)
Dept: NEUROLOGY | Facility: CLINIC | Age: 55
End: 2020-07-30

## 2020-07-30 RX ORDER — HYDROCODONE BITARTRATE AND ACETAMINOPHEN 10; 325 MG/1; MG/1
1 TABLET ORAL EVERY 6 HOURS PRN
Qty: 120 TABLET | Refills: 0 | Status: SHIPPED | OUTPATIENT
Start: 2020-08-03 | End: 2020-08-31

## 2020-07-30 NOTE — TELEPHONE ENCOUNTER
From: Celia Garza  To: Suzanne Bansal MD  Sent: 7/30/2020 1:11 PM CDT  Subject: Prescription Question    Could you please refill my [place] prescription at [facility] for August, thank you, Kasey Rabago

## 2020-08-05 NOTE — TELEPHONE ENCOUNTER
ITP PUMP Medication received. Verified by Moisés Escalante and Kole Nguyen RN. Dose correct and medication stored in lock box.     Rx #: [id number]  LOT #: [email or web address]  Expiration date: 9/3/2020

## 2020-08-05 NOTE — TELEPHONE ENCOUNTER
ITP PUMP Medication received. Verified by Stephen Reyna and Dina Quiroz RN. Dose correct and medication stored in lock box.     Rx #: [id number]  LOT #: [email or web address]  Expiration date:   9/3/2020

## 2020-08-06 ENCOUNTER — OFFICE VISIT (OUTPATIENT)
Dept: PAIN CLINIC | Facility: CLINIC | Age: 55
End: 2020-08-06
Payer: MEDICARE

## 2020-08-06 VITALS — WEIGHT: 100 LBS | HEIGHT: 64 IN | BODY MASS INDEX: 17.07 KG/M2

## 2020-08-06 DIAGNOSIS — Z97.8 PRESENCE OF INTRATHECAL PUMP: Primary | ICD-10-CM

## 2020-08-06 PROCEDURE — 62370 ANL SP INF PMP W/MDREPRG&FIL: CPT | Performed by: ANESTHESIOLOGY

## 2020-08-06 RX ORDER — ACYCLOVIR 50 MG/G
OINTMENT TOPICAL
COMMUNITY

## 2020-08-06 RX ORDER — CHLORHEXIDINE GLUCONATE 0.12 MG/ML
RINSE ORAL
COMMUNITY
End: 2020-12-19

## 2020-08-06 RX ORDER — LIDOCAINE HYDROCHLORIDE 10 MG/ML
2 INJECTION, SOLUTION INFILTRATION; PERINEURAL ONCE
Status: COMPLETED | OUTPATIENT
Start: 2020-08-06 | End: 2020-08-06

## 2020-08-06 RX ORDER — VALACYCLOVIR HYDROCHLORIDE 1 G/1
TABLET, FILM COATED ORAL
COMMUNITY
End: 2021-10-14

## 2020-08-06 RX ORDER — SULFAMETHOXAZOLE AND TRIMETHOPRIM 800; 160 MG/1; MG/1
TABLET ORAL
COMMUNITY
End: 2020-09-21

## 2020-08-06 NOTE — PROGRESS NOTES
Pt reports that current settings are working. No changes necessary today. Expected Waste Amt: 9 ml    Actual Waste Amt: 13 ml    Timeout completed prior to procedure @ 3728. Participants present for timeout:  Dr. Thelma River, and patient.

## 2020-08-06 NOTE — PROGRESS NOTES
Patient presents in office today with reported pain in lower back, down left leg, and left foot    Current pain level reported = 3/10    Last reported dose of HYDROcodone-acetaminophen  MG Oral Tab patient states this morning day of OV

## 2020-08-10 NOTE — PROCEDURES
[facility]  Operative Report  2020     Tim Ugalde Patient Status:  No patient class for patient encounter    10/21/1965 MRN [id number]   Location [address] [place] [address], [address] [place] [address] Drive, [address] [place] [address] Attending No att. providers found   [facility] Day

## 2020-08-30 ENCOUNTER — PATIENT MESSAGE (OUTPATIENT)
Dept: NEUROLOGY | Facility: CLINIC | Age: 55
End: 2020-08-30

## 2020-08-31 NOTE — TELEPHONE ENCOUNTER
Refill request for norco 10/325 mg, take 1 tab every 6 hrs as needed, #120, no refills    LOV: 9/30/19  NOV: 9/21/20  Last refilled on 8/4/20 per pharmacy

## 2020-08-31 NOTE — TELEPHONE ENCOUNTER
From: Benny House  To: Aleta Park MD  Sent: 8/30/2020 4:30 PM CDT  Subject: Prescription Question    Hi guys, it's Terrye Sensing, could you phone in my prescription for [place] to [address] [place] [address] Rd for my September refill.  Thank you Mane Chowdhury

## 2020-09-01 RX ORDER — HYDROCODONE BITARTRATE AND ACETAMINOPHEN 10; 325 MG/1; MG/1
1 TABLET ORAL EVERY 6 HOURS PRN
Qty: 120 TABLET | Refills: 0 | Status: SHIPPED | OUTPATIENT
Start: 2020-09-03 | End: 2020-09-03

## 2020-09-02 ENCOUNTER — TELEPHONE (OUTPATIENT)
Dept: PAIN CLINIC | Facility: CLINIC | Age: 55
End: 2020-09-02

## 2020-09-02 DIAGNOSIS — M96.1 FAILED BACK SYNDROME OF LUMBAR SPINE: ICD-10-CM

## 2020-09-02 NOTE — TELEPHONE ENCOUNTER
ITP REFILL due by 10/23/2020. Please place order for ITP Rx. Last medication ordered:  morphINE Sulfate (PF) 400 mg in Sodium Chloride 40 mL IT infusion    OV scheduled 10/20/2020 @ 1:30.

## 2020-09-03 RX ORDER — HYDROCODONE BITARTRATE AND ACETAMINOPHEN 10; 325 MG/1; MG/1
1 TABLET ORAL EVERY 6 HOURS PRN
Qty: 120 TABLET | Refills: 0 | Status: SHIPPED | OUTPATIENT
Start: 2020-09-03 | End: 2020-09-30

## 2020-09-03 NOTE — TELEPHONE ENCOUNTER
Spoke to patient who states [place] Olvin was sent to [facility] [employer] order but pharmacy when it should have been sent to [facility]. Prescription for [place] Olvin is to start today and patient would like this corrected asap.     Contacted Trudy from c

## 2020-09-21 ENCOUNTER — OFFICE VISIT (OUTPATIENT)
Dept: NEUROLOGY | Facility: CLINIC | Age: 55
End: 2020-09-21
Payer: MEDICARE

## 2020-09-21 DIAGNOSIS — M48.061 LUMBAR FORAMINAL STENOSIS: ICD-10-CM

## 2020-09-21 DIAGNOSIS — M25.461 EFFUSION OF RIGHT KNEE JOINT: ICD-10-CM

## 2020-09-21 DIAGNOSIS — M54.42 CHRONIC LEFT-SIDED LOW BACK PAIN WITH LEFT-SIDED SCIATICA: ICD-10-CM

## 2020-09-21 DIAGNOSIS — G89.29 CHRONIC LEFT-SIDED LOW BACK PAIN WITH LEFT-SIDED SCIATICA: ICD-10-CM

## 2020-09-21 DIAGNOSIS — M51.26 LUMBAR HERNIATED DISC: ICD-10-CM

## 2020-09-21 DIAGNOSIS — M51.9 LUMBAR DISC DISEASE: ICD-10-CM

## 2020-09-21 DIAGNOSIS — M96.1 LUMBAR POST-LAMINECTOMY SYNDROME: ICD-10-CM

## 2020-09-21 DIAGNOSIS — M96.1 FAILED BACK SYNDROME OF LUMBAR SPINE: ICD-10-CM

## 2020-09-21 DIAGNOSIS — M54.16 LUMBAR RADICULOPATHY: Primary | ICD-10-CM

## 2020-09-21 PROCEDURE — 99214 OFFICE O/P EST MOD 30 MIN: CPT | Performed by: PHYSICAL MEDICINE & REHABILITATION

## 2020-09-21 NOTE — PROGRESS NOTES
Low Back Pain H & P    Chief Complaint: Patient presents with:  Low Back Pain: LOV: 9/30/19 - Pt presents for LBP, consistent w/ LOV Low back and Left hip pain that increases w/ weather changes. Pt requests disability card form be renewed.  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 \"   • [place]    per NextGen:  \"low back surgeries x's 8\"   • OTHER SURGICAL HISTORY  2010    per NextGen:  \"left shoulder surgery x's 2\"   • OTHER SURGIC Sexual Activity      Alcohol use: No        Alcohol/week: 0.0 standard drinks      Drug use: Never      Sexual activity: Not Currently    Lifestyle      Physical activity        Days per week: Not on file        Minutes per session: Not on file      Stress redness or discharge bilaterally. Skin:  There are no rashes or lesions. Surgical scars are healed. Vitals: There were no vitals filed for this visit.     Gait:    Gait: Normal gait   Sit to Stand: no difficulty      Lumbar Spine:    Scoliosis: No sc right knee or low back pain or left leg pain. She will follow up in 6 months or sooner if needed. The patient understands and agrees with the stated plan. Carolynn Foreman MD  9/21/2020

## 2020-09-21 NOTE — PATIENT INSTRUCTIONS
Plan  The patient will continue with their current pain medications. The patient will continue with her home exercise program.    She would like to hold on getting a right knee x-ray for now.      She will call me if she has any worsening of her right

## 2020-09-30 ENCOUNTER — PATIENT MESSAGE (OUTPATIENT)
Dept: NEUROLOGY | Facility: CLINIC | Age: 55
End: 2020-09-30

## 2020-10-01 RX ORDER — HYDROCODONE BITARTRATE AND ACETAMINOPHEN 10; 325 MG/1; MG/1
1 TABLET ORAL EVERY 6 HOURS PRN
Qty: 120 TABLET | Refills: 0 | Status: SHIPPED | OUTPATIENT
Start: 2020-10-03 | End: 2020-10-30

## 2020-10-06 NOTE — TELEPHONE ENCOUNTER
Refill request for methocarbamol 750 mg, take 2 tabs every 8 hrs as needed, #540, no refills    LOV: 9/21/20  NOV: none  Last refilled on 7/21/20

## 2020-10-08 RX ORDER — METHOCARBAMOL 750 MG/1
TABLET, FILM COATED ORAL
Qty: 540 TABLET | Refills: 0 | Status: SHIPPED | OUTPATIENT
Start: 2020-10-08 | End: 2021-01-06

## 2020-10-16 NOTE — TELEPHONE ENCOUNTER
ITP PUMP Medication received. Verified by BODØ RN and  .  Dose correct and medication stored in lock box.     Rx #: [id number]  LOT #: [email or web address]  Expiration date:   11/14/2020

## 2020-10-20 ENCOUNTER — OFFICE VISIT (OUTPATIENT)
Dept: PAIN CLINIC | Facility: CLINIC | Age: 55
End: 2020-10-20
Payer: MEDICARE

## 2020-10-20 VITALS
OXYGEN SATURATION: 98 % | DIASTOLIC BLOOD PRESSURE: 62 MMHG | HEIGHT: 64 IN | BODY MASS INDEX: 17.07 KG/M2 | WEIGHT: 100 LBS | HEART RATE: 86 BPM | SYSTOLIC BLOOD PRESSURE: 106 MMHG

## 2020-10-20 DIAGNOSIS — Z97.8 PRESENCE OF INTRATHECAL PUMP: Primary | ICD-10-CM

## 2020-10-20 PROCEDURE — 62370 ANL SP INF PMP W/MDREPRG&FIL: CPT | Performed by: ANESTHESIOLOGY

## 2020-10-20 RX ORDER — LIDOCAINE HYDROCHLORIDE 10 MG/ML
1 INJECTION, SOLUTION INFILTRATION; PERINEURAL ONCE
Status: COMPLETED | OUTPATIENT
Start: 2020-10-20 | End: 2020-10-20

## 2020-10-20 NOTE — PROGRESS NOTES
Patient presents in office today with reported pain in lower back, left leg and into the foot    Current pain level reported = 4/10    Last reported dose of HYDROcodone-acetaminophen  MG Oral Tab pt states in the AM day of OV

## 2020-10-20 NOTE — TELEPHONE ENCOUNTER
ITP PUMP Medication verified by Ander Ball and Shantal HUBER  .   Dose correct and medication stored in lock box.     Rx #: [id number]  LOT #: [email or web address]  Expiration date:   11/14/2020

## 2020-10-20 NOTE — PROGRESS NOTES
Timeout completed prior to procedure @ 2628. Participants present for timeout:  Dr. Meghana Lopez, and patient.     Residual volume per [employer] - 7.1 mL    Wasted- 10.5 mL

## 2020-10-22 NOTE — PROCEDURES
[facility]  Operative Report  10/20/2020     Sherrell Romulo Patient Status:  No patient class for patient encounter    10/21/1965 MRN [id number]   Location [address] [place] [address], [address] [place] [address], [address] [place] [address] Attending No att. providers found   [facility]

## 2020-10-29 ENCOUNTER — PATIENT MESSAGE (OUTPATIENT)
Dept: NEUROLOGY | Facility: CLINIC | Age: 55
End: 2020-10-29

## 2020-10-30 RX ORDER — HYDROCODONE BITARTRATE AND ACETAMINOPHEN 10; 325 MG/1; MG/1
1 TABLET ORAL EVERY 6 HOURS PRN
Qty: 120 TABLET | Refills: 0 | Status: SHIPPED | OUTPATIENT
Start: 2020-10-31 | End: 2020-12-02

## 2020-10-30 NOTE — TELEPHONE ENCOUNTER
From: Epi Toure  To: Noah Weston MD  Sent: 10/29/2020 6:40 PM CDT  Subject: Prescription Question    Dwaine Pearl , can you phone in my prescription for [place] to [facility] for November.  Thank you kindly, Marcy Singh

## 2020-10-30 NOTE — TELEPHONE ENCOUNTER
Norco 10/325mg Take 1 tablet every 6 hours prn pain. #120. NO refills    [place]    LOV-9/21/20  NOV-to follow up in 6 months    jessat messaged patient to inform her to schedule a nov.

## 2020-11-09 ENCOUNTER — TELEPHONE (OUTPATIENT)
Dept: PAIN CLINIC | Facility: CLINIC | Age: 55
End: 2020-11-09

## 2020-11-09 DIAGNOSIS — M96.1 FAILED BACK SYNDROME OF LUMBAR SPINE: ICD-10-CM

## 2020-11-09 NOTE — TELEPHONE ENCOUNTER
ITP REFILL due by 1/1/2021    Please place order for ITP Rx.   Last medication ordered: morphINE Sulfate (PF) 400 mg in Sodium Chloride 40 mL IT infusion    OV scheduled 12/22/20

## 2020-11-26 ENCOUNTER — PATIENT MESSAGE (OUTPATIENT)
Dept: NEUROLOGY | Facility: CLINIC | Age: 55
End: 2020-11-26

## 2020-11-30 NOTE — TELEPHONE ENCOUNTER
From: Sasha Wheat  To: Gianna Flynn MD  Sent: 11/26/2020 3:01 PM CST  Subject: Prescription Question    Hi Everyone, Happy Thanksgiving, hope you all are well.   Can Dr Sarah Flynn fill my prescription for [place] at [facility] for the month o

## 2020-11-30 NOTE — TELEPHONE ENCOUNTER
Refill request for norco 10/325 mg, take 1 tab every 6 hrs as needed, #120, no refills    LOV: 9/21/20  NOV: 4/1/21  Last [place]

## 2020-12-02 RX ORDER — HYDROCODONE BITARTRATE AND ACETAMINOPHEN 10; 325 MG/1; MG/1
1 TABLET ORAL EVERY 6 HOURS PRN
Qty: 120 TABLET | Refills: 0 | Status: SHIPPED | OUTPATIENT
Start: 2020-12-02 | End: 2020-12-30

## 2020-12-18 NOTE — TELEPHONE ENCOUNTER
ITP PUMP Medication received. Verified by Vincent Barth and Reta Bumpers RN.   Dose correct and medication stored in lock box.     Rx #: [id number]  LOT #: [email or web address]  Expiration date:   1/16/2021

## 2020-12-18 NOTE — TELEPHONE ENCOUNTER
ITP PUMP Medication received. Verified by Delia Ocampo and Bryanna Robbins RN. Dose correct and medication stored in lock box.     Rx #: [id number]  LOT #: [email or web address]  Expiration date:   1/16/2021

## 2020-12-22 ENCOUNTER — OFFICE VISIT (OUTPATIENT)
Dept: PAIN CLINIC | Facility: CLINIC | Age: 55
End: 2020-12-22
Payer: MEDICARE

## 2020-12-22 VITALS
WEIGHT: 100 LBS | BODY MASS INDEX: 18.4 KG/M2 | RESPIRATION RATE: 16 BRPM | DIASTOLIC BLOOD PRESSURE: 64 MMHG | HEIGHT: 62 IN | HEART RATE: 80 BPM | SYSTOLIC BLOOD PRESSURE: 118 MMHG | OXYGEN SATURATION: 94 %

## 2020-12-22 DIAGNOSIS — Z97.8 PRESENCE OF INTRATHECAL PUMP: Primary | ICD-10-CM

## 2020-12-22 PROCEDURE — 62370 ANL SP INF PMP W/MDREPRG&FIL: CPT | Performed by: ANESTHESIOLOGY

## 2020-12-22 RX ORDER — LIDOCAINE HYDROCHLORIDE 10 MG/ML
2 INJECTION, SOLUTION INFILTRATION; PERINEURAL ONCE
Status: COMPLETED | OUTPATIENT
Start: 2020-12-22 | End: 2020-12-22

## 2020-12-22 NOTE — PROGRESS NOTES
Timeout completed prior to procedure @ 8492. Participants present for timeout:  Dr. Alexsandra Lazo, and patient. Expected Waste Amt: 8.8 ml    Actual Waste Amt: 12 ml    Discrepancy d/t less bolus use than allotted.

## 2020-12-22 NOTE — PROGRESS NOTES
Patient presents in office today with reported pain in lower back    Current pain level reported = 4/10    Last reported dose of HYDROcodone-acetaminophen  MG Oral Tab,  METHOCARBAMOL 750 MG Oral Tab, Patient stated this morning prior to OV

## 2020-12-28 NOTE — PROCEDURES
[facility]  Operative Report  2020     José Luis Basil Patient Status:  No patient class for patient encounter    10/21/1965 MRN [id number]   Location [facility], [address] [place] [address], [facility] Attending No att. providers found   [facility]

## 2020-12-29 RX ORDER — HYDROCODONE BITARTRATE AND ACETAMINOPHEN 10; 325 MG/1; MG/1
1 TABLET ORAL EVERY 6 HOURS PRN
Qty: 120 TABLET | Refills: 0 | OUTPATIENT
Start: 2021-01-01 | End: 2021-01-31

## 2020-12-29 NOTE — TELEPHONE ENCOUNTER
Refill request for norco 10/325 mg, take 1 tab every 6 hrs as needed, #120, no refills    LOV: 9/21/20  NOV: 4/1/21  Last [place]

## 2020-12-29 NOTE — TELEPHONE ENCOUNTER
As per Dr. Ashli Gonzalez last note, patient needs appointment. She had not been seen since 9/21/20 and recently had her morphine pump refilled by Dr. Jesús Chicas.

## 2020-12-29 NOTE — TELEPHONE ENCOUNTER
Refill request for Norco 10/325 mg, take 1 tab every 6 hrs as needed, #120, no refills. To be filled at [address] [facility] [address] [facility] 12/02/20   LOV 09/21/20  NOV 04/01/21  Will inform Nursing.

## 2020-12-30 RX ORDER — HYDROCODONE BITARTRATE AND ACETAMINOPHEN 10; 325 MG/1; MG/1
1 TABLET ORAL EVERY 6 HOURS PRN
Qty: 120 TABLET | Refills: 0 | Status: SHIPPED | OUTPATIENT
Start: 2021-01-02 | End: 2021-01-28

## 2020-12-30 NOTE — TELEPHONE ENCOUNTER
Refill request for norco 10/325 mg, take 1 tab every 6 hrs as needed, #120, no refills     LOV: 9/21/20  NOV: 4/1/21  Last [place]       S/W patient and she is asking if her Noroc can please be refilled.  She know she has a follow up a

## 2021-01-05 ENCOUNTER — TELEPHONE (OUTPATIENT)
Dept: PAIN CLINIC | Facility: CLINIC | Age: 56
End: 2021-01-05

## 2021-01-05 DIAGNOSIS — M96.1 FAILED BACK SYNDROME OF LUMBAR SPINE: ICD-10-CM

## 2021-01-05 NOTE — TELEPHONE ENCOUNTER
ITP REFILL due by 3/5/21. Please place order for ITP Rx.   Last medication ordered:  morphINE Sulfate (PF) 400 mg in Sodium Chloride 40 mL IT infusion     OV scheduled 3/2/21 @ 1:00 PM.

## 2021-01-05 NOTE — TELEPHONE ENCOUNTER
Methocarbamol 750mg Take 2 tab every 8 hours. #540.     Last filled-10/8/20    LOV-9/21/20  NOV-4/1/2020

## 2021-01-06 RX ORDER — METHOCARBAMOL 750 MG/1
TABLET, FILM COATED ORAL
Qty: 540 TABLET | Refills: 0 | Status: SHIPPED | OUTPATIENT
Start: 2021-01-06 | End: 2021-03-31

## 2021-01-27 ENCOUNTER — PATIENT MESSAGE (OUTPATIENT)
Dept: NEUROLOGY | Facility: CLINIC | Age: 56
End: 2021-01-27

## 2021-01-28 NOTE — TELEPHONE ENCOUNTER
From: Kacey Simon  To: Jaycee Patterson MD  Sent: 1/27/2021 5:00 PM CST  Subject: Prescription Question    I everyone, hope your doing good. Can Dr. Ed Patterson refill my February prescription for [place] at [facility].  Thank you Chantel Mahoney

## 2021-01-28 NOTE — TELEPHONE ENCOUNTER
Norco 10/325mg Take 1 tablet every 6 hours prn pain. #120.     [place]    LOV-9/21/20  NOV-4/1/21

## 2021-01-30 RX ORDER — HYDROCODONE BITARTRATE AND ACETAMINOPHEN 10; 325 MG/1; MG/1
1 TABLET ORAL EVERY 6 HOURS PRN
Qty: 120 TABLET | Refills: 0 | Status: SHIPPED | OUTPATIENT
Start: 2021-02-01 | End: 2021-02-22

## 2021-02-22 NOTE — TELEPHONE ENCOUNTER
Medication request:Hydrocodone-acetaminophen 10-325mg Oral Tab. Take 1 tab PO Q6h PRN for pain.      LOV: 09/21/2021  NOV: 04/01/2021    [place] 02/01/2021 #120 Refill-0

## 2021-02-23 RX ORDER — HYDROCODONE BITARTRATE AND ACETAMINOPHEN 10; 325 MG/1; MG/1
1 TABLET ORAL EVERY 6 HOURS PRN
Qty: 120 TABLET | Refills: 0 | Status: SHIPPED | OUTPATIENT
Start: 2021-03-03 | End: 2021-03-03

## 2021-02-26 NOTE — TELEPHONE ENCOUNTER
ITP PUMP Medication received. Verified by Dami Hernandes and Candy Canchola RN. Dose correct and medication stored in lock box.     Rx #: [id number]  LOT #: [email or web address]  Expiration date:   3/27/2021

## 2021-02-26 NOTE — TELEPHONE ENCOUNTER
ITP PUMP Medication received. Verified by Gerda Alexander and Antoni Lopes RN . Dose correct and medication stored in lock box.     Rx #: [id number]  LOT #: Shan@"[employer]  Expiration date:   03/27/2021

## 2021-03-02 ENCOUNTER — OFFICE VISIT (OUTPATIENT)
Dept: PAIN CLINIC | Facility: CLINIC | Age: 56
End: 2021-03-02
Payer: MEDICARE

## 2021-03-02 VITALS
DIASTOLIC BLOOD PRESSURE: 78 MMHG | WEIGHT: 100 LBS | BODY MASS INDEX: 18 KG/M2 | SYSTOLIC BLOOD PRESSURE: 116 MMHG | RESPIRATION RATE: 16 BRPM | HEART RATE: 76 BPM

## 2021-03-02 DIAGNOSIS — Z97.8 PRESENCE OF INTRATHECAL PUMP: Primary | ICD-10-CM

## 2021-03-02 PROBLEM — F32.A DEPRESSIVE DISORDER: Status: ACTIVE | Noted: 2021-03-02

## 2021-03-02 PROCEDURE — 62370 ANL SP INF PMP W/MDREPRG&FIL: CPT | Performed by: ANESTHESIOLOGY

## 2021-03-02 RX ORDER — LIDOCAINE HYDROCHLORIDE 10 MG/ML
2 INJECTION, SOLUTION INFILTRATION; PERINEURAL ONCE
Status: COMPLETED | OUTPATIENT
Start: 2021-03-02 | End: 2021-03-02

## 2021-03-02 NOTE — PROGRESS NOTES
Patient presents in office today with reported pain in lower back    Current pain level reported = 3/10    Last reported dose of HYDROcodone-acetaminophen  MG Oral Tab, this morning prior to OV,  METHOCARBAMOL 750 MG Oral Tab, this morning prior to O

## 2021-03-02 NOTE — PROGRESS NOTES
Timeout completed prior to procedure @ 9021. Participants present for timeout:  Dr. Ciro Palencia, and patient.         Expected Wste Amt: 5.7 ml    Actual Waste Amt: 10.1 ml    Discrepancy in expected waste amt to actual waste amt; more than 2 ml diff

## 2021-03-03 ENCOUNTER — TELEPHONE (OUTPATIENT)
Dept: PAIN CLINIC | Facility: CLINIC | Age: 56
End: 2021-03-03

## 2021-03-03 DIAGNOSIS — M96.1 FAILED BACK SYNDROME OF LUMBAR SPINE: ICD-10-CM

## 2021-03-03 RX ORDER — HYDROCODONE BITARTRATE AND ACETAMINOPHEN 10; 325 MG/1; MG/1
1 TABLET ORAL EVERY 6 HOURS PRN
Qty: 120 TABLET | Refills: 0 | Status: SHIPPED | OUTPATIENT
Start: 2021-03-03 | End: 2021-04-01

## 2021-03-03 NOTE — TELEPHONE ENCOUNTER
S/W patient and she stated that [facility] did not have the Norco medication come in on their shipment for today's refill  Staff called Sam Lizama to confirm.  [facility] that they do not have current supply of the Noroc fo

## 2021-03-03 NOTE — PROCEDURES
[facility]  Operative Report  3/2/2021     Quintana Shanteprashant Patient Status:  No patient class for patient encounter    10/21/1965 MRN [id number]   Location [facility], [facility] [facility] [address] [address] [facility] [address], Dusty Canchola Attending No att. providers found   [facility]

## 2021-03-03 NOTE — TELEPHONE ENCOUNTER
ITP REFILL due by 5/14/2021    Please place order for ITP Rx.   Last medication ordered:    morphINE Sulfate (PF) 400 mg in Sodium Chloride 40 mL IT infusion    OV scheduled 5/11/2021

## 2021-03-30 NOTE — TELEPHONE ENCOUNTER
Medication request: METHOCARBAMOL 750 MG Oral Tab. TAKE 2 TABLETS EVERY 8     HOURS AS NEEDED.      LOV: 09/21/2021  NOV: 04/01/2021    [place] 01/11/2021 #540 Refill-0

## 2021-03-31 RX ORDER — METHOCARBAMOL 750 MG/1
TABLET, FILM COATED ORAL
Qty: 540 TABLET | Refills: 0 | Status: SHIPPED | OUTPATIENT
Start: 2021-03-31 | End: 2021-07-06

## 2021-04-01 ENCOUNTER — OFFICE VISIT (OUTPATIENT)
Dept: NEUROLOGY | Facility: CLINIC | Age: 56
End: 2021-04-01
Payer: MEDICARE

## 2021-04-01 VITALS — BODY MASS INDEX: 17.07 KG/M2 | HEIGHT: 64 IN | WEIGHT: 100 LBS

## 2021-04-01 DIAGNOSIS — M48.061 LUMBAR FORAMINAL STENOSIS: ICD-10-CM

## 2021-04-01 DIAGNOSIS — M54.16 LUMBAR RADICULOPATHY: Primary | ICD-10-CM

## 2021-04-01 DIAGNOSIS — M51.26 LUMBAR HERNIATED DISC: ICD-10-CM

## 2021-04-01 DIAGNOSIS — M51.9 LUMBAR DISC DISEASE: ICD-10-CM

## 2021-04-01 DIAGNOSIS — G89.4 CHRONIC PAIN SYNDROME: ICD-10-CM

## 2021-04-01 DIAGNOSIS — M96.1 LUMBAR POST-LAMINECTOMY SYNDROME: ICD-10-CM

## 2021-04-01 PROCEDURE — 99213 OFFICE O/P EST LOW 20 MIN: CPT | Performed by: PHYSICAL MEDICINE & REHABILITATION

## 2021-04-01 RX ORDER — HYDROCODONE BITARTRATE AND ACETAMINOPHEN 10; 325 MG/1; MG/1
1 TABLET ORAL EVERY 6 HOURS PRN
Qty: 120 TABLET | Refills: 0 | Status: SHIPPED | OUTPATIENT
Start: 2021-04-02 | End: 2021-04-29

## 2021-04-01 NOTE — PATIENT INSTRUCTIONS
Plan  She will trial weaning down on the Norco.  She will drop by an half a tablet a day for the next month to see if she is able to tolerate a lower dose. Alina suarez call me in one month to let me know how she is doing.     The patient will continue with

## 2021-04-01 NOTE — PROGRESS NOTES
Low Back Pain H & P    Chief Complaint: Patient presents with:  Low Back Pain: LOV: 09/21/2020. Patient still doing home exercises. Patient never got Xray. Patient takes Standard [place] for pain still. Patient rates pain 3/10.  Patient still has N/T doing left leg an MAIN OR   • INTRATHECAL PUMP IMPLANTATION N/A 10/26/2016    Performed by Surya Durbin MD at [facility] MAIN OR   • INTRATHECAL PUMP TRIAL N/A 7/14/2016    Performed by Jazmine Sanabria MD at [facility] MAIN OR   • KNEE SURGERY      right knee   • OTHER      anter Diabetes Other         Family h/o Diabetes mellitus   • Other (Other) Other         Family h   • Other (RA) Maternal Grandmother    • Other (RA) Paternal Grandmother        Social History   Social History    Socioeconomic History      Marital status: Shorty Session:   Stress:       Feeling of Stress :   Social Connections:       Frequency of Communication with Friends and Family:       Frequency of Social Gatherings with Friends and Family:       Attends [employer] Services:       Active Member of Club[employer] or Org plantar reflexes: downward response   LEFT plantar reflexes: downward response   Reflexes: 2+ in bilateral lower extremities     Assessment  1. left > right L5-S1 chronic radiculopathy    2. Chronic pain syndrome    3. s/p left L3-5 laminectomies    4.  L5-

## 2021-04-06 ENCOUNTER — TELEPHONE (OUTPATIENT)
Dept: PAIN CLINIC | Facility: CLINIC | Age: 56
End: 2021-04-06

## 2021-04-06 DIAGNOSIS — M96.1 FAILED BACK SYNDROME OF LUMBAR SPINE: ICD-10-CM

## 2021-04-29 NOTE — TELEPHONE ENCOUNTER
Narcotic Refill Request    Medication request: HYDROcodone-acetaminophen  MG  Take 1 tablet by mouth every 6 (six) hours as needed for Pain.     LOV: 4/1/21  NOV: 10/4/21     [place] 4/1/21 #120    Per LOV 4/1/21: \"She will trial weaning d

## 2021-04-29 NOTE — TELEPHONE ENCOUNTER
Per patient mychart message:  \"I tried in the morning for 2 weeks like dr Arleth Pillai said, and that didn't work , so I have been doing a half less at my afternoon dose which seems to be better, so for now and am a half if dose less for now, thank you\"

## 2021-04-30 RX ORDER — HYDROCODONE BITARTRATE AND ACETAMINOPHEN 10; 325 MG/1; MG/1
1 TABLET ORAL EVERY 6 HOURS PRN
Qty: 120 TABLET | Refills: 0 | Status: SHIPPED | OUTPATIENT
Start: 2021-05-02 | End: 2021-06-01

## 2021-05-03 ENCOUNTER — TELEPHONE (OUTPATIENT)
Dept: PAIN CLINIC | Facility: CLINIC | Age: 56
End: 2021-05-03

## 2021-05-03 NOTE — TELEPHONE ENCOUNTER
Spoke with patient and rescheduled ITP refill to 5/13/2021 at 9:30 AM.     Patient was initially scheduled for ITP refill 5/11/2021. Meds to be shipped to office on 5/7/2021 from the .

## 2021-05-07 NOTE — TELEPHONE ENCOUNTER
ITP PUMP Medication received. Verified by Samir Alaniz and Hailee Patel. Dose correct and medication stored in lock box.     Rx #: [id number]  LOT #: [email or web address]  Expiration date:   6/5/2021

## 2021-05-13 ENCOUNTER — OFFICE VISIT (OUTPATIENT)
Dept: PAIN CLINIC | Facility: CLINIC | Age: 56
End: 2021-05-13
Payer: MEDICARE

## 2021-05-13 VITALS
WEIGHT: 100 LBS | DIASTOLIC BLOOD PRESSURE: 60 MMHG | OXYGEN SATURATION: 98 % | HEART RATE: 77 BPM | SYSTOLIC BLOOD PRESSURE: 90 MMHG | BODY MASS INDEX: 17 KG/M2

## 2021-05-13 DIAGNOSIS — Z97.8 PRESENCE OF INTRATHECAL PUMP: Primary | ICD-10-CM

## 2021-05-13 PROCEDURE — 62370 ANL SP INF PMP W/MDREPRG&FIL: CPT | Performed by: ANESTHESIOLOGY

## 2021-05-13 RX ORDER — LIDOCAINE HYDROCHLORIDE 10 MG/ML
2 INJECTION, SOLUTION INFILTRATION; PERINEURAL ONCE
Status: COMPLETED | OUTPATIENT
Start: 2021-05-13 | End: 2021-05-13

## 2021-05-13 NOTE — PROGRESS NOTES
Timeout completed prior to procedure @ 0185. Participants present for timeout:  Dr. Lew Stiles, RN Kristofer Carballo, and patient.       Expected Waste Amt: 5.2 ml    Actual Waste Amt:  9.2 ml      Boluses reduced from 8 to 6 per day to avoid waste discrepancy in

## 2021-05-17 NOTE — PROCEDURES
[facility]  Operative Report  2021     Skye Rafaelaroula Patient Status:  No patient class for patient encounter    10/21/1965 MRN [id number]   Location [address] [place] [address], [address] [place] [address], [address] [place] [address] Attending No att. providers found   [facility]

## 2021-06-01 ENCOUNTER — TELEPHONE (OUTPATIENT)
Dept: PAIN CLINIC | Facility: CLINIC | Age: 56
End: 2021-06-01

## 2021-06-01 DIAGNOSIS — M96.1 FAILED BACK SYNDROME OF LUMBAR SPINE: ICD-10-CM

## 2021-06-01 NOTE — TELEPHONE ENCOUNTER
ITP REFILL due by 7/31/2021. Please place order for ITP Rx. Last medication ordered:  morphINE Sulfate (PF) 400 mg in Sodium Chloride 40 mL IT infusion    OV scheduled 7/27/2021 @ 1300.

## 2021-06-29 RX ORDER — HYDROCODONE BITARTRATE AND ACETAMINOPHEN 10; 325 MG/1; MG/1
1 TABLET ORAL EVERY 6 HOURS PRN
Qty: 120 TABLET | Refills: 0 | Status: SHIPPED | OUTPATIENT
Start: 2021-07-03 | End: 2021-08-05

## 2021-06-29 NOTE — TELEPHONE ENCOUNTER
Medication request: HYDROcodone-acetaminophen  MG Oral Tab.   Take 1 tablet by mouth every 6 (six) hours as needed for Pain.     LOV: 04/01/2021  NOV:  10/04/2021    [place] ref[place]: 06/03/2021 #30 Refill-0

## 2021-07-06 RX ORDER — METHOCARBAMOL 750 MG/1
TABLET, FILM COATED ORAL
Qty: 540 TABLET | Refills: 0 | Status: SHIPPED | OUTPATIENT
Start: 2021-07-06 | End: 2021-10-04

## 2021-07-06 NOTE — TELEPHONE ENCOUNTER
Medication request:METHOCARBAMOL 750 MG Oral Tab.   TAKE 2 TABLETS EVERY 8     HOURS AS NEEDED    LOV: 04/01/2021  NOV: 10/04/2021    [facility] #540 r-0

## 2021-07-23 NOTE — TELEPHONE ENCOUNTER
ITP PUMP Medication received. Verified by Ander Ball and Charity Lara RN . Dose correct and medication stored in lock box.     Rx #: [id number]  LOT #: [email or web address]  Expiration date: 08/21/2021

## 2021-07-23 NOTE — TELEPHONE ENCOUNTER
ITP PUMP Medication received. Verified by Gisselle Pavon and Wiley Smith RN. Dose correct and medication stored in lock box.     Rx #: [id number]  LOT #: [email or web address]  Expiration date:   8/21/2021

## 2021-07-26 ENCOUNTER — TELEPHONE (OUTPATIENT)
Dept: NEUROLOGY | Facility: CLINIC | Age: 56
End: 2021-07-26

## 2021-07-26 DIAGNOSIS — M25.461 EFFUSION OF RIGHT KNEE JOINT: Primary | ICD-10-CM

## 2021-07-26 NOTE — TELEPHONE ENCOUNTER
S/W pend patient would like Xray done of her right knee and states she discussed it in her LOV with Dr. Matthew Feliz. Patient states she twisted her knee and was a little swollen but did not fall. Please advise and pending to orders to sign if appropriate.

## 2021-07-27 ENCOUNTER — HOSPITAL ENCOUNTER (OUTPATIENT)
Dept: GENERAL RADIOLOGY | Age: 56
Discharge: HOME | End: 2021-07-27
Attending: PHYSICAL MEDICINE & REHABILITATION
Payer: MEDICARE

## 2021-07-27 ENCOUNTER — PATIENT MESSAGE (OUTPATIENT)
Dept: NEUROLOGY | Facility: CLINIC | Age: 56
End: 2021-07-27

## 2021-07-27 ENCOUNTER — OFFICE VISIT (OUTPATIENT)
Dept: PAIN CLINIC | Facility: CLINIC | Age: 56
End: 2021-07-27
Payer: MEDICARE

## 2021-07-27 VITALS
HEIGHT: 64 IN | DIASTOLIC BLOOD PRESSURE: 70 MMHG | RESPIRATION RATE: 16 BRPM | HEART RATE: 70 BPM | WEIGHT: 99 LBS | BODY MASS INDEX: 16.9 KG/M2 | TEMPERATURE: 97 F | OXYGEN SATURATION: 98 % | SYSTOLIC BLOOD PRESSURE: 120 MMHG

## 2021-07-27 DIAGNOSIS — Z97.8 PRESENCE OF INTRATHECAL PUMP: Primary | ICD-10-CM

## 2021-07-27 DIAGNOSIS — M25.461 EFFUSION OF RIGHT KNEE JOINT: ICD-10-CM

## 2021-07-27 PROCEDURE — 73560 X-RAY EXAM OF KNEE 1 OR 2: CPT | Performed by: PHYSICAL MEDICINE & REHABILITATION

## 2021-07-27 PROCEDURE — 62370 ANL SP INF PMP W/MDREPRG&FIL: CPT | Performed by: ANESTHESIOLOGY

## 2021-07-27 RX ORDER — LIDOCAINE HYDROCHLORIDE 10 MG/ML
2 INJECTION, SOLUTION INFILTRATION; PERINEURAL ONCE
Status: COMPLETED | OUTPATIENT
Start: 2021-07-27 | End: 2021-07-27

## 2021-07-27 NOTE — PROGRESS NOTES
Patient presents in office today with reported pain in lower back left side    Current pain level reported = 3/10    Last reported dose of norco 10-325mg took it this morning

## 2021-07-27 NOTE — TELEPHONE ENCOUNTER
From: Celia Garza  To: Szuanne Bansal MD  Sent: 7/27/2021 12:03 PM CDT  Subject: Visit Follow-up Question    Did My x-ray of my right knee get signed off by Dr Axel Bansal?  Let me know thank you Jenn Henderson

## 2021-07-27 NOTE — PROGRESS NOTES
Timeout completed prior to procedure @ 8692. Participants present for timeout:  Dr. Aleisha Allred, and patient. Pt reports that her pain pump is working well. No adjustments requested.      Expected Waste Amt: 4.7 ml    Actual Waste Amt: 9.2 ml

## 2021-08-02 NOTE — TELEPHONE ENCOUNTER
Patient left  requesting Raymond refill. Narcotic Refill Request    Medication request: HYDROcodone-acetaminophen  MG Oral Tab Take 1 tablet by mouth every 6 (six) hours as needed for Pain.     LOV: 4/1/21  NOV: 10/4/21     [place] refill: 7/3/2

## 2021-08-04 ENCOUNTER — PATIENT MESSAGE (OUTPATIENT)
Dept: NEUROLOGY | Facility: CLINIC | Age: 56
End: 2021-08-04

## 2021-08-04 NOTE — TELEPHONE ENCOUNTER
From: Linda Cobb  To: Eze Lynn MD  Sent: 8/4/2021 4:56 PM CDT  Subject: Prescription Question    Hi , I called on Monday for my Hydrocodone ( [place]) to be filled at [facility], the my chart service was down.  Could it be filled ,

## 2021-08-05 RX ORDER — HYDROCODONE BITARTRATE AND ACETAMINOPHEN 10; 325 MG/1; MG/1
1 TABLET ORAL EVERY 6 HOURS PRN
Qty: 120 TABLET | Refills: 0 | Status: SHIPPED | OUTPATIENT
Start: 2021-08-05 | End: 2021-09-02

## 2021-08-05 NOTE — PROCEDURES
[facility]  Operative Report  2021     Paco Freeman Patient Status:  No patient class for patient encounter    10/21/1965 MRN [id number]   Location [address] [place] [address], [address] [place] [address], [address] [place] [address] Attending No att. providers found   [facility]

## 2021-09-02 NOTE — TELEPHONE ENCOUNTER
Narcotic Refill Request    Medication request: HYDROcodone-acetaminophen  MG Oral Tab Take 1 tablet by mouth every 6 (six) hours as needed for Pain.     LOV: 4/1/21  NOV: 10/4/21     [place] refill: 8/5/21 #120

## 2021-09-03 RX ORDER — HYDROCODONE BITARTRATE AND ACETAMINOPHEN 10; 325 MG/1; MG/1
1 TABLET ORAL EVERY 6 HOURS PRN
Qty: 120 TABLET | Refills: 0 | Status: SHIPPED | OUTPATIENT
Start: 2021-09-04 | End: 2021-09-27

## 2021-09-07 ENCOUNTER — TELEPHONE (OUTPATIENT)
Dept: PAIN CLINIC | Facility: CLINIC | Age: 56
End: 2021-09-07

## 2021-09-07 DIAGNOSIS — M96.1 FAILED BACK SYNDROME OF LUMBAR SPINE: ICD-10-CM

## 2021-09-09 NOTE — TELEPHONE ENCOUNTER
From: Wendie Nageotte  To: Benny Vaz MD  Sent: 9/30/2020 11:00 AM CDT  Subject: Prescription Question    Hi everyone, could you have Dr Rauhl Vaz call in my October prescription for [place] to [facility], thank you so much, Adriana Guillermo
Medication request: Norco 10/325mg Oral Tab, #120, no refills  Take 1 tablet by mouth q6hr PRN pain    [place] ref[place]: 9/3/20    LOV: 9/21/20  NOV: Not yet scheduled
This patient needs to have a follow up as per the last office note.
no

## 2021-09-21 ENCOUNTER — TELEPHONE (OUTPATIENT)
Dept: PAIN CLINIC | Facility: CLINIC | Age: 56
End: 2021-09-21

## 2021-09-21 NOTE — TELEPHONE ENCOUNTER
Pt called and stated she would like to reschedule the ITP appt for earlier time the same day around 10:30 or 11 am.

## 2021-09-21 NOTE — TELEPHONE ENCOUNTER
ITP refill appt rescheduled to 10:30 AM on 10/14/2021 with Dr Leonid Camara. Patient notified of above. She verbalized understanding.

## 2021-09-27 RX ORDER — HYDROCODONE BITARTRATE AND ACETAMINOPHEN 10; 325 MG/1; MG/1
1 TABLET ORAL EVERY 6 HOURS PRN
Qty: 120 TABLET | Refills: 0 | Status: SHIPPED | OUTPATIENT
Start: 2021-10-04 | End: 2021-10-29

## 2021-09-27 NOTE — TELEPHONE ENCOUNTER
Narcotic Refill Request    Medication request: HYDROcodone-acetaminophen  MG Oral Tab Take 1 tablet by mouth every 6 (six) hours as needed for Pain.     LOV: 4/1/21  NOV: 10/4/21     [place] ref[place] 9/4/21 #120

## 2021-10-04 ENCOUNTER — TELEMEDICINE (OUTPATIENT)
Dept: PHYSICAL MEDICINE AND REHAB | Facility: CLINIC | Age: 56
End: 2021-10-04

## 2021-10-04 DIAGNOSIS — Q76.1 CERVICAL FUSION SYNDROME: ICD-10-CM

## 2021-10-04 DIAGNOSIS — G89.4 CHRONIC PAIN SYNDROME: ICD-10-CM

## 2021-10-04 DIAGNOSIS — Z97.8 PRESENCE OF INTRATHECAL PUMP: ICD-10-CM

## 2021-10-04 DIAGNOSIS — M51.26 LUMBAR HERNIATED DISC: ICD-10-CM

## 2021-10-04 DIAGNOSIS — M51.9 LUMBAR DISC DISEASE: ICD-10-CM

## 2021-10-04 DIAGNOSIS — M48.02 CERVICAL STENOSIS OF SPINE: ICD-10-CM

## 2021-10-04 DIAGNOSIS — M96.1 FAILED BACK SYNDROME OF LUMBAR SPINE: ICD-10-CM

## 2021-10-04 DIAGNOSIS — M96.1 LUMBAR POST-LAMINECTOMY SYNDROME: ICD-10-CM

## 2021-10-04 DIAGNOSIS — M48.061 LUMBAR FORAMINAL STENOSIS: ICD-10-CM

## 2021-10-04 DIAGNOSIS — M43.12 SPONDYLOLISTHESIS OF CERVICAL REGION: ICD-10-CM

## 2021-10-04 DIAGNOSIS — M54.16 LUMBAR RADICULOPATHY: Primary | ICD-10-CM

## 2021-10-04 DIAGNOSIS — M50.90 CERVICAL DISC DISEASE: ICD-10-CM

## 2021-10-04 PROCEDURE — 99213 OFFICE O/P EST LOW 20 MIN: CPT | Performed by: PHYSICAL MEDICINE & REHABILITATION

## 2021-10-04 RX ORDER — METHOCARBAMOL 750 MG/1
TABLET, FILM COATED ORAL
Qty: 540 TABLET | Refills: 0 | Status: SHIPPED | OUTPATIENT
Start: 2021-10-04 | End: 2022-01-03

## 2021-10-04 NOTE — TELEPHONE ENCOUNTER
Narcotic Refill Request    Medication request: METHOCARBAMOL 750 MG Oral Tab  TAKE 2 TABLETS EVERY 8     HOURS AS NEEDED    LOV: 4/1/21  NOV: 10/4/21     [place]/Last refill: 7/9/21 #540

## 2021-10-04 NOTE — PROGRESS NOTES
[address]  [facility]    Telemedicine Visit - Evaluation    Sergei Miller verbally consents to a Telemedicine Visit on 10/04/21. This visit is conducted using Telemedicine with live, interactive audio and video.     P 10/26/16    intrathecal morphine pump place per Dr. Lorenzo Flores   • [address]      per [employer]:  \"Mechanical complication of Nervous System device & implant graft. \"   • OTHER SURGICAL HISTORY      per [employer]:  'Oopherectomy\"   • OTHER SURGICA ALLERGIES:     Shellfish-Derived P*    TONGUE SWELLING  Cyclobenzaprine         NAUSEA AND VOMITING    Comment:Upset stomach  Latex                   OTHER (SEE COMMENTS)    Comment:blisters  Tizanidine              NAUSEA AND VOMITING  Baclofen Date    WBC 13.3 (H) 06/10/2017    RBC 3.98 06/10/2017    HGB 12.2 06/10/2017    HCT 36.7 06/10/2017    MCV 92.2 06/10/2017    MCH 30.7 06/10/2017    MCHC 33.2 06/10/2017    RDW 12.2 06/10/2017    .0 06/10/2017    MPV 8.5 06/08/2017     Lab Results condition and can affect treatment. The patient verbalized understanding with this plan and was in agreement. There are no barriers to learning. All questions were answered. Tim Gomez M.D.   Physical Medicine and Rehabilitation   10/4/202

## 2021-10-13 NOTE — TELEPHONE ENCOUNTER
ITP PUMP Medication received. Verified by Robbi Galindo and Sulema Mcgowan RN. Dose correct and medication stored in lock box.     Rx #: [id number]  LOT #: [email or web address]  Expiration date:   11/11/2021

## 2021-10-13 NOTE — TELEPHONE ENCOUNTER
ITP PUMP Medication received. Verified by Aracelis Wilson and Jassi Nuno RN.   Dose correct and medication stored in lock box.     Rx #: [id number]  LOT #: [email or web address]  Expiration date:   11/11/2021

## 2021-10-14 ENCOUNTER — OFFICE VISIT (OUTPATIENT)
Dept: PAIN CLINIC | Facility: CLINIC | Age: 56
End: 2021-10-14
Payer: MEDICARE

## 2021-10-14 VITALS
SYSTOLIC BLOOD PRESSURE: 100 MMHG | WEIGHT: 100 LBS | HEART RATE: 96 BPM | OXYGEN SATURATION: 98 % | BODY MASS INDEX: 17 KG/M2 | RESPIRATION RATE: 16 BRPM | DIASTOLIC BLOOD PRESSURE: 70 MMHG

## 2021-10-14 DIAGNOSIS — Z97.8 PRESENCE OF INTRATHECAL PUMP: Primary | ICD-10-CM

## 2021-10-14 PROCEDURE — 62370 ANL SP INF PMP W/MDREPRG&FIL: CPT | Performed by: ANESTHESIOLOGY

## 2021-10-14 RX ORDER — LIDOCAINE HYDROCHLORIDE 10 MG/ML
2 INJECTION, SOLUTION INFILTRATION; PERINEURAL ONCE
Status: COMPLETED | OUTPATIENT
Start: 2021-10-14 | End: 2021-10-14

## 2021-10-14 NOTE — PROGRESS NOTES
Timeout completed prior to procedure @ 3260. Participants present for timeout:  Dr. Hiro Guerrero, and patient.     Residual Volume per [employer]- 5.9 mL    Wasted - 11 mL

## 2021-10-14 NOTE — PROGRESS NOTES
Patient presents in office today with reported pain in low back     Current pain level reported = 4/10    Last reported dose of morphine this AM       Narcotic Contract renewal na    Urine Drug screen na

## 2021-10-18 NOTE — PROCEDURES
[facility]  Operative Report  10/14/2021     Elease Ramp Patient Status:  No patient class for patient encounter    10/21/1965 MRN [id number]   Location [address] [place] [address], [address] [place] [address], [address] [place] [address] Attending No att. providers found   [facility]

## 2021-10-29 RX ORDER — HYDROCODONE BITARTRATE AND ACETAMINOPHEN 10; 325 MG/1; MG/1
1 TABLET ORAL EVERY 6 HOURS PRN
Qty: 120 TABLET | Refills: 0 | Status: SHIPPED | OUTPATIENT
Start: 2021-11-03 | End: 2021-11-30

## 2021-10-29 NOTE — TELEPHONE ENCOUNTER
Medication request: Norco 10-325mg q6h prn pain    LOV 10/4/21-f/u in 6mos  No follow up    [place] ref[place] 10/4/21 #120

## 2021-11-03 ENCOUNTER — TELEPHONE (OUTPATIENT)
Dept: PAIN CLINIC | Facility: CLINIC | Age: 56
End: 2021-11-03

## 2021-11-03 DIAGNOSIS — M96.1 FAILED BACK SYNDROME OF LUMBAR SPINE: ICD-10-CM

## 2021-11-03 NOTE — TELEPHONE ENCOUNTER
ITP REFILL due by 1/8/2022  Please place order for ITP Rx.   Last medication ordered:  morphINE Sulfate (PF) 400 mg in Sodium Chloride 40 mL IT infusion    OV scheduled 1/3/2022 w/Mick MAKI

## 2021-12-01 RX ORDER — HYDROCODONE BITARTRATE AND ACETAMINOPHEN 10; 325 MG/1; MG/1
1 TABLET ORAL EVERY 6 HOURS PRN
Qty: 120 TABLET | Refills: 0 | Status: SHIPPED | OUTPATIENT
Start: 2021-12-03 | End: 2022-01-01

## 2021-12-01 NOTE — TELEPHONE ENCOUNTER
Medication request: Norco 10-325mg q6h prn pain    LOV 10/4/21 -f/u in 6 mos  No follow up scheduled    [place] ref[place]: 11/3/21 #120    [employer] message sent to patient to schedule follow up around 4/2022

## 2021-12-29 NOTE — TELEPHONE ENCOUNTER
ITP PUMP Medication received. Verified by Dilip Rajan. Dose correct and medication stored in lock box.     Rx #: [id number]  LOT #: [email or web address]  Expiration date:   1/27/2022

## 2022-01-03 ENCOUNTER — OFFICE VISIT (OUTPATIENT)
Dept: PAIN CLINIC | Facility: CLINIC | Age: 57
End: 2022-01-03
Payer: MEDICARE

## 2022-01-03 VITALS
HEART RATE: 89 BPM | WEIGHT: 98 LBS | BODY MASS INDEX: 17 KG/M2 | SYSTOLIC BLOOD PRESSURE: 106 MMHG | OXYGEN SATURATION: 100 % | DIASTOLIC BLOOD PRESSURE: 60 MMHG

## 2022-01-03 DIAGNOSIS — Z97.8 PRESENCE OF INTRATHECAL PUMP: Primary | ICD-10-CM

## 2022-01-03 PROCEDURE — 62370 ANL SP INF PMP W/MDREPRG&FIL: CPT | Performed by: PHYSICIAN ASSISTANT

## 2022-01-03 RX ORDER — METHOCARBAMOL 750 MG/1
TABLET, FILM COATED ORAL
Qty: 540 TABLET | Refills: 0 | Status: SHIPPED | OUTPATIENT
Start: 2022-01-03 | End: 2022-01-10

## 2022-01-03 RX ORDER — HYDROCODONE BITARTRATE AND ACETAMINOPHEN 10; 325 MG/1; MG/1
1 TABLET ORAL EVERY 6 HOURS PRN
Qty: 120 TABLET | Refills: 0 | Status: SHIPPED | OUTPATIENT
Start: 2022-01-03 | End: 2022-01-31

## 2022-01-03 NOTE — PROGRESS NOTES
Timeout completed prior to procedure @ 6660. Participants present for timeout:  Anthony MAKI, RN Alexander Yo, and patient. Pt reports that her pump is working well, managing pain to her satisfaction.      Expected Waste Amt: 5.2 mL    Actual Waste Amt: 10

## 2022-01-03 NOTE — PROGRESS NOTES
Patient presents in office today with reported pain in lower back    Current pain level reported = 3/10    Last reported dose of ITP      Narcotic Contract renewal n/a    Urine Drug screen n/a

## 2022-01-03 NOTE — PROGRESS NOTES
HPI:   Kelsea Quiroga presents for intra-thecal pump medication refill. She presents with complaints of Low back pain. The pain is described as moderate aching that is constant . The patient’s activity level has remained the same since last visit. delivery of 4 doses per 24 hours. , If all PA doses utilized then patient will receive 4.378 mg per 24 hours. and This would be a 21.7 % increase over the simple continous dose for 24 hour period.   Reservoir Volume: 40 mL  Low Reservoir Volume: 2 mL  Teleme verbalized understanding. Medical Decision Making:   Diagnosis:    Presence of intrathecal pump  (primary encounter diagnosis)     Impression: refilled IT pump without changes to rate/concentration. Reprogrammed to reflect new alarm date and volume.

## 2022-01-03 NOTE — TELEPHONE ENCOUNTER
Medication request: METHOCARBAMOL 750 MG Oral Tab TAKE 2 TABLETS EVERY 8     HOURS AS NEEDED    LOV: 10/4/21-televisit  NOV: 4/4/22     [place] refill: 10/4/21 #540  Urine drug screen (if applicable): n/a

## 2022-01-03 NOTE — TELEPHONE ENCOUNTER
Narcotic Refill Request    Medication request: HYDROcodone-acetaminophen  MG Oral Tab   Take 1 tablet by mouth every 6 (six) hours as needed for Pain.     LOV: 10/4/21  NOV:4/4/22     [place] refill: 12/3/21 #120  Urine drug screen (if applicable):

## 2022-01-12 ENCOUNTER — TELEPHONE (OUTPATIENT)
Dept: NEUROLOGY | Facility: CLINIC | Age: 57
End: 2022-01-12

## 2022-01-12 RX ORDER — METHOCARBAMOL 750 MG/1
750 TABLET, FILM COATED ORAL 3 TIMES DAILY
Qty: 540 TABLET | Refills: 3 | Status: SHIPPED | OUTPATIENT
Start: 2022-01-12

## 2022-01-12 NOTE — TELEPHONE ENCOUNTER
Fax received from [address] [place] [address] confirming patient's information for patient to complete the refill request. form plased in 's folder by the nurses bin

## 2022-01-13 ENCOUNTER — TELEPHONE (OUTPATIENT)
Dept: PHYSICAL MEDICINE AND REHAB | Facility: CLINIC | Age: 57
End: 2022-01-13

## 2022-01-13 NOTE — TELEPHONE ENCOUNTER
Rec'vd fax of missing instructions of a prescription. Placed in Dr. Delio Ochoa at the nurses station.

## 2022-01-14 NOTE — TELEPHONE ENCOUNTER
Spoke with patient in regards to PA, patient stated that Baclofen, Cyclobenzaprine and Choloroxazone has given her reactions. Tried and failed all rx.

## 2022-02-01 ENCOUNTER — TELEPHONE (OUTPATIENT)
Dept: PAIN CLINIC | Facility: CLINIC | Age: 57
End: 2022-02-01

## 2022-02-01 NOTE — TELEPHONE ENCOUNTER
ITP REFILL due by 3/30/2022. Please place order for ITP Rx.   Last medication ordered:  morphINE Sulfate (PF) 400 mg in Sodium Chloride 40 mL IT infusion    OV scheduled 3/28/2022

## 2022-02-02 RX ORDER — HYDROCODONE BITARTRATE AND ACETAMINOPHEN 10; 325 MG/1; MG/1
1 TABLET ORAL EVERY 6 HOURS PRN
Qty: 120 TABLET | Refills: 0 | Status: CANCELLED | OUTPATIENT
Start: 2022-02-03 | End: 2022-03-05

## 2022-02-02 NOTE — TELEPHONE ENCOUNTER
Drug hydrocodone 10325 #120 pend to Dr Mague Simmons    Last refill 1/4/22    Last office visit 10/4/21    Next appointment 4/4/22    [place]

## 2022-02-03 RX ORDER — HYDROCODONE BITARTRATE AND ACETAMINOPHEN 10; 325 MG/1; MG/1
1 TABLET ORAL EVERY 6 HOURS PRN
Qty: 120 TABLET | Refills: 0 | Status: SHIPPED | OUTPATIENT
Start: 2022-02-03 | End: 2022-02-28

## 2022-02-28 RX ORDER — HYDROCODONE BITARTRATE AND ACETAMINOPHEN 10; 325 MG/1; MG/1
1 TABLET ORAL EVERY 6 HOURS PRN
Qty: 120 TABLET | Refills: 0 | Status: SHIPPED | OUTPATIENT
Start: 2022-03-05 | End: 2022-04-04

## 2022-02-28 NOTE — TELEPHONE ENCOUNTER
Refill Request    Medication request: HYDROcodone-acetaminophen  MG Oral Tab Take 1 tablet by mouth every 6 (six) hours as needed for Pain.     LOV: 10/4/21- televisit  Due back to clinic per last office note:  \"follow up in 6 months \"  NOV: 4/4/2022 Toby Rivera MD      [facility]: [facility]    Urine drug screen (if applicable): none  Pain contract: Valid until 8/2/22    LOV plan (if weaning or changing medications): No change

## 2022-03-25 NOTE — TELEPHONE ENCOUNTER
ITP PUMP Medication received. Verified by Quique Maldonado and Salma MAKI. Dose correct and medication stored in lock box.     Rx #: [id number]  LOT #: [email or web address]  Expiration date:   4/23/2022

## 2022-03-30 ENCOUNTER — OFFICE VISIT (OUTPATIENT)
Dept: PAIN CLINIC | Facility: CLINIC | Age: 57
End: 2022-03-30
Payer: MEDICARE

## 2022-03-30 VITALS — DIASTOLIC BLOOD PRESSURE: 70 MMHG | HEART RATE: 99 BPM | SYSTOLIC BLOOD PRESSURE: 100 MMHG | OXYGEN SATURATION: 96 %

## 2022-03-30 DIAGNOSIS — Z97.8 PRESENCE OF INTRATHECAL PUMP: ICD-10-CM

## 2022-03-30 DIAGNOSIS — M96.1 FAILED BACK SYNDROME OF LUMBAR SPINE: Primary | ICD-10-CM

## 2022-03-30 PROCEDURE — 62370 ANL SP INF PMP W/MDREPRG&FIL: CPT | Performed by: PHYSICIAN ASSISTANT

## 2022-03-30 NOTE — PROGRESS NOTES
Patient presents in office today with reported pain in low back     Current pain level reported = 3/10    Last reported dose of morphine ITP      Narcotic Contract renewal na    Urine Drug screen na

## 2022-03-30 NOTE — PROGRESS NOTES
Timeout completed prior to procedure @ 1320. Participants present for timeout:  Marleni MAKI, RN Barbie Steinberg, and patient. Expected Wast Amt: 2.8 mL    Actual Waste Amt: 9.5 mL    Pt reports that she feels the time of her boluses is incorrect. Pt reports that she uses nearly all boluses available and administers them Q4H.

## 2022-04-04 NOTE — TELEPHONE ENCOUNTER
Drug hydrocodone  #90 R 0 pend to Dr Vicky Flores office visit 10/4/21. Plan continue pain medication. Next appointment 4/5/22. [place]  [place]

## 2022-04-05 ENCOUNTER — TELEMEDICINE (OUTPATIENT)
Dept: PHYSICAL MEDICINE AND REHAB | Facility: CLINIC | Age: 57
End: 2022-04-05

## 2022-04-05 DIAGNOSIS — M54.16 LUMBAR RADICULOPATHY: Primary | ICD-10-CM

## 2022-04-05 DIAGNOSIS — M48.061 LUMBAR FORAMINAL STENOSIS: ICD-10-CM

## 2022-04-05 DIAGNOSIS — M51.9 LUMBAR DISC DISEASE: ICD-10-CM

## 2022-04-05 DIAGNOSIS — Q76.1 CERVICAL FUSION SYNDROME: ICD-10-CM

## 2022-04-05 DIAGNOSIS — M51.26 LUMBAR HERNIATED DISC: ICD-10-CM

## 2022-04-05 DIAGNOSIS — G89.4 CHRONIC PAIN SYNDROME: ICD-10-CM

## 2022-04-05 PROCEDURE — 99213 OFFICE O/P EST LOW 20 MIN: CPT | Performed by: PHYSICAL MEDICINE & REHABILITATION

## 2022-04-05 RX ORDER — HYDROCODONE BITARTRATE AND ACETAMINOPHEN 10; 325 MG/1; MG/1
1 TABLET ORAL EVERY 6 HOURS PRN
Qty: 120 TABLET | Refills: 0 | Status: SHIPPED | OUTPATIENT
Start: 2022-04-05 | End: 2022-05-05

## 2022-04-07 ENCOUNTER — HOSPITAL ENCOUNTER (OUTPATIENT)
Dept: CT IMAGING | Facility: HOSPITAL | Age: 57
Discharge: HOME | End: 2022-04-07
Attending: PHYSICIAN ASSISTANT
Payer: MEDICARE

## 2022-04-07 DIAGNOSIS — Z97.8 PRESENCE OF INTRATHECAL PUMP: ICD-10-CM

## 2022-04-07 DIAGNOSIS — M96.1 FAILED BACK SYNDROME OF LUMBAR SPINE: ICD-10-CM

## 2022-04-07 PROCEDURE — 72128 CT CHEST SPINE W/O DYE: CPT | Performed by: PHYSICIAN ASSISTANT

## 2022-04-08 ENCOUNTER — TELEPHONE (OUTPATIENT)
Dept: PAIN CLINIC | Facility: CLINIC | Age: 57
End: 2022-04-08

## 2022-04-08 NOTE — TELEPHONE ENCOUNTER
Pt to be scheduled for an OV w/ [place] Clune present. Contacted pt to offer to schedule OV. Pt states Rosibel Carlisle told her that the battery may be the issue and she has noticed timing issues/inconsistencies with her boluses for the past few weeks. Advised pt we would like to determine if it is a battery issue or a mechanical issue, therefore [place] Paulette will be present during the visit. Pt VU. Placed on scheduled for 4/14/22. Clari Bunch to discuss and ask that he be present during visit. Zonia Barnes and states it will most likely be Tristin Polanco to join the visit.

## 2022-04-08 NOTE — TELEPHONE ENCOUNTER
----- Message from [address] [place] [address] sent at 4/7/2022  3:06 PM CDT -----  Does not appear to be a granuloma formation at catheter tip. Will review at follow up.

## 2022-04-14 ENCOUNTER — OFFICE VISIT (OUTPATIENT)
Dept: PAIN CLINIC | Facility: CLINIC | Age: 57
End: 2022-04-14
Payer: MEDICARE

## 2022-04-14 VITALS — OXYGEN SATURATION: 93 % | DIASTOLIC BLOOD PRESSURE: 80 MMHG | SYSTOLIC BLOOD PRESSURE: 110 MMHG | HEART RATE: 93 BPM

## 2022-04-14 DIAGNOSIS — T85.610A MALFUNCTION OF INTRATHECAL INFUSION PUMP, INITIAL ENCOUNTER: Primary | ICD-10-CM

## 2022-04-14 PROCEDURE — 99214 OFFICE O/P EST MOD 30 MIN: CPT | Performed by: PHYSICIAN ASSISTANT

## 2022-04-14 NOTE — PROGRESS NOTES
Patient presents in office today with reported pain in low back     Current pain level reported = 2/10    Last reported dose of morphine TPI      Narcotic Contract renewal na    Urine Drug screen na

## 2022-04-19 ENCOUNTER — TELEPHONE (OUTPATIENT)
Dept: SURGERY | Facility: CLINIC | Age: 57
End: 2022-04-19

## 2022-04-19 ENCOUNTER — OFFICE VISIT (OUTPATIENT)
Dept: SURGERY | Facility: CLINIC | Age: 57
End: 2022-04-19
Payer: MEDICARE

## 2022-04-19 VITALS
HEIGHT: 64 IN | BODY MASS INDEX: 16.73 KG/M2 | DIASTOLIC BLOOD PRESSURE: 62 MMHG | HEART RATE: 82 BPM | WEIGHT: 98 LBS | SYSTOLIC BLOOD PRESSURE: 122 MMHG

## 2022-04-19 DIAGNOSIS — Z97.8 PRESENCE OF INTRATHECAL PUMP: Primary | ICD-10-CM

## 2022-04-19 PROCEDURE — 99203 OFFICE O/P NEW LOW 30 MIN: CPT | Performed by: NEUROLOGICAL SURGERY

## 2022-04-19 NOTE — TELEPHONE ENCOUNTER
Patient states she believes she has missed a call from the office. Patient was just seen in the office this morning, 4/19/22. Please contact to discuss. If patient was not called please disregard.

## 2022-04-22 ENCOUNTER — HOSPITAL ENCOUNTER (OUTPATIENT)
Dept: GENERAL RADIOLOGY | Facility: HOSPITAL | Age: 57
Discharge: HOME | End: 2022-04-22
Attending: NEUROLOGICAL SURGERY
Payer: MEDICARE

## 2022-04-22 DIAGNOSIS — Z97.8 PRESENCE OF INTRATHECAL PUMP: ICD-10-CM

## 2022-04-22 PROCEDURE — 74021 RADEX ABDOMEN 3+ VIEWS: CPT | Performed by: NEUROLOGICAL SURGERY

## 2022-04-25 NOTE — TELEPHONE ENCOUNTER
Patient is scheduled for Morphine pump replacement on 5/9/22 with Dr Rosangela Heart. Y  form completed- Verified codes with Rick Payton.   Y Surgery order signed  Kira Life on surgery sheet  Y Placed on outlook calendar  [facility] with sx instructions  Y Faxed pre-op clearance request to PCP Linda Minaya E-mail sent to [facility]  Y Post-op appointments made   Y PA Not Needed . Routed to PA team to verify  Y 3 day follow up reminder placed.      Routed to pain management for medication order

## 2022-04-26 NOTE — TELEPHONE ENCOUNTER
ITP REPLACEMENT SURGERY scheduled 5/9/2022 w/ Dr. Norma Dickerson. Please place order for ITP Rx.   Last medication ordered:  morphINE (PF) 400 mg in sodium chloride 0.9% (PF) 40 mL IT infusion

## 2022-05-02 ENCOUNTER — PATIENT MESSAGE (OUTPATIENT)
Dept: PHYSICAL MEDICINE AND REHAB | Facility: CLINIC | Age: 57
End: 2022-05-02

## 2022-05-02 NOTE — TELEPHONE ENCOUNTER
Refill Request    Medication request: HYDROcodone-acetaminophen  MG Oral Tab. Take 1 tablet by mouth every 6 (six) hours as needed for Pain. LOV: 4/5/2022 (TELE) with Dr. Madhu Connor  Due back to clinic per last office note:  Per Dr. Madhu Connor: Korey Stovall will follow up in 6 months or sooner if needed. \"   NOV: NONE      [place] ref[place]: 4/6/2022 #120  Pharmacy not to dispense medication before 5/6/22. Added to order at this time. Urine drug screen (if applicable): none  Pain contract: VALID - Expires 8/2/2022    LOV plan (if weaning or changing medications): Per Dr. Madhu Connor: Korey Stovall will continue with the Robaxin and the Norco for the pain. \"       Patient will need NOV. Messaged Patient via [employer] to update/remind to make [address] appointment.

## 2022-05-02 NOTE — TELEPHONE ENCOUNTER
From: Wilmer Marc  To: Clary Martínez MD  Sent: 5/2/2022 9:26 AM CDT  Subject: Update    Hi Dr Miller Martínez, I'm having surgery for my pump replacement on 5/9 at [facility]. The battery on my pump is malfunctioning si it needs to be replaced. Thank you, I will keep you updated if any further details.  Annika Stroud

## 2022-05-04 RX ORDER — HYDROCODONE BITARTRATE AND ACETAMINOPHEN 10; 325 MG/1; MG/1
1 TABLET ORAL EVERY 6 HOURS PRN
Qty: 120 TABLET | Refills: 0 | Status: ON HOLD | OUTPATIENT
Start: 2022-05-06 | End: 2022-06-05

## 2022-05-06 ENCOUNTER — HOSPITAL ENCOUNTER (OUTPATIENT)
Dept: GENERAL RADIOLOGY | Age: 57
Discharge: HOME | End: 2022-05-06
Attending: NEUROLOGICAL SURGERY
Payer: MEDICARE

## 2022-05-06 ENCOUNTER — NURSE ONLY (OUTPATIENT)
Dept: LAB | Age: 57
End: 2022-05-06
Attending: NEUROLOGICAL SURGERY
Payer: MEDICARE

## 2022-05-06 ENCOUNTER — LAB ENCOUNTER (OUTPATIENT)
Dept: LAB | Age: 57
End: 2022-05-06
Attending: NEUROLOGICAL SURGERY
Payer: MEDICARE

## 2022-05-06 DIAGNOSIS — Z01.812 ENCOUNTER FOR PREOPERATIVE SCREENING LABORATORY TESTING FOR COVID-19 VIRUS: ICD-10-CM

## 2022-05-06 DIAGNOSIS — Z97.8 PRESENCE OF INTRATHECAL PUMP: ICD-10-CM

## 2022-05-06 DIAGNOSIS — Z20.822 ENCOUNTER FOR PREOPERATIVE SCREENING LABORATORY TESTING FOR COVID-19 VIRUS: ICD-10-CM

## 2022-05-06 LAB
ANION GAP SERPL CALC-SCNC: 3 MMOL/L (ref 0–18)
APTT PPP: 33.1 SECONDS (ref 23.3–35.6)
BASOPHILS # BLD AUTO: 0.08 X10(3) UL (ref 0–0.2)
BASOPHILS NFR BLD AUTO: 1.5 %
BUN BLD-MCNC: 10 MG/DL (ref 7–18)
BUN/CREAT SERPL: 11.4 (ref 10–20)
CALCIUM BLD-MCNC: 9.4 MG/DL (ref 8.5–10.1)
CHLORIDE SERPL-SCNC: 96 MMOL/L (ref 98–112)
CO2 SERPL-SCNC: 35 MMOL/L (ref 21–32)
CREAT BLD-MCNC: 0.88 MG/DL
DEPRECATED RDW RBC AUTO: 42.3 FL (ref 35.1–46.3)
EOSINOPHIL # BLD AUTO: 0.15 X10(3) UL (ref 0–0.7)
EOSINOPHIL NFR BLD AUTO: 2.7 %
ERYTHROCYTE [DISTWIDTH] IN BLOOD BY AUTOMATED COUNT: 12.2 % (ref 11–15)
FASTING STATUS PATIENT QL REPORTED: YES
GLUCOSE BLD-MCNC: 161 MG/DL (ref 70–99)
HCT VFR BLD AUTO: 42.4 %
HGB BLD-MCNC: 14 G/DL
IMM GRANULOCYTES # BLD AUTO: 0.01 X10(3) UL (ref 0–1)
IMM GRANULOCYTES NFR BLD: 0.2 %
INR BLD: 0.85 (ref 0.8–1.2)
LYMPHOCYTES # BLD AUTO: 1.15 X10(3) UL (ref 1–4)
LYMPHOCYTES NFR BLD AUTO: 20.9 %
MCH RBC QN AUTO: 31.2 PG (ref 26–34)
MCHC RBC AUTO-ENTMCNC: 33 G/DL (ref 31–37)
MCV RBC AUTO: 94.4 FL
MONOCYTES # BLD AUTO: 0.25 X10(3) UL (ref 0.1–1)
MONOCYTES NFR BLD AUTO: 4.5 %
NEUTROPHILS # BLD AUTO: 3.87 X10 (3) UL (ref 1.5–7.7)
NEUTROPHILS # BLD AUTO: 3.87 X10(3) UL (ref 1.5–7.7)
NEUTROPHILS NFR BLD AUTO: 70.2 %
OSMOLALITY SERPL CALC.SUM OF ELEC: 281 MOSM/KG (ref 275–295)
PLATELET # BLD AUTO: 272 10(3)UL (ref 150–450)
POTASSIUM SERPL-SCNC: 4 MMOL/L (ref 3.5–5.1)
PROTHROMBIN TIME: 11.7 SECONDS (ref 11.6–14.8)
RBC # BLD AUTO: 4.49 X10(6)UL
SODIUM SERPL-SCNC: 134 MMOL/L (ref 136–145)
WBC # BLD AUTO: 5.5 X10(3) UL (ref 4–11)

## 2022-05-06 PROCEDURE — 36415 COLL VENOUS BLD VENIPUNCTURE: CPT

## 2022-05-06 PROCEDURE — 86900 BLOOD TYPING SEROLOGIC ABO: CPT

## 2022-05-06 PROCEDURE — 86850 RBC ANTIBODY SCREEN: CPT

## 2022-05-06 PROCEDURE — 85610 PROTHROMBIN TIME: CPT

## 2022-05-06 PROCEDURE — 87081 CULTURE SCREEN ONLY: CPT

## 2022-05-06 PROCEDURE — 93005 ELECTROCARDIOGRAM TRACING: CPT

## 2022-05-06 PROCEDURE — 85730 THROMBOPLASTIN TIME PARTIAL: CPT

## 2022-05-06 PROCEDURE — 85025 COMPLETE CBC W/AUTO DIFF WBC: CPT

## 2022-05-06 PROCEDURE — 86901 BLOOD TYPING SEROLOGIC RH(D): CPT

## 2022-05-06 PROCEDURE — 71046 X-RAY EXAM CHEST 2 VIEWS: CPT | Performed by: NEUROLOGICAL SURGERY

## 2022-05-06 PROCEDURE — 80048 BASIC METABOLIC PNL TOTAL CA: CPT

## 2022-05-06 PROCEDURE — 93010 ELECTROCARDIOGRAM REPORT: CPT | Performed by: NEUROLOGICAL SURGERY

## 2022-05-07 LAB
ANTIBODY SCREEN: NEGATIVE
RH BLOOD TYPE: POSITIVE
SARS-COV-2 RNA RESP QL NAA+PROBE: NOT DETECTED

## 2022-05-08 ENCOUNTER — ANESTHESIA EVENT (OUTPATIENT)
Dept: SURGERY | Facility: HOSPITAL | Age: 57
End: 2022-05-08
Payer: MEDICARE

## 2022-05-09 ENCOUNTER — ANESTHESIA (OUTPATIENT)
Dept: SURGERY | Facility: HOSPITAL | Age: 57
End: 2022-05-09
Payer: MEDICARE

## 2022-05-09 ENCOUNTER — HOSPITAL ENCOUNTER (OUTPATIENT)
Facility: HOSPITAL | Age: 57
Discharge: HOME | End: 2022-05-10
Attending: NEUROLOGICAL SURGERY | Admitting: NEUROLOGICAL SURGERY
Payer: MEDICARE

## 2022-05-09 DIAGNOSIS — Z97.8 PRESENCE OF INTRATHECAL PUMP: ICD-10-CM

## 2022-05-09 DIAGNOSIS — Z20.822 ENCOUNTER FOR PREOPERATIVE SCREENING LABORATORY TESTING FOR COVID-19 VIRUS: Primary | ICD-10-CM

## 2022-05-09 DIAGNOSIS — Z01.812 ENCOUNTER FOR PREOPERATIVE SCREENING LABORATORY TESTING FOR COVID-19 VIRUS: Primary | ICD-10-CM

## 2022-05-09 PROCEDURE — 0JPT0VZ REMOVAL OF INFUSION PUMP FROM TRUNK SUBCUTANEOUS TISSUE AND FASCIA, OPEN APPROACH: ICD-10-PCS | Performed by: NEUROLOGICAL SURGERY

## 2022-05-09 PROCEDURE — 0JH80VZ INSERTION OF INFUSION PUMP INTO ABDOMEN SUBCUTANEOUS TISSUE AND FASCIA, OPEN APPROACH: ICD-10-PCS | Performed by: NEUROLOGICAL SURGERY

## 2022-05-09 DEVICE — INTRATHECAL PUMP 8637-40: Type: IMPLANTABLE DEVICE | Site: ABDOMEN | Status: FUNCTIONAL

## 2022-05-09 RX ORDER — MIDAZOLAM HYDROCHLORIDE 1 MG/ML
1 INJECTION INTRAMUSCULAR; INTRAVENOUS EVERY 5 MIN PRN
Status: DISCONTINUED | OUTPATIENT
Start: 2022-05-09 | End: 2022-05-09 | Stop reason: HOSPADM

## 2022-05-09 RX ORDER — METHOCARBAMOL 750 MG/1
750 TABLET, FILM COATED ORAL 3 TIMES DAILY
Status: DISCONTINUED | OUTPATIENT
Start: 2022-05-09 | End: 2022-05-10

## 2022-05-09 RX ORDER — HYDROCODONE BITARTRATE AND ACETAMINOPHEN 10; 325 MG/1; MG/1
1 TABLET ORAL ONCE AS NEEDED
Status: DISCONTINUED | OUTPATIENT
Start: 2022-05-09 | End: 2022-05-09 | Stop reason: HOSPADM

## 2022-05-09 RX ORDER — HYDROMORPHONE HYDROCHLORIDE 1 MG/ML
INJECTION, SOLUTION INTRAMUSCULAR; INTRAVENOUS; SUBCUTANEOUS
Status: COMPLETED
Start: 2022-05-09 | End: 2022-05-09

## 2022-05-09 RX ORDER — DIPHENHYDRAMINE HCL 25 MG
25 CAPSULE ORAL EVERY 4 HOURS PRN
Status: DISCONTINUED | OUTPATIENT
Start: 2022-05-09 | End: 2022-05-10

## 2022-05-09 RX ORDER — HYDROCODONE BITARTRATE AND ACETAMINOPHEN 10; 325 MG/1; MG/1
2 TABLET ORAL EVERY 4 HOURS PRN
Status: DISCONTINUED | OUTPATIENT
Start: 2022-05-09 | End: 2022-05-10

## 2022-05-09 RX ORDER — LIDOCAINE HYDROCHLORIDE 10 MG/ML
INJECTION, SOLUTION EPIDURAL; INFILTRATION; INTRACAUDAL; PERINEURAL AS NEEDED
Status: DISCONTINUED | OUTPATIENT
Start: 2022-05-09 | End: 2022-05-09 | Stop reason: SURG

## 2022-05-09 RX ORDER — ACETAMINOPHEN 325 MG/1
650 TABLET ORAL EVERY 4 HOURS PRN
Status: DISCONTINUED | OUTPATIENT
Start: 2022-05-09 | End: 2022-05-10

## 2022-05-09 RX ORDER — CYCLOBENZAPRINE HCL 5 MG
5 TABLET ORAL EVERY 6 HOURS PRN
Status: DISCONTINUED | OUTPATIENT
Start: 2022-05-09 | End: 2022-05-10

## 2022-05-09 RX ORDER — DOXEPIN HYDROCHLORIDE 50 MG/1
1 CAPSULE ORAL DAILY
Status: DISCONTINUED | OUTPATIENT
Start: 2022-05-09 | End: 2022-05-10

## 2022-05-09 RX ORDER — DIPHENHYDRAMINE HYDROCHLORIDE 50 MG/ML
25 INJECTION INTRAMUSCULAR; INTRAVENOUS EVERY 4 HOURS PRN
Status: DISCONTINUED | OUTPATIENT
Start: 2022-05-09 | End: 2022-05-10

## 2022-05-09 RX ORDER — DEXAMETHASONE SODIUM PHOSPHATE 4 MG/ML
VIAL (ML) INJECTION AS NEEDED
Status: DISCONTINUED | OUTPATIENT
Start: 2022-05-09 | End: 2022-05-09 | Stop reason: SURG

## 2022-05-09 RX ORDER — SODIUM CHLORIDE, SODIUM LACTATE, POTASSIUM CHLORIDE, CALCIUM CHLORIDE 600; 310; 30; 20 MG/100ML; MG/100ML; MG/100ML; MG/100ML
INJECTION, SOLUTION INTRAVENOUS CONTINUOUS
Status: DISCONTINUED | OUTPATIENT
Start: 2022-05-09 | End: 2022-05-09

## 2022-05-09 RX ORDER — ONDANSETRON 2 MG/ML
4 INJECTION INTRAMUSCULAR; INTRAVENOUS EVERY 6 HOURS PRN
Status: DISCONTINUED | OUTPATIENT
Start: 2022-05-09 | End: 2022-05-09 | Stop reason: HOSPADM

## 2022-05-09 RX ORDER — HYDROMORPHONE HYDROCHLORIDE 1 MG/ML
0.6 INJECTION, SOLUTION INTRAMUSCULAR; INTRAVENOUS; SUBCUTANEOUS EVERY 5 MIN PRN
Status: DISCONTINUED | OUTPATIENT
Start: 2022-05-09 | End: 2022-05-09 | Stop reason: HOSPADM

## 2022-05-09 RX ORDER — POLYETHYLENE GLYCOL 3350 17 G/17G
17 POWDER, FOR SOLUTION ORAL DAILY PRN
Status: DISCONTINUED | OUTPATIENT
Start: 2022-05-09 | End: 2022-05-10

## 2022-05-09 RX ORDER — ACETAMINOPHEN 500 MG
1000 TABLET ORAL ONCE AS NEEDED
Status: DISCONTINUED | OUTPATIENT
Start: 2022-05-09 | End: 2022-05-09 | Stop reason: HOSPADM

## 2022-05-09 RX ORDER — SCOLOPAMINE TRANSDERMAL SYSTEM 1 MG/1
1 PATCH, EXTENDED RELEASE TRANSDERMAL ONCE
Status: DISCONTINUED | OUTPATIENT
Start: 2022-05-09 | End: 2022-05-10

## 2022-05-09 RX ORDER — NALOXONE HYDROCHLORIDE 0.4 MG/ML
80 INJECTION, SOLUTION INTRAMUSCULAR; INTRAVENOUS; SUBCUTANEOUS AS NEEDED
Status: DISCONTINUED | OUTPATIENT
Start: 2022-05-09 | End: 2022-05-09 | Stop reason: HOSPADM

## 2022-05-09 RX ORDER — ACETAMINOPHEN 500 MG
1000 TABLET ORAL ONCE
Status: DISCONTINUED | OUTPATIENT
Start: 2022-05-09 | End: 2022-05-09 | Stop reason: HOSPADM

## 2022-05-09 RX ORDER — LABETALOL HYDROCHLORIDE 5 MG/ML
5 INJECTION, SOLUTION INTRAVENOUS EVERY 5 MIN PRN
Status: DISCONTINUED | OUTPATIENT
Start: 2022-05-09 | End: 2022-05-09 | Stop reason: HOSPADM

## 2022-05-09 RX ORDER — MORPHINE SULFATE 4 MG/ML
4 INJECTION, SOLUTION INTRAMUSCULAR; INTRAVENOUS EVERY 2 HOUR PRN
Status: DISCONTINUED | OUTPATIENT
Start: 2022-05-09 | End: 2022-05-10

## 2022-05-09 RX ORDER — SODIUM CHLORIDE, SODIUM LACTATE, POTASSIUM CHLORIDE, CALCIUM CHLORIDE 600; 310; 30; 20 MG/100ML; MG/100ML; MG/100ML; MG/100ML
INJECTION, SOLUTION INTRAVENOUS CONTINUOUS
Status: DISCONTINUED | OUTPATIENT
Start: 2022-05-09 | End: 2022-05-09 | Stop reason: HOSPADM

## 2022-05-09 RX ORDER — DOCUSATE SODIUM 100 MG/1
100 CAPSULE, LIQUID FILLED ORAL 2 TIMES DAILY
Status: DISCONTINUED | OUTPATIENT
Start: 2022-05-09 | End: 2022-05-10

## 2022-05-09 RX ORDER — ROCURONIUM BROMIDE 10 MG/ML
INJECTION, SOLUTION INTRAVENOUS AS NEEDED
Status: DISCONTINUED | OUTPATIENT
Start: 2022-05-09 | End: 2022-05-09 | Stop reason: SURG

## 2022-05-09 RX ORDER — HYDROMORPHONE HYDROCHLORIDE 1 MG/ML
0.2 INJECTION, SOLUTION INTRAMUSCULAR; INTRAVENOUS; SUBCUTANEOUS EVERY 5 MIN PRN
Status: DISCONTINUED | OUTPATIENT
Start: 2022-05-09 | End: 2022-05-09 | Stop reason: HOSPADM

## 2022-05-09 RX ORDER — MORPHINE SULFATE 2 MG/ML
1 INJECTION, SOLUTION INTRAMUSCULAR; INTRAVENOUS EVERY 2 HOUR PRN
Status: DISCONTINUED | OUTPATIENT
Start: 2022-05-09 | End: 2022-05-10

## 2022-05-09 RX ORDER — SODIUM PHOSPHATE, DIBASIC AND SODIUM PHOSPHATE, MONOBASIC 7; 19 G/133ML; G/133ML
1 ENEMA RECTAL ONCE AS NEEDED
Status: DISCONTINUED | OUTPATIENT
Start: 2022-05-09 | End: 2022-05-10

## 2022-05-09 RX ORDER — PHENYLEPHRINE HCL 10 MG/ML
VIAL (ML) INJECTION AS NEEDED
Status: DISCONTINUED | OUTPATIENT
Start: 2022-05-09 | End: 2022-05-09 | Stop reason: SURG

## 2022-05-09 RX ORDER — MORPHINE SULFATE 2 MG/ML
2 INJECTION, SOLUTION INTRAMUSCULAR; INTRAVENOUS EVERY 2 HOUR PRN
Status: DISCONTINUED | OUTPATIENT
Start: 2022-05-09 | End: 2022-05-10

## 2022-05-09 RX ORDER — ONDANSETRON 2 MG/ML
INJECTION INTRAMUSCULAR; INTRAVENOUS AS NEEDED
Status: DISCONTINUED | OUTPATIENT
Start: 2022-05-09 | End: 2022-05-09 | Stop reason: SURG

## 2022-05-09 RX ORDER — SODIUM CHLORIDE AND POTASSIUM CHLORIDE .9; .15 G/100ML; G/100ML
SOLUTION INTRAVENOUS CONTINUOUS
Status: DISCONTINUED | OUTPATIENT
Start: 2022-05-09 | End: 2022-05-10

## 2022-05-09 RX ORDER — CEFAZOLIN SODIUM/WATER 2 G/20 ML
2 SYRINGE (ML) INTRAVENOUS ONCE
Status: COMPLETED | OUTPATIENT
Start: 2022-05-09 | End: 2022-05-09

## 2022-05-09 RX ORDER — PROCHLORPERAZINE EDISYLATE 5 MG/ML
10 INJECTION INTRAMUSCULAR; INTRAVENOUS EVERY 6 HOURS PRN
Status: DISCONTINUED | OUTPATIENT
Start: 2022-05-09 | End: 2022-05-10

## 2022-05-09 RX ORDER — TRIAMTERENE AND HYDROCHLOROTHIAZIDE 37.5; 25 MG/1; MG/1
1 CAPSULE ORAL DAILY
Status: DISCONTINUED | OUTPATIENT
Start: 2022-05-09 | End: 2022-05-10

## 2022-05-09 RX ORDER — ONDANSETRON 2 MG/ML
4 INJECTION INTRAMUSCULAR; INTRAVENOUS EVERY 4 HOURS PRN
Status: ACTIVE | OUTPATIENT
Start: 2022-05-09 | End: 2022-05-10

## 2022-05-09 RX ORDER — HYDROCODONE BITARTRATE AND ACETAMINOPHEN 10; 325 MG/1; MG/1
2 TABLET ORAL ONCE AS NEEDED
Status: DISCONTINUED | OUTPATIENT
Start: 2022-05-09 | End: 2022-05-09 | Stop reason: HOSPADM

## 2022-05-09 RX ORDER — HYDROCODONE BITARTRATE AND ACETAMINOPHEN 10; 325 MG/1; MG/1
1 TABLET ORAL EVERY 4 HOURS PRN
Status: DISCONTINUED | OUTPATIENT
Start: 2022-05-09 | End: 2022-05-10

## 2022-05-09 RX ORDER — BISACODYL 10 MG
10 SUPPOSITORY, RECTAL RECTAL
Status: DISCONTINUED | OUTPATIENT
Start: 2022-05-09 | End: 2022-05-10

## 2022-05-09 RX ORDER — BUPIVACAINE HYDROCHLORIDE AND EPINEPHRINE 2.5; 5 MG/ML; UG/ML
INJECTION, SOLUTION EPIDURAL; INFILTRATION; INTRACAUDAL; PERINEURAL AS NEEDED
Status: DISCONTINUED | OUTPATIENT
Start: 2022-05-09 | End: 2022-05-09 | Stop reason: HOSPADM

## 2022-05-09 RX ORDER — SENNOSIDES 8.6 MG
17.2 TABLET ORAL NIGHTLY
Status: DISCONTINUED | OUTPATIENT
Start: 2022-05-09 | End: 2022-05-10

## 2022-05-09 RX ORDER — HYDROMORPHONE HYDROCHLORIDE 1 MG/ML
0.4 INJECTION, SOLUTION INTRAMUSCULAR; INTRAVENOUS; SUBCUTANEOUS EVERY 5 MIN PRN
Status: DISCONTINUED | OUTPATIENT
Start: 2022-05-09 | End: 2022-05-09 | Stop reason: HOSPADM

## 2022-05-09 RX ORDER — ESTRADIOL 1 MG/1
1 TABLET ORAL DAILY
Status: DISCONTINUED | OUTPATIENT
Start: 2022-05-09 | End: 2022-05-10

## 2022-05-09 RX ORDER — PROCHLORPERAZINE EDISYLATE 5 MG/ML
5 INJECTION INTRAMUSCULAR; INTRAVENOUS EVERY 8 HOURS PRN
Status: DISCONTINUED | OUTPATIENT
Start: 2022-05-09 | End: 2022-05-09 | Stop reason: HOSPADM

## 2022-05-09 RX ORDER — CEFAZOLIN SODIUM/WATER 2 G/20 ML
2 SYRINGE (ML) INTRAVENOUS EVERY 8 HOURS
Status: COMPLETED | OUTPATIENT
Start: 2022-05-09 | End: 2022-05-10

## 2022-05-09 RX ADMIN — DEXAMETHASONE SODIUM PHOSPHATE 8 MG: 4 MG/ML VIAL (ML) INJECTION at 10:10:00

## 2022-05-09 RX ADMIN — CEFAZOLIN SODIUM/WATER 2 G: 2 G/20 ML SYRINGE (ML) INTRAVENOUS at 10:16:00

## 2022-05-09 RX ADMIN — ONDANSETRON 4 MG: 2 INJECTION INTRAMUSCULAR; INTRAVENOUS at 11:05:00

## 2022-05-09 RX ADMIN — SODIUM CHLORIDE, SODIUM LACTATE, POTASSIUM CHLORIDE, CALCIUM CHLORIDE: 600; 310; 30; 20 INJECTION, SOLUTION INTRAVENOUS at 11:21:00

## 2022-05-09 RX ADMIN — LIDOCAINE HYDROCHLORIDE 50 MG: 10 INJECTION, SOLUTION EPIDURAL; INFILTRATION; INTRACAUDAL; PERINEURAL at 10:10:00

## 2022-05-09 RX ADMIN — PHENYLEPHRINE HCL 100 MCG: 10 MG/ML VIAL (ML) INJECTION at 10:45:00

## 2022-05-09 RX ADMIN — ROCURONIUM BROMIDE 20 MG: 10 INJECTION, SOLUTION INTRAVENOUS at 10:10:00

## 2022-05-09 NOTE — BRIEF OP NOTE
Pre-Operative Diagnosis: Presence of intrathecal pump [Z97.8]     Post-Operative Diagnosis: Presence of intrathecal pump [Z97.8]      Procedure Performed:   REPLACEMENT OF INTRATHECAL MORPHINE PUMP WITH INITIAL FILLING AND ALL INDICATED PROCEDURES    Surgeon(s) and Role:     * Priya Bustillo MD - Primary    Assistant(s):  Surgical Assistant.: Lázaro Blackwell     Surgical Findings: see dictation     Specimen: none     Estimated Blood Loss: 1 ml        Rahul Farfan MD  5/9/2022  11:21 AM

## 2022-05-09 NOTE — OPERATIVE REPORT
Operative Note    Patient Name: Maurilio Alcaraz    Preoperative Diagnosis: Presence of intrathecal pump [Z97.8]    Postoperative Diagnosis: same    Primary Surgeon: John Linares MD    Assistant: Irvin Lauren CSA, was essential the case including opening, closing and retraction    Procedures: Replacement of morphine pump and initial filling and interrogation    Surgical Findings: See dictation    Anesthesia: General    Complications: none    Implants: Medtronic 40 ml pump    Specimen: None    Drains: None    Condition: Stable    Estimated Blood Loss: 1 mL    Indication for Procedure: 57-year-old female with a history of morphine pump for pain management. Patient pump was near the end of life. Patient was seen in clinic. Patient was then plan for replacement of morphine pump. Procedure: Patient prep identified brought back to the OR 16. Patient placed on the OR table. Patient placed under general anesthesia by anesthesia staff. Patient position and all pressure points padded. Patient was then sterilely prepped and draped in usual fashion. 10 mils of quarter percent Marcaine with epinephrine was given local anesthetic. Incision was made dissection down the pocket. The pocket was opened. The previous pump was then dissected free. The pump was disconnected and there was nice flow from the catheter. 1 mL was removed from the catheter. The new pump was filled. The pump was then attached to the catheter. The pump was then placed into the abdomen. The pump was tacked down. The wound was then closed in layers. Patient was taken off the OR table. Patient awakened by anesthesia.     Medtronic 40 mL pump, 10 mg/mL of morphine, 3.59 mg/ day, ptm 0.2mg, q 2 hrs, max 4

## 2022-05-09 NOTE — ANESTHESIA PROCEDURE NOTES
Airway  Date/Time: 5/9/2022 10:12 AM  Urgency: elective      General Information and Staff    Patient location during procedure: OR  Anesthesiologist: sOcar Anderson MD  Performed: anesthesiologist     Indications and Patient Condition  Indications for airway management: anesthesia  Sedation level: deep  Preoxygenated: yes  Patient position: sniffing  Mask difficulty assessment: 1 - vent by mask    Final Airway Details  Final airway type: endotracheal airway      Successful airway: ETT  Cuffed: yes   Successful intubation technique: direct laryngoscopy  Endotracheal tube insertion site: oral  Blade: Maris  Blade size: #4  ETT size (mm): 7.0    Cormack-Lehane Classification: grade IIA - partial view of glottis  Placement verified by: chest auscultation and capnometry   Measured from: lips  ETT to lips (cm): 21  Number of attempts at approach: 1

## 2022-05-09 NOTE — PLAN OF CARE
Problem: PAIN - ADULT  Goal: Verbalizes/displays adequate comfort level or patient's stated pain goal  Description: INTERVENTIONS:  - Encourage pt to monitor pain and request assistance  - Assess pain using appropriate pain scale  - Administer analgesics based on type and severity of pain and evaluate response  - Implement non-pharmacological measures as appropriate and evaluate response  - Consider cultural and social influences on pain and pain management  - Manage/alleviate anxiety  - Utilize distraction and/or relaxation techniques  - Monitor for opioid side effects  - Notify MD/LIP if interventions unsuccessful or patient reports new pain  - Anticipate increased pain with activity and pre-medicate as appropriate  Outcome: Progressing     Problem: SAFETY ADULT - FALL  Goal: Free from fall injury  Description: INTERVENTIONS:  - Assess pt frequently for physical needs  - Identify cognitive and physical deficits and behaviors that affect risk of falls.   - [place] fall precautions as indicated by assessment.  - Educate pt/family on patient safety including physical limitations  - Instruct pt to call for assistance with activity based on assessment  - Modify environment to reduce risk of injury  - Provide assistive devices as appropriate  - Consider OT/PT consult to assist with strengthening/mobility  - Encourage toileting schedule  Outcome: Progressing

## 2022-05-09 NOTE — PROGRESS NOTES
NURSING ADMISSION NOTE      Patient admitted via Cart/bed from PACU post REPLACEMENT OF INTRATHECAL MORPHINE PUMP by Dr. Esteban Adam. Oriented to room. Received awake, alert and oriented x 4. Safety precautions initiated. Assisted to the bathroom with standby assistance and made comfortable in bed. Bed in low position. Discussed post surgical orders, verbalized understanding. Call light in reach. Reinforce instructions about \"call and don't fall\" safety policy. Dr. Esteban Adam re-ordered patient's home medications. No consult for hospitalist.  Patient's pain pump is at the left upper abdominal quadrant, incision with small coverlet dressing, clean, dry and intact. Vital signs taken and body assessment done.

## 2022-05-09 NOTE — H&P
Neurosurgery History & Physical Examination    Patient Name: Justen Sousa  MRN: [id number]  CSN: [id number]  YOB: 1965    Diagnosis: (Z97.8) Presence of intrathecal pump    Present Illness: Very pleasant patient presents today for planned replacement of intrathecal pain pump. Pt denies chest pain, shortness of breath, fever, or cough. Pt denies changes in HPI since her LOV. Pt states previous pain pump inserted due to failed back syndrome. Previous HPI 4/19:  Justen Sousa is a(n) 60year old female here for replacement of her morphine pump. Her pump was placed in 2016. She had revision due to her catheter recoiling. She been doing well. She did have a pump change next year. She is recently seen pain management. Refilling of the bed and high residual volumes. The question is why not her pump is working correctly. She is also having issues with her PTM doses. She is referred for replacement of her pump. She did get a thoracic CT scan. Has been NPO: Yes  Does not take ASA, Plavix, or other anticoagulants. Denies recent illness such as fevers or GI symptoms. Labs from 5/6/2022 reviewed: BMP, CBC, PT/INR all WNL.     acetaminophen (Tylenol Extra Strength) tab 1,000 mg, 1,000 mg, Oral, Once  lactated ringers infusion, , Intravenous, Continuous  ceFAZolin (ANCEF/KEFZOL) 2 GM/20ML premix IV syringe 2 g, 2 g, Intravenous, Once        Allergies:   Shellfish-Derived P*    TONGUE SWELLING  Cyclobenzaprine         NAUSEA AND VOMITING    Comment:Upset stomach  Latex                   OTHER (SEE COMMENTS)    Comment:blisters  Tizanidine              NAUSEA AND VOMITING  Baclofen                NAUSEA AND VOMITING    Past Medical History:   Diagnosis Date    Anxiety state, unspecified     Arthritis     Back problem     Borderline diabetic     Chronic pain     Depression     Failed back syndrome     per [employer]:  \"degenerative disc disease and failed back syndrome\"    Fibromyalgia Herniated disc     High blood pressure     MRSA (methicillin resistant staph aureus) culture positive     Neuropathy     hands, left leg worse than right    Osteoarthritis     everywhere including tail bone    Prediabetes     borderline    Raynaud disease     Screening for human papillomavirus (HPV) 07/11/2016    normal    Visual impairment     contacts/glasses     Past Surgical History:   Procedure Laterality Date    ANESTH,SKIN SURGERY,NECK      APPENDECTOMY      BACK SURGERY      multiple    CARPAL TUNNEL RELEASE      bilateral    HAND/FINGER SURGERY UNLISTED      left hand    HIT PAIN IMP PUMP DONNY      HYSTERECTOMY      KNEE SURGERY      right knee    OTHER      anterior removal of cervical instrumentation; C3-4 anterior cervical discectomy and fusion, with structural allograft and plate instrumentation    OTHER  2009,2014?    2 cervical neck surgeries    OTHER  10/26/16    intrathecal morphine pump place per Dr. Andrea Ramirez      per [employer]:  \"Mechanical complication of Nervous System device & implant graft. \"    OTHER SURGICAL HISTORY      per [employer]:  'Oopherectomy\"    OTHER SURGICAL HISTORY  1994    per [employer]:  \"Lumbar hemilaminectomy L3-4 on the left\"    OTHER SURGICAL HISTORY  1999    per [employer]:  \"L2, L3 partial laminectomy\"    OTHER SURGICAL HISTORY  1999    per [employer]:  \"Placement of intrathecal morphine pump through rt. \"    [place]    per [employer]:  \"low back surgeries x's 8\"    OTHER SURGICAL HISTORY  2010    per [employer]:  \"left shoulder surgery x's 2\"    OTHER SURGICAL HISTORY  2010    per [employer]:  \"ACDF\":    OTHER SURGICAL HISTORY  2012    per [employer]:  \"removal of pain med pump\"    OTHER SURGICAL HISTORY      per [employer]:  \"Arthrocentesis of the left shoulder joint\" x2    OTHER SURGICAL HISTORY      per [employer]:  \"Arthrocentesis of the left shoulder bicipital tendon\"    REVISE ULNAR NERVE AT ELBOW      left     Family History   Problem Relation Age of Onset    Cancer Mother         Lung cancer    Diabetes Mother     Arthritis Mother     Other (Other) Mother         shi's syndrom/CREST syndrome    Alcohol and Other Disorders Associated Brother     Stroke Brother     Other (gout) Father     Other (RA) Maternal Uncle     Diabetes Other         Family h/o Diabetes mellitus    Other (Other) Other         Family h    Other (RA) Maternal Grandmother     Other (RA) Paternal Grandmother      Social History    Tobacco Use      Smoking status: Former Smoker        Packs/day: 0.50        Years: 10.00        Pack years: 5        Quit date: 1/1/2012        Years since quitting: 10.3      Smokeless tobacco: Never Used    Alcohol use: No      Alcohol/week: 0.0 standard drinks      SYSTEM Check if Review is Normal Check if Physical Exam is Normal If not normal, please explain:   HEENT [X] [X]    NECK & BACK [ ] [ ] See HPI   HEART [X] [X]    LUNGS [X] [X]    ABDOMEN [X] [X]    UROGENITAL [ ] [ ] deferred   EXTREMITIES [X] [X]      Neurological Exam:  Mental status: Oriented to person, place, and time   Speech: Fluent, no dysarthria  CN: II-XII grossly intact  Memory and comprehension: Intact   Motor: No drift, no focal arm or leg weakness. Sensory: Intact to light touch    The above referenced H&P was reviewed by Madeline Nix NP on 5/9/2022. The patient was examined and no significant changes have occurred in the patient's condition since the H&P was performed. Dr. Ledell Cheadle reviewed with the patient and/or legal representative the potential benefits, risks and side effects of this procedure; the likelihood of the patient achieving goals; and potential problems that might occur during recuperation. Dr. Ledell Cheadle reviewed reasonable alternatives to the procedure, including risks, benefits and side effects related to the alternatives and risks related to not receiving this procedure. We will proceed with procedure as planned.        Madeline Nix CECILE  5/9/2022, 8:54 AM  [facility] [phone]

## 2022-05-10 VITALS
HEART RATE: 64 BPM | OXYGEN SATURATION: 94 % | WEIGHT: 98.75 LBS | TEMPERATURE: 98 F | SYSTOLIC BLOOD PRESSURE: 95 MMHG | RESPIRATION RATE: 18 BRPM | DIASTOLIC BLOOD PRESSURE: 56 MMHG | HEIGHT: 64 IN | BODY MASS INDEX: 16.86 KG/M2

## 2022-05-10 PROCEDURE — 97161 PT EVAL LOW COMPLEX 20 MIN: CPT

## 2022-05-10 PROCEDURE — 97530 THERAPEUTIC ACTIVITIES: CPT

## 2022-05-10 RX ORDER — HYDROCODONE BITARTRATE AND ACETAMINOPHEN 10; 325 MG/1; MG/1
1-2 TABLET ORAL EVERY 4 HOURS PRN
Qty: 90 TABLET | Refills: 0 | Status: SHIPPED | OUTPATIENT
Start: 2022-05-10

## 2022-05-10 NOTE — PROGRESS NOTES
NURSING DISCHARGE NOTE    Discharged Home via Wheelchair. Accompanied by Support staff  Belongings Taken by patient/family. Discharge instructions discussed with patient, verbalized understanding.

## 2022-05-10 NOTE — OCCUPATIONAL THERAPY NOTE
Received order for OT evaluation. Met with the patient. She is performing ADL independently. No concerns about going home. OT will sign off.

## 2022-05-10 NOTE — PLAN OF CARE
Aox4, patient reporting numbness to Left foot that is chronic, denying tingling to BLE, intrathecal pump palpable on abdomen, coverlet in place c/d/I, on room air , scds in place, teds, pain controlled at this time, up standby assist, PT/OT working with patient, no further needs at this time, will continue to monitor.

## 2022-05-10 NOTE — PLAN OF CARE
Problem: PAIN - ADULT  Goal: Verbalizes/displays adequate comfort level or patient's stated pain goal  Description: INTERVENTIONS:  - Encourage pt to monitor pain and request assistance  - Assess pain using appropriate pain scale  - Administer analgesics based on type and severity of pain and evaluate response  - Implement non-pharmacological measures as appropriate and evaluate response  - Consider cultural and social influences on pain and pain management  - Manage/alleviate anxiety  - Utilize distraction and/or relaxation techniques  - Monitor for opioid side effects  - Notify MD/LIP if interventions unsuccessful or patient reports new pain  - Anticipate increased pain with activity and pre-medicate as appropriate  Outcome: Adequate for Discharge     Problem: SAFETY ADULT - FALL  Goal: Free from fall injury  Description: INTERVENTIONS:  - Assess pt frequently for physical needs  - Identify cognitive and physical deficits and behaviors that affect risk of falls. - [place] fall precautions as indicated by assessment.  - Educate pt/family on patient safety including physical limitations  - Instruct pt to call for assistance with activity based on assessment  - Modify environment to reduce risk of injury  - Provide assistive devices as appropriate  - Consider OT/PT consult to assist with strengthening/mobility  - Encourage toileting schedule  Outcome: Adequate for Discharge   Patient up to the chair, reported pain level of 4.5-5 to surgical site. Left upper abdomen with pain pump, incision covered with small coverlet, looks clean and dry. Patient with chronic numbness to LLE,  no reported changes. Patient has been ambulating in the moore without any assistive device. Anticipate discharge to home once clear by surgeon.

## 2022-05-11 ENCOUNTER — TELEPHONE (OUTPATIENT)
Dept: SURGERY | Facility: CLINIC | Age: 57
End: 2022-05-11

## 2022-05-11 NOTE — TELEPHONE ENCOUNTER
Received Prior Auth from [facility]  PA has been initated with Kendra Vernon    Routed to prior-auth team

## 2022-06-01 ENCOUNTER — TELEPHONE (OUTPATIENT)
Dept: SURGERY | Facility: CLINIC | Age: 57
End: 2022-06-01

## 2022-06-01 ENCOUNTER — PATIENT MESSAGE (OUTPATIENT)
Dept: PHYSICAL MEDICINE AND REHAB | Facility: CLINIC | Age: 57
End: 2022-06-01

## 2022-06-01 RX ORDER — HYDROCODONE BITARTRATE AND ACETAMINOPHEN 10; 325 MG/1; MG/1
1-2 TABLET ORAL EVERY 4 HOURS PRN
Qty: 90 TABLET | Refills: 0 | Status: CANCELLED | OUTPATIENT
Start: 2022-06-01

## 2022-06-01 NOTE — TELEPHONE ENCOUNTER
From: Claudette Akhtar  To: Malinda Claude A. Bunnie Pac, MD  Sent: 6/1/2022 4:27 PM CDT  Subject: Prescription    J Luis Rabago, I meant to put in my June Refill medicine to [facility], I think Chevy Zamora came up in error, thank you Tamela Lynne

## 2022-06-01 NOTE — TELEPHONE ENCOUNTER
Refill Request    Medication request: HYDROcodone-acetaminophen  MG Oral Tab  Take 1 tablet by mouth every 6 (six) hours as needed for Pain. LOV: 4/5/22 televisit  Due back to clinic per last office note:  \"follow up in 6 months \"  NOV: 10/6/2022 Miguel Angel Champion MD      [place]: 5/6/22 #120    Urine drug screen (if applicable): none  Pain contract: Valid until 8/2/22    LOV plan (if weaning or changing medications): Per  at [address] [place] [address]: \"She will continue with the Robaxin and the Norco for the pain. \"      (Patient had her morphine pump replaced on 5/9/22 by Unruly Bhardwaj. )

## 2022-06-02 ENCOUNTER — TELEMEDICINE (OUTPATIENT)
Dept: SURGERY | Facility: CLINIC | Age: 57
End: 2022-06-02
Payer: MEDICARE

## 2022-06-02 DIAGNOSIS — M96.1 FAILED BACK SYNDROME OF LUMBAR SPINE: ICD-10-CM

## 2022-06-02 DIAGNOSIS — Z97.8 PRESENCE OF INTRATHECAL PUMP: Primary | ICD-10-CM

## 2022-06-02 PROCEDURE — 99024 POSTOP FOLLOW-UP VISIT: CPT | Performed by: PHYSICIAN ASSISTANT

## 2022-06-02 RX ORDER — HYDROCODONE BITARTRATE AND ACETAMINOPHEN 10; 325 MG/1; MG/1
1 TABLET ORAL EVERY 6 HOURS PRN
Qty: 120 TABLET | Refills: 0 | Status: SHIPPED | OUTPATIENT
Start: 2022-06-05 | End: 2022-07-05

## 2022-06-02 NOTE — PROGRESS NOTES
Neurosurgery Televisit  2022    Benito Herbert PCP:  Jeane Owen MD    10/21/1965 MRN [id number]       CC:  S/P IT Pump Replacement 22    HPI:    Gold Diamond is recovering well from surgery but developed recent URI. No problems with the incision, no drainage. She feels her IT pain pump is working better than before. Denies headache, fever, chills. She is very happy with surgery. PE deferred due to televisit    Incision was evaluated on video and has healed nicely without erythema    A/P:      1. Presence of intrathecal pump    2. Failed back syndrome of lumbar spine      Gold Diamond is recovering well. F/u prn, call if issues with the incision. Continue f/u with the pain service for IT Pain Pump management. Benito Godinez verbally consents to a [employer] on 22. Patient understands and accepts financial responsibility for any deductible, co-insurance and/or co-pays associated with this service.     ALEXEY Denny  2022  1:41 PM

## 2022-06-06 ENCOUNTER — PATIENT MESSAGE (OUTPATIENT)
Dept: PHYSICAL MEDICINE AND REHAB | Facility: CLINIC | Age: 57
End: 2022-06-06

## 2022-06-06 NOTE — TELEPHONE ENCOUNTER
From: Laura Sarmiento  Sent: 6/6/2022 10:33 AM CDT  To: [facility] Nurse  Subject: Prescription    Hi Vernal Prey, could you ask Dr Diane Dan to refill my prescription request, thanks Elizabeth Pavon

## 2022-06-08 ENCOUNTER — TELEPHONE (OUTPATIENT)
Dept: PAIN CLINIC | Facility: CLINIC | Age: 57
End: 2022-06-08

## 2022-06-08 DIAGNOSIS — M96.1 FAILED BACK SYNDROME OF LUMBAR SPINE: ICD-10-CM

## 2022-06-08 NOTE — TELEPHONE ENCOUNTER
ITP REFILL due by 8/3/22. Please place order for ITP Rx.   Last medication ordered:  morphINE (PF) 400 mg in sodium chloride (PF) 40 mL IT infusion    OV to be scheduled 7/29 w/ GLYNN Barton or 8/1 w/ Eliud Scruggs

## 2022-06-13 NOTE — TELEPHONE ENCOUNTER
Rx faxed to . Fax confirmation received. Rx hardcopy mailed to pharmacy.  emailed with upcoming refill information.  notified of refill date.

## 2022-06-14 NOTE — TELEPHONE ENCOUNTER
Medication request: Norco 10-325mg q6h prn pain    LOV 9/30/2019  NOV 9/21/20    [place] 7/2/20 #120, filled 2 days early.  Should have been filled on 7/4/20 2

## 2022-06-30 NOTE — TELEPHONE ENCOUNTER
Refill Request    Medication request: HYDROcodone-acetaminophen  MG Oral Tab Take 1 tablet by mouth every 6 (six) hours as needed for Pain. LOV: 4/5/22-televisit  Due back to clinic per last office note:  \"follow up in 6 months \"  NOV: 10/6/2022 Jeramie Liriano MD      [place]: 6/6/22 #120    Urine drug screen (if applicable): none  Pain contract: Valid until 8/2/22    LOV plan (if weaning or changing medications): Per  at [address] [place] [address]: \"She will continue with the Robaxin and the Norco for the pain. \"

## 2022-07-01 RX ORDER — HYDROCODONE BITARTRATE AND ACETAMINOPHEN 10; 325 MG/1; MG/1
1 TABLET ORAL EVERY 6 HOURS PRN
Qty: 120 TABLET | Refills: 0 | Status: SHIPPED | OUTPATIENT
Start: 2022-07-06 | End: 2022-08-05

## 2022-07-29 ENCOUNTER — OFFICE VISIT (OUTPATIENT)
Dept: PAIN CLINIC | Facility: CLINIC | Age: 57
End: 2022-07-29
Payer: MEDICARE

## 2022-07-29 VITALS
WEIGHT: 98 LBS | DIASTOLIC BLOOD PRESSURE: 68 MMHG | SYSTOLIC BLOOD PRESSURE: 112 MMHG | BODY MASS INDEX: 17 KG/M2 | HEART RATE: 86 BPM | OXYGEN SATURATION: 98 %

## 2022-07-29 DIAGNOSIS — Z97.8 PRESENCE OF INTRATHECAL PUMP: Primary | ICD-10-CM

## 2022-07-29 DIAGNOSIS — Z98.1 S/P CERVICAL SPINAL FUSION: ICD-10-CM

## 2022-07-29 NOTE — PROGRESS NOTES
Timeout completed prior to procedure @ 1378. Participants present for timeout:  Merlin Haggard PA, Nila Mars RN  , and patient.       Estimated amount:4.6ml    Actual amount:5.1ml

## 2022-08-01 NOTE — TELEPHONE ENCOUNTER
Refill Request    Medication request: HYDROcodone-acetaminophen  MG Oral Tab Take 1 tablet by mouth every 6 (six) hours as needed for Pain. LOV:4/5/22-televisit  Due back to clinic per last office note:  \"follow up in 6 months \"  NOV: 10/6/2022 Miguel Angel Champion MD -televisit     [place]: 7/6/22 #120    Urine drug screen (if applicable): none  Pain contract: Expires today 8/1/22    LOV plan (if weaning or changing medications): Per  at [address] [place] [address]: \"She will continue with the Robaxin and the [facility] for the pain. \"

## 2022-08-03 ENCOUNTER — TELEPHONE (OUTPATIENT)
Dept: PHYSICAL MEDICINE AND REHAB | Facility: CLINIC | Age: 57
End: 2022-08-03

## 2022-08-03 RX ORDER — HYDROCODONE BITARTRATE AND ACETAMINOPHEN 10; 325 MG/1; MG/1
1 TABLET ORAL EVERY 6 HOURS PRN
Qty: 120 TABLET | Refills: 0 | Status: SHIPPED | OUTPATIENT
Start: 2022-08-05 | End: 2022-09-04

## 2022-08-09 ENCOUNTER — MED REC SCAN ONLY (OUTPATIENT)
Dept: PHYSICAL MEDICINE AND REHAB | Facility: CLINIC | Age: 57
End: 2022-08-09

## 2022-09-01 ENCOUNTER — TELEPHONE (OUTPATIENT)
Dept: PAIN CLINIC | Facility: CLINIC | Age: 57
End: 2022-09-01

## 2022-09-01 DIAGNOSIS — M96.1 FAILED BACK SYNDROME OF LUMBAR SPINE: ICD-10-CM

## 2022-09-01 NOTE — TELEPHONE ENCOUNTER
ITP REFILL due by 10/23/2022. Please place order for ITP Rx.   Last medication ordered:  morphINE (PF) 400 mg in sodium chloride (PF) 40 mL IT infusion    OV scheduled 10/19/2022

## 2022-09-02 RX ORDER — HYDROCODONE BITARTRATE AND ACETAMINOPHEN 10; 325 MG/1; MG/1
1 TABLET ORAL EVERY 6 HOURS PRN
Qty: 120 TABLET | Refills: 0 | Status: SHIPPED | OUTPATIENT
Start: 2022-09-02 | End: 2022-10-02

## 2022-09-02 NOTE — TELEPHONE ENCOUNTER
Refill Request    Medication request: HYDROcodone-acetaminophen  MG Oral Tab. Take 1 tablet by mouth every 6 (six) hours as needed for Pain. LOV: 2022 Irish Smith M.D. Due back to clinic per last office note: Per Dr. Morena Lindquist: Cristine Luu will follow up in 6 months or sooner if needed. \"  NOV: 10/6/2022 Susannah Soliman MD      [facility]/Last refill: 2022 #120    Urine drug screen (if applicable): none  Pain contract:  on 2022    LOV plan (if weaning or changing medications): Per Dr. Morena Lindquist: Cristine Luu will continue with the Robaxin and the Norco for the pain. \"

## 2022-09-28 NOTE — TELEPHONE ENCOUNTER
Refill Request    Medication request: HYDROcodone-acetaminophen  MG Oral Tab Take 1 tablet by mouth every 6 (six) hours as needed for Pain. LOV: 22-televisit   Due back to clinic per last office note:  \"follow up in 6 months \"  NOV: 10/6/2022 Kylah Kerns MD -televisit     [place] 22 #120    Urine drug screen (if applicable): none  Pain contract:  on 22    LOV plan (if weaning or changing medications): Per  at [address] [place] [address]: \"She will continue with the Robaxin and the Norco for the pain. \"

## 2022-09-29 RX ORDER — HYDROCODONE BITARTRATE AND ACETAMINOPHEN 10; 325 MG/1; MG/1
1 TABLET ORAL EVERY 6 HOURS PRN
Qty: 120 TABLET | Refills: 0 | Status: SHIPPED | OUTPATIENT
Start: 2022-10-02 | End: 2022-11-01

## 2022-10-06 NOTE — PLAN OF CARE
Problem: Impaired Activities of Daily Living  Goal: Achieve highest/safest level of independence in self care  Interventions:  - Assess ability and encourage patient to participate in ADLs to maximize function  - Promote sitting position while performing A (1) More than 48 hours/None

## 2022-10-19 ENCOUNTER — OFFICE VISIT (OUTPATIENT)
Dept: PAIN CLINIC | Facility: CLINIC | Age: 57
End: 2022-10-19
Payer: MEDICARE

## 2022-10-19 VITALS — SYSTOLIC BLOOD PRESSURE: 98 MMHG | OXYGEN SATURATION: 96 % | DIASTOLIC BLOOD PRESSURE: 52 MMHG | HEART RATE: 87 BPM

## 2022-10-19 DIAGNOSIS — Z97.8 PRESENCE OF INTRATHECAL PUMP: Primary | ICD-10-CM

## 2022-10-19 NOTE — PROGRESS NOTES
Timeout completed prior to procedure @ 1508. Participants present for timeout: David MAKI, RN Fernie López, and patient. Expected Waste Amt: 4.5 mL    Actual Waste Amt: 6 mL      Pt reports increased pain and is requesting an increase in her daily dose.

## 2022-10-19 NOTE — PROGRESS NOTES
Patient presents in office today with reported pain in back    Current pain level reported = 3.5/10    Last reported dose of ITP      Narcotic Contract renewal n/a    Urine Drug screen n/a

## 2022-10-31 RX ORDER — HYDROCODONE BITARTRATE AND ACETAMINOPHEN 10; 325 MG/1; MG/1
1 TABLET ORAL EVERY 6 HOURS PRN
Qty: 120 TABLET | Refills: 0 | Status: SHIPPED | OUTPATIENT
Start: 2022-11-02 | End: 2022-12-02

## 2022-10-31 NOTE — TELEPHONE ENCOUNTER
Refill Request    Medication request: HYDROcodone-acetaminophen  MG Oral Tab Take 1 tablet by mouth every 6 (six) hours as needed for Pain. LOV: 10/6/22- televisit  Due back to clinic per last office note:  \"Follow-up:  6 months\"  NOV: Visit date not found      [place]: 10/3/22 #120    Urine drug screen (if applicable): none  Pain contract:  on 22    LOV plan (if weaning or changing medications): Per  at [address] [place] [address]: \"She is taking 3 of the Norco per day and 1500 mg of the Robaxin TID. \" \"She will continue with her current medications. \"      Message sent to patient via [employer] reminding her to schedule her f/u appt.

## 2022-11-30 RX ORDER — HYDROCODONE BITARTRATE AND ACETAMINOPHEN 10; 325 MG/1; MG/1
1 TABLET ORAL EVERY 6 HOURS PRN
Qty: 120 TABLET | Refills: 0 | Status: SHIPPED | OUTPATIENT
Start: 2022-12-02 | End: 2023-01-01

## 2022-11-30 NOTE — TELEPHONE ENCOUNTER
Refill Request    Medication request: HYDROcodone-acetaminophen  MG Oral TabTake 1 tablet by mouth every 6 (six) hours as needed for Pain. LOV:10/6/22 Telemedicine Visit  Due back to clinic per last office note:  \"Follow-up:  6 months\"  NOV: 4/10/2023 Ashley Wilson MD      [place] 22 #120    Urine drug screen (if applicable): none  Pain contract:  22   (RN added note to Davene Pod on 4/10/23 to obtain new PC and UDS)    LOV plan (if weaning or changing medications): Per Dr. Lesvia Butler note Ruslan Montague will continue with her current medications. \"

## 2022-12-01 ENCOUNTER — TELEPHONE (OUTPATIENT)
Dept: PAIN CLINIC | Facility: CLINIC | Age: 57
End: 2022-12-01

## 2022-12-01 DIAGNOSIS — M96.1 FAILED BACK SYNDROME OF LUMBAR SPINE: ICD-10-CM

## 2022-12-01 NOTE — TELEPHONE ENCOUNTER
ITP REFILL due by 1/13/2023. Please place order for ITP Rx.   Last medication ordered:  morphINE (PF) 400 mg in sodium chloride 0.9% (PF) 40 mL IT infusion    OV scheduled 1/11/2023

## 2023-01-04 RX ORDER — METHOCARBAMOL 750 MG/1
750 TABLET, FILM COATED ORAL 3 TIMES DAILY
Qty: 540 TABLET | Refills: 3 | Status: SHIPPED | OUTPATIENT
Start: 2023-01-04

## 2023-01-04 RX ORDER — HYDROCODONE BITARTRATE AND ACETAMINOPHEN 10; 325 MG/1; MG/1
1 TABLET ORAL EVERY 6 HOURS PRN
Qty: 120 TABLET | Refills: 0 | Status: SHIPPED | OUTPATIENT
Start: 2023-01-04 | End: 2023-02-03

## 2023-01-11 ENCOUNTER — OFFICE VISIT (OUTPATIENT)
Dept: PAIN CLINIC | Facility: CLINIC | Age: 58
End: 2023-01-11
Payer: MEDICARE

## 2023-01-11 VITALS — OXYGEN SATURATION: 94 % | DIASTOLIC BLOOD PRESSURE: 60 MMHG | SYSTOLIC BLOOD PRESSURE: 128 MMHG | HEART RATE: 90 BPM

## 2023-01-11 DIAGNOSIS — Z97.8 PRESENCE OF INTRATHECAL PUMP: ICD-10-CM

## 2023-01-11 DIAGNOSIS — M96.1 FAILED BACK SYNDROME OF LUMBAR SPINE: Primary | ICD-10-CM

## 2023-01-11 PROCEDURE — 62370 ANL SP INF PMP W/MDREPRG&FIL: CPT | Performed by: PHYSICIAN ASSISTANT

## 2023-01-11 NOTE — PROGRESS NOTES
Patient presents in office today with reported pain in normal pain    Current pain level reported = 3.5/10    Last reported dose of ITP      Narcotic Contract renewal n/a    Urine Drug screen n/a

## 2023-01-11 NOTE — PROGRESS NOTES
Timeout completed prior to procedure @ 2907. Participants present for timeout:  Reba MAKI, RN Franck Wang, and patient.       Expected Waste Amt: 4.2 mL    Actual Waste Amt: 5.5 mL

## 2023-01-30 ENCOUNTER — TELEPHONE (OUTPATIENT)
Dept: PHYSICAL MEDICINE AND REHAB | Facility: CLINIC | Age: 58
End: 2023-01-30

## 2023-01-30 RX ORDER — HYDROCODONE BITARTRATE AND ACETAMINOPHEN 10; 325 MG/1; MG/1
1 TABLET ORAL EVERY 6 HOURS PRN
Qty: 120 TABLET | Refills: 0 | Status: SHIPPED | OUTPATIENT
Start: 2023-02-04 | End: 2023-03-06

## 2023-01-30 RX ORDER — METHOCARBAMOL 750 MG/1
TABLET, FILM COATED ORAL
Qty: 540 TABLET | Refills: 3 | Status: SHIPPED | OUTPATIENT
Start: 2023-01-30

## 2023-02-01 NOTE — TELEPHONE ENCOUNTER
Per [facility] fax regarding patient's Methocarbamol: \"Your patients benefits plan does not cover the prescribed medication without a PA. Please consider Tizanidine tab as an alternative or obtain a PA. Will forward message on to  to advise if patient is to try alternate Tizanidine or if PA needs to be submitted for the methocarbamol.

## 2023-02-03 RX ORDER — METHOCARBAMOL 750 MG/1
TABLET, FILM COATED ORAL
Qty: 540 TABLET | Refills: 3 | Status: CANCELLED | OUTPATIENT
Start: 2023-02-03

## 2023-02-03 NOTE — TELEPHONE ENCOUNTER
Please check with the patient, but she might have taken this already. I not, she can take 4 mg TID. 29.1

## 2023-02-03 NOTE — TELEPHONE ENCOUNTER
Duplicate request. [employer] message sent to patient for update. Prior authorization completed.

## 2023-02-06 ENCOUNTER — TELEPHONE (OUTPATIENT)
Dept: PHYSICAL MEDICINE AND REHAB | Facility: CLINIC | Age: 58
End: 2023-02-06

## 2023-02-10 ENCOUNTER — TELEPHONE (OUTPATIENT)
Dept: PAIN CLINIC | Facility: CLINIC | Age: 58
End: 2023-02-10

## 2023-02-10 ENCOUNTER — PATIENT MESSAGE (OUTPATIENT)
Dept: PHYSICAL MEDICINE AND REHAB | Facility: CLINIC | Age: 58
End: 2023-02-10

## 2023-02-10 DIAGNOSIS — M96.1 FAILED BACK SYNDROME OF LUMBAR SPINE: ICD-10-CM

## 2023-02-10 NOTE — TELEPHONE ENCOUNTER
ITP REFILL due by 4/7/23. Please place order for ITP Rx.   Last medication ordered:  morphINE (PF) 400 mg in sodium chloride 0.9% (PF) 40 mL IT infusion    OV scheduled 4/5/23

## 2023-02-13 ENCOUNTER — PATIENT MESSAGE (OUTPATIENT)
Dept: PHYSICAL MEDICINE AND REHAB | Facility: CLINIC | Age: 58
End: 2023-02-13

## 2023-02-13 RX ORDER — METHOCARBAMOL 750 MG/1
TABLET, FILM COATED ORAL
Qty: 180 TABLET | Refills: 0 | Status: SHIPPED | OUTPATIENT
Start: 2023-02-13

## 2023-02-13 RX ORDER — METHOCARBAMOL 750 MG/1
TABLET, FILM COATED ORAL
Qty: 540 TABLET | Refills: 3 | Status: SHIPPED | OUTPATIENT
Start: 2023-02-13

## 2023-02-13 NOTE — TELEPHONE ENCOUNTER
Per patient request:   \"Could you send one month to [facility] and the rest to [facility]? Thank you Angela\"    Rx's sent. 1 to [facility] and 1 month supply to local [facility] pharmacy. Nothing further needed at this time.

## 2023-02-13 NOTE — TELEPHONE ENCOUNTER
From: Quinn Felipe  To: Inez Simmons MD  Sent: 2/10/2023 6:42 PM CST  Subject: Prescription     Hi it's MARGARET Peña needs a new prescription for my medicine. The approval was approved by my insurance, now they need a new prescription order. I'm sorry about this. I wasn't notified they need approval. I have been out of my methcarbonal now for a week, so if Dr Mague Simmons can this this done asap they can fill the order and ship it to me. If you have any questions please call!  Thanks again

## 2023-02-13 NOTE — TELEPHONE ENCOUNTER
Methocarbamol was sent to [facility]'s [facility] on 1/30/23 #540 #-3. Message sent to patient via [employer] to see if a new rx needs to be [facility] or if she needs a 1 month supply sent to her local pharmacy. Awaiting patient response.

## 2023-02-13 NOTE — TELEPHONE ENCOUNTER
Methocarbamol rx 9 month supply re-sent to patient's [facility] mail order pharmacy. Since patient is already out of this a 1 month was sent to the local [address] pharmacy per patient request.    Message sent to patient via [employer].

## 2023-02-13 NOTE — TELEPHONE ENCOUNTER
From: Kenny Do  To: Lien Lindquist MD  Sent: 2/10/2023 3:18 PM CST  Subject: Prescription     Hi, Im sorry about this , Can Dr Morena Lindquist please put in a new Prescription for e today to [facility] , I have been out of this for a week now, finally the approval was made, and now [facility] is waiting on a new refill!  Thank you Audie Caro

## 2023-02-28 ENCOUNTER — PATIENT MESSAGE (OUTPATIENT)
Dept: PHYSICAL MEDICINE AND REHAB | Facility: CLINIC | Age: 58
End: 2023-02-28

## 2023-02-28 RX ORDER — HYDROCODONE BITARTRATE AND ACETAMINOPHEN 10; 325 MG/1; MG/1
1-2 TABLET ORAL EVERY 4 HOURS PRN
Qty: 90 TABLET | Refills: 0 | Status: CANCELLED | OUTPATIENT
Start: 2023-02-28

## 2023-02-28 NOTE — TELEPHONE ENCOUNTER
From: Diogo [facility]  To:  Office of CECILE Mejia  Sent: 2/27/2023 8:45 PM CST  Subject: Medication Renewal Request    Refills have been requested for the following medications:     HYDROcodone-acetaminophen  MG Oral Tab Mallory Darden]    Preferred pharmacy: [address] [place] [address],  [address], [address] [phone], [phone]

## 2023-03-03 RX ORDER — HYDROCODONE BITARTRATE AND ACETAMINOPHEN 10; 325 MG/1; MG/1
1 TABLET ORAL EVERY 6 HOURS PRN
Qty: 120 TABLET | Refills: 0 | Status: SHIPPED | OUTPATIENT
Start: 2023-03-06 | End: 2023-04-05

## 2023-03-29 RX ORDER — HYDROCODONE BITARTRATE AND ACETAMINOPHEN 10; 325 MG/1; MG/1
1 TABLET ORAL EVERY 6 HOURS PRN
Qty: 120 TABLET | Refills: 0 | Status: SHIPPED | OUTPATIENT
Start: 2023-03-29 | End: 2023-04-28

## 2023-03-29 NOTE — TELEPHONE ENCOUNTER
Refill Request    Medication request: HYDROcodone-acetaminophen  MG Oral Tab    LOV: 10/6/2022  RTC: 6 months  NOV: 2023 Jeramie Liriano MD      [facility]: [facility] #30 days    UDS: (if applicable): N/A  Pain contract:  2022    LOV plan (if weaning or changing medications):  She will continue with her current medications

## 2023-04-05 ENCOUNTER — OFFICE VISIT (OUTPATIENT)
Dept: PAIN CLINIC | Facility: CLINIC | Age: 58
End: 2023-04-05
Payer: MEDICARE

## 2023-04-05 VITALS
HEART RATE: 92 BPM | DIASTOLIC BLOOD PRESSURE: 58 MMHG | SYSTOLIC BLOOD PRESSURE: 112 MMHG | WEIGHT: 98 LBS | BODY MASS INDEX: 17 KG/M2 | OXYGEN SATURATION: 98 %

## 2023-04-05 DIAGNOSIS — Z97.8 PRESENCE OF INTRATHECAL PUMP: ICD-10-CM

## 2023-04-05 DIAGNOSIS — G89.29 OTHER CHRONIC PAIN: Primary | ICD-10-CM

## 2023-04-05 NOTE — PROGRESS NOTES
Timeout completed prior to procedure @ 4363. Participants present for timeout:  Anthony Armendariz, RN Alexander Yo, and patient.       Expected Waste Amt: 4.1 mL    Actual Waste Amt: 6.0 mL

## 2023-04-27 PROBLEM — R60.0 EDEMA OF RIGHT LOWER LEG: Status: ACTIVE | Noted: 2023-04-27

## 2023-05-02 ENCOUNTER — TELEPHONE (OUTPATIENT)
Dept: PAIN CLINIC | Facility: CLINIC | Age: 58
End: 2023-05-02

## 2023-05-02 DIAGNOSIS — M96.1 FAILED BACK SYNDROME OF LUMBAR SPINE: ICD-10-CM

## 2023-05-02 NOTE — TELEPHONE ENCOUNTER
ITP REFILL due by 6/30/2023. Please place order for ITP Rx.   Last medication ordered:  morphINE (PF) 400 mg in sodium chloride 0.9% (PF) 40 mL IT infusion    OV scheduled 6/28/2023

## 2023-05-10 ENCOUNTER — PATIENT MESSAGE (OUTPATIENT)
Dept: PHYSICAL MEDICINE AND REHAB | Facility: CLINIC | Age: 58
End: 2023-05-10

## 2023-05-10 RX ORDER — METHOCARBAMOL 750 MG/1
TABLET, FILM COATED ORAL
Qty: 540 TABLET | Refills: 3 | Status: SHIPPED | OUTPATIENT
Start: 2023-05-10

## 2023-05-10 NOTE — TELEPHONE ENCOUNTER
Refill Request    Medication request: methocarbamol 750 MG Oral Tab TAKE 2 TABLETS EVERY 8 HOURS AS NEEDED. GPJ: Miguel Angel Champion MD   Due back to clinic per last office note:  \"follow up in 6 months \"   NOV: Visit date not found      [place] 23 #540    Urine drug screen (if applicable): Ordered on 23-not completed  Pain contract:  on 22    LOV plan (if weaning or changing medications): Per  at LOV: \"Hydrocodone 10mg BID-TID, Methocarbamol 750mg TID\" \"The patient will continue with their current pain medications. \"

## 2023-05-10 NOTE — TELEPHONE ENCOUNTER
From: Jeff Torres  To: Elif Valadez MD  Sent: 5/10/2023 1:08 PM CDT  Subject: Prescription refill    Hi I called [facility] lissa to refill my Robaxin, it should be coming through, thank you, Sumi Ackerman if you have questions let me know

## 2023-05-30 ENCOUNTER — LAB ENCOUNTER (OUTPATIENT)
Dept: LAB | Age: 58
End: 2023-05-30
Attending: PHYSICAL MEDICINE & REHABILITATION
Payer: MEDICARE

## 2023-05-30 ENCOUNTER — HOSPITAL ENCOUNTER (OUTPATIENT)
Dept: ULTRASOUND IMAGING | Age: 58
Discharge: HOME | End: 2023-05-30
Attending: PHYSICAL MEDICINE & REHABILITATION
Payer: MEDICARE

## 2023-05-30 DIAGNOSIS — F11.10 OPIOID ABUSE (HCC): ICD-10-CM

## 2023-05-30 DIAGNOSIS — I10 ESSENTIAL HYPERTENSION, MALIGNANT: Primary | ICD-10-CM

## 2023-05-30 DIAGNOSIS — R60.0 EDEMA OF RIGHT LOWER LEG: ICD-10-CM

## 2023-05-30 LAB
ALBUMIN SERPL-MCNC: 3.9 G/DL (ref 3.4–5)
ALBUMIN/GLOB SERPL: 1.1 {RATIO} (ref 1–2)
ALP LIVER SERPL-CCNC: 55 U/L
ALT SERPL-CCNC: 28 U/L
AMPHET UR QL SCN: NEGATIVE
ANION GAP SERPL CALC-SCNC: 3 MMOL/L (ref 0–18)
AST SERPL-CCNC: 26 U/L (ref 15–37)
BARBITURATES UR QL SCN: NEGATIVE
BASOPHILS # BLD AUTO: 0.14 X10(3) UL (ref 0–0.2)
BASOPHILS NFR BLD AUTO: 1.7 %
BENZODIAZ UR QL SCN: NEGATIVE
BILIRUB SERPL-MCNC: 0.4 MG/DL (ref 0.1–2)
BUN BLD-MCNC: 16 MG/DL (ref 7–18)
BUN/CREAT SERPL: 20.3 (ref 10–20)
CALCIUM BLD-MCNC: 9.8 MG/DL (ref 8.5–10.1)
CANNABINOIDS UR QL SCN: NEGATIVE
CHLORIDE SERPL-SCNC: 99 MMOL/L (ref 98–112)
CHOLEST SERPL-MCNC: 184 MG/DL (ref ?–200)
CO2 SERPL-SCNC: 35 MMOL/L (ref 21–32)
COCAINE UR QL: NEGATIVE
CREAT BLD-MCNC: 0.79 MG/DL
CREAT UR-SCNC: 42.2 MG/DL
DEPRECATED RDW RBC AUTO: 42 FL (ref 35.1–46.3)
EOSINOPHIL # BLD AUTO: 0.31 X10(3) UL (ref 0–0.7)
EOSINOPHIL NFR BLD AUTO: 3.9 %
ERYTHROCYTE [DISTWIDTH] IN BLOOD BY AUTOMATED COUNT: 12.2 % (ref 11–15)
FASTING PATIENT LIPID ANSWER: YES
FASTING STATUS PATIENT QL REPORTED: YES
GFR SERPLBLD BASED ON 1.73 SQ M-ARVRAT: 86 ML/MIN/1.73M2 (ref 60–?)
GLOBULIN PLAS-MCNC: 3.7 G/DL (ref 2.8–4.4)
GLUCOSE BLD-MCNC: 103 MG/DL (ref 70–99)
HCT VFR BLD AUTO: 42.7 %
HDLC SERPL-MCNC: 89 MG/DL (ref 40–59)
HGB BLD-MCNC: 13.9 G/DL
IMM GRANULOCYTES # BLD AUTO: 0.02 X10(3) UL (ref 0–1)
IMM GRANULOCYTES NFR BLD: 0.2 %
LDLC SERPL CALC-MCNC: 78 MG/DL (ref ?–100)
LYMPHOCYTES # BLD AUTO: 2.37 X10(3) UL (ref 1–4)
LYMPHOCYTES NFR BLD AUTO: 29.5 %
MCH RBC QN AUTO: 30.2 PG (ref 26–34)
MCHC RBC AUTO-ENTMCNC: 32.6 G/DL (ref 31–37)
MCV RBC AUTO: 92.8 FL
MDMA UR QL SCN: NEGATIVE
METHADONE UR QL SCN: NEGATIVE
MONOCYTES # BLD AUTO: 0.51 X10(3) UL (ref 0.1–1)
MONOCYTES NFR BLD AUTO: 6.3 %
NEUTROPHILS # BLD AUTO: 4.69 X10 (3) UL (ref 1.5–7.7)
NEUTROPHILS # BLD AUTO: 4.69 X10(3) UL (ref 1.5–7.7)
NEUTROPHILS NFR BLD AUTO: 58.4 %
NONHDLC SERPL-MCNC: 95 MG/DL (ref ?–130)
OSMOLALITY SERPL CALC.SUM OF ELEC: 285 MOSM/KG (ref 275–295)
OXYCODONE UR QL SCN: NEGATIVE
PCP UR QL SCN: NEGATIVE
PLATELET # BLD AUTO: 269 10(3)UL (ref 150–450)
POTASSIUM SERPL-SCNC: 4.9 MMOL/L (ref 3.5–5.1)
PROT SERPL-MCNC: 7.6 G/DL (ref 6.4–8.2)
RBC # BLD AUTO: 4.6 X10(6)UL
SODIUM SERPL-SCNC: 137 MMOL/L (ref 136–145)
TRIGL SERPL-MCNC: 96 MG/DL (ref 30–149)
VLDLC SERPL CALC-MCNC: 15 MG/DL (ref 0–30)
WBC # BLD AUTO: 8 X10(3) UL (ref 4–11)

## 2023-05-30 PROCEDURE — 80307 DRUG TEST PRSMV CHEM ANLYZR: CPT

## 2023-05-30 PROCEDURE — 93971 EXTREMITY STUDY: CPT | Performed by: PHYSICAL MEDICINE & REHABILITATION

## 2023-05-30 PROCEDURE — 36415 COLL VENOUS BLD VENIPUNCTURE: CPT

## 2023-05-30 PROCEDURE — 85025 COMPLETE CBC W/AUTO DIFF WBC: CPT

## 2023-05-30 PROCEDURE — 80361 OPIATES 1 OR MORE: CPT

## 2023-05-30 PROCEDURE — 80061 LIPID PANEL: CPT

## 2023-05-30 PROCEDURE — 80053 COMPREHEN METABOLIC PANEL: CPT

## 2023-05-30 NOTE — TELEPHONE ENCOUNTER
Refill Request    Medication request: HYDROcodone-acetaminophen  MG Oral Tab    LOV: 4/27/2023 Paul Izquierdo MD   RTC: 6 months  NOV: Visit date not found      [place]: 5/4/2023 #30 days    UDS: (if applicable): N/A, active order for next appointment     Pain contract: Valid through 5/8/2024    LOV plan (if weaning or changing medications): The patient will continue with their current pain medications.

## 2023-05-31 ENCOUNTER — TELEPHONE (OUTPATIENT)
Dept: PHYSICAL MEDICINE AND REHAB | Facility: CLINIC | Age: 58
End: 2023-05-31

## 2023-05-31 ENCOUNTER — PATIENT MESSAGE (OUTPATIENT)
Dept: PHYSICAL MEDICINE AND REHAB | Facility: CLINIC | Age: 58
End: 2023-05-31

## 2023-05-31 NOTE — TELEPHONE ENCOUNTER
----- Message from Mari Hou MD sent at 5/31/2023  5:47 AM CDT -----  There is no blood clot. She will need to see her PCP about the leg swelling.

## 2023-06-01 RX ORDER — HYDROCODONE BITARTRATE AND ACETAMINOPHEN 10; 325 MG/1; MG/1
1 TABLET ORAL EVERY 6 HOURS PRN
Qty: 120 TABLET | Refills: 0 | Status: SHIPPED | OUTPATIENT
Start: 2023-06-03 | End: 2023-07-03

## 2023-06-01 NOTE — TELEPHONE ENCOUNTER
See refill request 5/27/23 for more information. Per Kelvin Myers last filled on 5/4/23 so refill will be due on 6/3/23.

## 2023-06-01 NOTE — TELEPHONE ENCOUNTER
From: Aruna Melgar  To: Alejo Moreau MD  Sent: 5/31/2023 8:35 PM CDT  Subject: Prescription     I would like to ask Dr Catherine Moreau if he could date my Prescription for 6/3, the 4th of June is on a Sunday and [address] is closed. Thank you Baljinder Johnson, I already put my refill request in my chart.  If you have questions please call me, happy belated Memorial Day

## 2023-06-05 LAB
CODEINE UR: NEGATIVE
HYDROCODONE CONF UR: 278 NG/ML
HYDROCODONE UR: POSITIVE
HYDROMORPH CONF UR: 223 NG/ML
HYDROMORPH UR: POSITIVE
MORPHINE CONF UR: 650 NG/ML
MORPHINE UR: POSITIVE
OPIATES CLASS UR: POSITIVE NG/ML

## 2023-06-21 NOTE — TELEPHONE ENCOUNTER
Spoke to pt and advised pt that her alarm date in exactly on 6/30/2023,pt states \"'oh is that my alam date I don't even know that's my alarm date. Pt states if she can reschedule it then by 6/29/2023. Reschedule pt to 6/29/2023 1pm with Kenia Johnson

## 2023-06-23 NOTE — TELEPHONE ENCOUNTER
ITP PUMP Medication received. Verified by Jessica Monae and Collin MAKI. Dose correct and medication stored in lock box.     Rx #: [id number]  LOT [email or web address]  Expiration date:  7/22/2023

## 2023-06-29 ENCOUNTER — OFFICE VISIT (OUTPATIENT)
Dept: PAIN CLINIC | Facility: CLINIC | Age: 58
End: 2023-06-29
Payer: MEDICARE

## 2023-06-29 VITALS
BODY MASS INDEX: 17 KG/M2 | OXYGEN SATURATION: 94 % | WEIGHT: 98 LBS | DIASTOLIC BLOOD PRESSURE: 56 MMHG | HEART RATE: 82 BPM | SYSTOLIC BLOOD PRESSURE: 108 MMHG

## 2023-06-29 DIAGNOSIS — Z97.8 PRESENCE OF INTRATHECAL PUMP: ICD-10-CM

## 2023-06-29 DIAGNOSIS — G89.29 OTHER CHRONIC PAIN: Primary | ICD-10-CM

## 2023-06-29 PROCEDURE — 62370 ANL SP INF PMP W/MDREPRG&FIL: CPT | Performed by: PHYSICIAN ASSISTANT

## 2023-06-30 RX ORDER — HYDROCODONE BITARTRATE AND ACETAMINOPHEN 10; 325 MG/1; MG/1
1 TABLET ORAL EVERY 6 HOURS PRN
Qty: 120 TABLET | Refills: 0 | Status: SHIPPED | OUTPATIENT
Start: 2023-06-30 | End: 2023-07-30

## 2023-07-28 NOTE — TELEPHONE ENCOUNTER
Refill Request    Medication request: HYDROcodone-acetaminophen  MG Oral Tab. Take 1 tablet by mouth every 6 (six) hours as needed for Pain. XRZ:5/56/3141 Ana Virk MD   Due back to clinic per last office note:  Per Dr Martha Mcbride will follow up in 6 months or sooner if needed. \"  NOV: Visit date not found      [place]: 07/03/2023 #120 tab (30 days)    Urine drug screen (if applicable): 27/72/0019  Pain contract: Expires 05/08/2024    LOV plan (if weaning or changing medications): Per Dr Monet Villatoro \"The patient will continue with their current pain medications.  \"

## 2023-07-28 NOTE — TELEPHONE ENCOUNTER
Refill Request    Medication request: methocarbamol 750 MG Oral Tab     LOV:4/27/2023 Barry Gibson MD   Due back to clinic per last office note:  6 months  NOV: Visit date not found      [place]: 5/10/23 #540, 90 day supply    Urine drug screen (if applicable): 5/21/33  Pain contract: Signed 4/27/23    LOV plan (if weaning or changing medications):

## 2023-08-02 RX ORDER — METHOCARBAMOL 750 MG/1
TABLET, FILM COATED ORAL
Qty: 540 TABLET | Refills: 3 | Status: SHIPPED | OUTPATIENT
Start: 2023-08-02

## 2023-08-02 RX ORDER — HYDROCODONE BITARTRATE AND ACETAMINOPHEN 10; 325 MG/1; MG/1
1 TABLET ORAL EVERY 6 HOURS PRN
Qty: 120 TABLET | Refills: 0 | Status: SHIPPED | OUTPATIENT
Start: 2023-08-02 | End: 2023-09-01

## 2023-08-10 ENCOUNTER — TELEPHONE (OUTPATIENT)
Dept: PAIN CLINIC | Facility: CLINIC | Age: 58
End: 2023-08-10

## 2023-08-10 DIAGNOSIS — M96.1 FAILED BACK SYNDROME OF LUMBAR SPINE: ICD-10-CM

## 2023-08-30 RX ORDER — HYDROCODONE BITARTRATE AND ACETAMINOPHEN 10; 325 MG/1; MG/1
1 TABLET ORAL EVERY 6 HOURS PRN
Qty: 120 TABLET | Refills: 0 | Status: SHIPPED | OUTPATIENT
Start: 2023-08-30 | End: 2023-09-29

## 2023-09-20 ENCOUNTER — OFFICE VISIT (OUTPATIENT)
Dept: PAIN CLINIC | Facility: CLINIC | Age: 58
End: 2023-09-20
Payer: MEDICARE

## 2023-09-20 VITALS — HEART RATE: 90 BPM | OXYGEN SATURATION: 93 % | SYSTOLIC BLOOD PRESSURE: 120 MMHG | DIASTOLIC BLOOD PRESSURE: 62 MMHG

## 2023-09-20 DIAGNOSIS — G89.29 OTHER CHRONIC PAIN: Primary | ICD-10-CM

## 2023-09-20 DIAGNOSIS — Z97.8 PRESENCE OF INTRATHECAL PUMP: ICD-10-CM

## 2023-09-20 PROCEDURE — 62370 ANL SP INF PMP W/MDREPRG&FIL: CPT | Performed by: PHYSICIAN ASSISTANT

## 2023-09-20 NOTE — PROGRESS NOTES
Timeout completed prior to procedure @ 8646. Participants present for timeout:  Louann MAKI, RN Franck Wang, and patient.     Expected Waste Amt: 4.4 mL    Actual Waste Amt: 7.8 mL

## 2023-09-20 NOTE — PROGRESS NOTES
Patient presents in office today with reported with chronic pain    Current pain level reported = 4/10    Last reported dose of ITP

## 2023-09-28 NOTE — TELEPHONE ENCOUNTER
Refill Request    Medication request: HYDROcodone-acetaminophen  MG Oral Tab. Take 1 tablet by mouth every 6 (six) hours as needed for Pain. WJL:4/24/6534 Masha Hughes MD   Due back to clinic per last office note: Per Dr. Eleanor West: Sergio Friedman will follow up in 6 months or sooner if needed. \"  NOV: 11/13/2023 Masha Hughes MD      [place]: 09/01/2023 #120  Earliest fill date: 10/01/23. Order changed to reflect this    Urine drug screen (if applicable): 30/48/5202  Pain contract: VALID - Expires 05/08/24    LOV plan (if weaning or changing medications): Per Dr. Eleanor West: \"The patient will continue with their current pain medications. \"

## 2023-09-29 RX ORDER — HYDROCODONE BITARTRATE AND ACETAMINOPHEN 10; 325 MG/1; MG/1
1 TABLET ORAL EVERY 6 HOURS PRN
Qty: 120 TABLET | Refills: 0 | Status: SHIPPED | OUTPATIENT
Start: 2023-09-30 | End: 2023-10-30

## 2023-10-30 RX ORDER — METHOCARBAMOL 750 MG/1
TABLET, FILM COATED ORAL
Qty: 540 TABLET | Refills: 3 | Status: SHIPPED | OUTPATIENT
Start: 2023-10-30

## 2023-10-30 NOTE — TELEPHONE ENCOUNTER
Refill Request    Medication request: HYDROcodone-acetaminophen  MG Oral Tab    Take 1 tablet by mouth every 6 (six) hours as needed for Pain. YQL:8/86/2931 Annette Gautam MD   Due back to clinic per last office note:  \"follow up in 6 months \"  Hank Bound: 11/13/2023 Annette Gautam MD      [place]: 10/2/23 #120    Urine drug screen (if applicable): 0/72/56  Pain contract: Valid until 5/8/24    LOV plan (if weaning or changing medications): Per  at [address] [place] [address]: \"The patient will continue with their current pain medications.  \"

## 2023-10-30 NOTE — TELEPHONE ENCOUNTER
Refill Request    Medication request: methocarbamol 750 MG Oral Tab  TAKE 2 TABLETS EVERY 8 HOURS AS NEEDED. [id number] Ana Virk MD   Due back to clinic per last office note:  \"follow up in 6 months \"  Luciano Plum: 2023 Ana Virk MD      [place]: 23 #540-90 day supply    Urine drug screen (if applicable):   Pain contract: Valid until 24    LOV plan (if weaning or changing medications): Per  at [address] [place] [address]: \"The patient will continue with their current pain medications.  \"

## 2023-10-31 RX ORDER — HYDROCODONE BITARTRATE AND ACETAMINOPHEN 10; 325 MG/1; MG/1
1 TABLET ORAL EVERY 6 HOURS PRN
Qty: 120 TABLET | Refills: 0 | Status: SHIPPED | OUTPATIENT
Start: 2023-11-01 | End: 2023-12-01

## 2023-11-13 ENCOUNTER — TELEMEDICINE (OUTPATIENT)
Dept: PHYSICAL MEDICINE AND REHAB | Facility: CLINIC | Age: 58
End: 2023-11-13
Payer: MEDICARE

## 2023-11-13 DIAGNOSIS — M51.26 LUMBAR HERNIATED DISC: ICD-10-CM

## 2023-11-13 DIAGNOSIS — M48.02 CERVICAL STENOSIS OF SPINE: ICD-10-CM

## 2023-11-13 DIAGNOSIS — M51.9 LUMBAR DISC DISEASE: ICD-10-CM

## 2023-11-13 DIAGNOSIS — M43.12 SPONDYLOLISTHESIS OF CERVICAL REGION: ICD-10-CM

## 2023-11-13 DIAGNOSIS — M54.16 LUMBAR RADICULOPATHY: ICD-10-CM

## 2023-11-13 DIAGNOSIS — Q76.1 CERVICAL FUSION SYNDROME: Primary | ICD-10-CM

## 2023-11-13 DIAGNOSIS — M96.1 LUMBAR POST-LAMINECTOMY SYNDROME: ICD-10-CM

## 2023-11-13 DIAGNOSIS — M48.061 LUMBAR FORAMINAL STENOSIS: ICD-10-CM

## 2023-11-13 DIAGNOSIS — M50.90 CERVICAL DISC DISEASE: ICD-10-CM

## 2023-11-13 NOTE — PROGRESS NOTES
[facility]  [address]    Telemedicine Visit - Evaluation    Kae Barrera verbally consents to a Telemedicine Visit on 11/13/23. This visit is conducted using Telemedicine with live, interactive audio and video. Patient understands and accepts financial responsibility for any deductible, co-insurance and/or co-pays associated with this service. Chief Complaint: low back pain    HISTORY OF PRESENT ILLNESS:   Patient was last seen on 4/27/2023. She is doing ok. She had her pump refilled and this is doing well. Her pain is stable. She is stable with the Robaxin and the Norco.  She is taking 4 of the Norco per day. She is stable. She states that her neck is stiff at times but that she is not having any pain.     PAST MEDICAL HISTORY:     Past Medical History:   Diagnosis Date    Anxiety state, unspecified     Arthritis     Back problem     Borderline diabetic     Chronic pain     Depression     Failed back syndrome     per [employer]:  \"degenerative disc disease and failed back syndrome\"    Fibromyalgia     Herniated disc     High blood pressure     MRSA (methicillin resistant staph aureus) culture positive     Neuropathy     hands, left leg worse than right    Osteoarthritis     everywhere including tail bone    Prediabetes     borderline    Raynaud disease     Screening for human papillomavirus (HPV) 07/11/2016    normal    Visual impairment     contacts/glasses         PAST SURGICAL HISTORY:     Past Surgical History:   Procedure Laterality Date    ANESTH,SKIN SURGERY,NECK      APPENDECTOMY      BACK SURGERY      multiple    CARPAL TUNNEL RELEASE      bilateral    HAND/FINGER SURGERY UNLISTED      left hand    HIT PAIN IMP PUMP DONNY      HYSTERECTOMY      KNEE SURGERY      right knee    OTHER      anterior removal of cervical instrumentation; C3-4 anterior cervical discectomy and fusion, with structural allograft and plate instrumentation    OTHER  5664,5427?    2 cervical neck surgeries    OTHER  10/26/16    intrathecal morphine pump place per Dr. Hermelinda Wilhelm      per [employer]:  \"Mechanical complication of Nervous System device & implant graft. \"    OTHER SURGICAL HISTORY      per [employer]:  'Oopherectomy\"    OTHER SURGICAL HISTORY  1994    per [employer]:  \"Lumbar hemilaminectomy L3-4 on the left\"    OTHER SURGICAL HISTORY  1999    per [facility]:  \"L2, L3 partial laminectomy\"    OTHER SURGICAL HISTORY  1999    per [employer]:  \"Placement of intrathecal morphine pump through rt. \"    Monicaton    per [employer]:  \"low back surgeries x's 8\"    OTHER SURGICAL HISTORY  2010    per [facility]:  \"left shoulder surgery x's 2\"    OTHER SURGICAL HISTORY  2010    per [employer]:  \"ACDF\":    OTHER SURGICAL HISTORY  2012    per [employer]:  \"removal of pain med pump\"    OTHER SURGICAL HISTORY      per [employer]:  \"Arthrocentesis of the left shoulder joint\" x2    OTHER SURGICAL HISTORY      per [employer]:  \"Arthrocentesis of the left shoulder bicipital tendon\"    REVISE ULNAR NERVE AT ELBOW      left         CURRENT MEDICATIONS:     Current Outpatient Medications   Medication Sig Dispense Refill    HYDROcodone-acetaminophen  MG Oral Tab Take 1 tablet by mouth every 6 (six) hours as needed for Pain. 120 tablet 0    methocarbamol 750 MG Oral Tab TAKE 2 TABLETS EVERY 8 HOURS AS NEEDED. 540 tablet 3    morphINE (PF) 400 mg in sodium chloride 0.9% (PF) 40 mL IT infusion 40 mL by Intrathecal route continuous. (Forty mL) 1 each 0    estradiol 1 MG Oral Tab Take 1 tablet (1 mg total) by mouth daily. 90 tablet 3    Triamterene-HCTZ 37.5-25 MG Oral Cap Take 1 capsule by mouth daily. 5    Multiple Vitamin (MULTI-VITAMINS) Oral Tab Take 1 tablet by mouth daily.            ALLERGIES:     Allergies   Allergen Reactions    Shellfish-Derived Products TONGUE SWELLING    Cyclobenzaprine NAUSEA AND VOMITING     Upset stomach    Latex OTHER (SEE COMMENTS)     blisters Tizanidine NAUSEA AND VOMITING    Baclofen NAUSEA AND VOMITING         FAMILY HISTORY:     Family History   Problem Relation Age of Onset    Cancer Mother         Lung cancer    Diabetes Mother     Arthritis Mother     Other (Other) Mother         shi's syndrom/CREST syndrome    Alcohol and Other Disorders Associated Brother     Stroke Brother     Other (gout) Father     Other (RA) Maternal Uncle     Diabetes Other         Family h/o Diabetes mellitus    Other (Other) Other         Family h    Other (RA) Maternal Grandmother     Other (RA) Paternal Grandmother           SOCIAL HISTORY:     Social History     Socioeconomic History    Marital status: Single   Tobacco Use    Smoking status: Former     Packs/day: 0.50     Years: 10.00     Additional pack years: 0.00     Total pack years: 5.00     Types: Cigarettes     Quit date: 2012     Years since quittin.8    Smokeless tobacco: Never   Vaping Use    Vaping Use: Never used   Substance and Sexual Activity    Alcohol use: No     Alcohol/week: 0.0 standard drinks of alcohol    Drug use: Never    Sexual activity: Not Currently   Other Topics Concern    Caffeine Concern Yes     Comment: 1 cup coffee daily    Exercise Yes     Comment: home exercises in bed   Social History Narrative    The patient uses the following assistive device(s):  tri-point cane. The patient does not live in a home with stairs.           REVIEW OF SYSTEMS:   As per above HPI    PHYSICAL EXAM:   General: No immediate distress  Head: Normocephalic/ Atraumatic  Eyes: Extra-occular movements intact  Ears/Nose/Throat:  External appearance identifies normal appearance without obvious deformity  Cardiovascular: No cyanosis, clubbing or edema  Respiratory: Non-labored respirations  Skin: No lesions noted   Neurological: alert & oriented x 3, attentive, able to follow commands, comprehention intact, spontaneous speech intact  Psychiatric: Mood and affect appropriate    Musculoskeletal Exam:  Cervical Spine:    Posture: mild chin forward superiorly rotated protracted shoulder posture. Shoulders: Level   Head: In neutral     LABS:   No results found for: \"EAG\", \"A1C\"  Lab Results   Component Value Date    WBC 8.0 05/30/2023    RBC 4.60 05/30/2023    HGB 13.9 05/30/2023    HCT 42.7 05/30/2023    MCV 92.8 05/30/2023    MCH 30.2 05/30/2023    MCHC 32.6 05/30/2023    RDW 12.2 05/30/2023    .0 05/30/2023    MPV 8.5 06/08/2017     Lab Results   Component Value Date     (H) 05/30/2023    BUN 16 05/30/2023    BUNCREA 20.3 (H) 05/30/2023    CREATSERUM 0.79 05/30/2023    ANIONGAP 3 05/30/2023     10/28/2016    GFRNAA 73 05/06/2022    GFRAA 84 05/06/2022    CA 9.8 05/30/2023    OSMOCALC 285 05/30/2023    ALKPHO 55 05/30/2023    AST 26 05/30/2023    ALT 28 05/30/2023    ALKPHOS 58 06/02/2015    BILT 0.4 05/30/2023    TP 7.6 05/30/2023    ALB 3.9 05/30/2023    GLOBULIN 3.7 05/30/2023    AGRATIO 1.4 06/02/2015     05/30/2023    K 4.9 05/30/2023    CL 99 05/30/2023    CO2 35.0 (H) 05/30/2023     Lab Results   Component Value Date    PTP 11.7 05/06/2022    PT 11.4 (L) 06/08/2015    INR 0.85 05/06/2022     No results found for: \"VITD\", \"QVITD\", \"[id number]\"      ASSESSMENT:     1. s/p C3-4-C7 ACDF    2. left > right L5-S1 chronic radiculopathy    3. L5-S1 left mod foraminal stenosis     4. L5-S1 left mod foraminal HNP    5. L2-3 bilateral mod far latera, L3-4/L4-5 right mild-mod far lateral bulging discs    6. C3-4 mod diffuse, C7-T1 left mild bulging discs    7. C3-4 mod central stenosis    8. s/p left L3-5 laminectomies    9. C3-4 grade 1 spondylolisthesis, 3.2 mm movement      PLAN:   The patient will continue with their current pain medications. She will continue with her current activities.     Follow-up:  6 months    We discussed that a telemedicine visit is in place of an office visit; however, this limits the ability to perform a thorough physical examination which may affect objective findings related to a specific condition and can affect treatment. The patient verbalized understanding with this plan and was in agreement. There are no barriers to learning. All questions were answered. Marvin Menon M.D. Physical Medicine and Rehabilitation   11/13/2023    Telehealth outside of [address] Verbal Consent   I conducted a telehealth visit with Kae Barrera today, 11/13/23, which was completed using two-way, real-time interactive audio and video communication. This has been done in good lay to provide continuity of care in the best interest of the provider-patient relationship, due to the COVID -19 public health crisis/national emergency where restrictions of face-to-face office visits are ongoing. Every conscious effort was taken to allow for sufficient and adequate time to complete the visit. The patient was made aware of the limitations of the telehealth visit, including treatment limitations as no physical exam could be performed. The patient was advised to call 911 or to go to the ER in case there was an emergency. The patient was also advised of the potential privacy & security concerns related to the telehealth platform. The patient was made aware of where to find [facility] notice of privacy practices, telehealth consent form and other related consent forms and documents. which are located on the [facility] website. The patient verbally agreed to telehealth consent form, related consents and the risks discussed. Lastly, the patient confirmed that they were in [place]. Included in this visit, time may have been spent reviewing labs, medications, radiology tests and decision making. Appropriate medical decision-making and tests are ordered as detailed in the plan of care above. Coding/billing information is submitted for this visit based on complexity of care and/or time spent for the visit.

## 2023-11-14 ENCOUNTER — TELEPHONE (OUTPATIENT)
Dept: PAIN CLINIC | Facility: CLINIC | Age: 58
End: 2023-11-14

## 2023-11-14 DIAGNOSIS — M96.1 FAILED BACK SYNDROME OF LUMBAR SPINE: ICD-10-CM

## 2023-11-14 NOTE — TELEPHONE ENCOUNTER
ITP REFILL due by 12/15/2023. Please place order for ITP Rx.   Last medication ordered:  morphINE (PF) 400 mg in sodium chloride 0.9% (PF) 40 mL IT infusion     OV scheduled 12/12/2023    E-Scribe Date: 12/6/2023

## 2023-11-27 NOTE — TELEPHONE ENCOUNTER
Refill Request    Medication request: HYDROcodone-acetaminophen  MG Oral Tab Take 1 tablet by mouth every 6 (six) hours as needed for Pain. LOV:11/13/2023 (Video Visit) Phyllis Douglas MD   Due back to clinic per last office note:  \"Return in about 6 months (around 5/13/2024). \"  NOV: Visit date not found      [place]: 11/01/2023 #120    Urine drug screen (if applicable): 9/55/0386  Pain contract: Valid until 5/08/2024    LOV plan (if weaning or changing medications): Per Dr. Fuentes Look notes: \"The patient will continue with their current pain medications.  \"

## 2023-11-28 RX ORDER — HYDROCODONE BITARTRATE AND ACETAMINOPHEN 10; 325 MG/1; MG/1
1 TABLET ORAL EVERY 6 HOURS PRN
Qty: 120 TABLET | Refills: 0 | Status: SHIPPED | OUTPATIENT
Start: 2023-12-01 | End: 2023-12-31

## 2023-12-08 NOTE — TELEPHONE ENCOUNTER
ITP PUMP Medication received. Verified by Cheryle Hemp and Leatha Gaucher RN. Dose correct and medication stored in lock box.     Rx #: [id number]  LOT #: [email or web address]  Expiration date:   01/06/2024

## 2023-12-08 NOTE — TELEPHONE ENCOUNTER
ITP PUMP Medication received. Verified by Juan R Chicas and Garett Hernandes RN. Dose correct and medication stored in lock box.     Rx #: [id number]  LOT #: [email or web address]  Expiration date:   1/6/2024

## 2023-12-12 ENCOUNTER — OFFICE VISIT (OUTPATIENT)
Dept: PAIN CLINIC | Facility: CLINIC | Age: 58
End: 2023-12-12
Payer: MEDICARE

## 2023-12-12 VITALS
DIASTOLIC BLOOD PRESSURE: 78 MMHG | HEART RATE: 76 BPM | SYSTOLIC BLOOD PRESSURE: 102 MMHG | OXYGEN SATURATION: 92 % | BODY MASS INDEX: 17 KG/M2 | WEIGHT: 98 LBS

## 2023-12-12 DIAGNOSIS — Z97.8 PRESENCE OF INTRATHECAL PUMP: ICD-10-CM

## 2023-12-12 DIAGNOSIS — G89.29 OTHER CHRONIC PAIN: Primary | ICD-10-CM

## 2023-12-12 PROCEDURE — 62370 ANL SP INF PMP W/MDREPRG&FIL: CPT | Performed by: PHYSICIAN ASSISTANT

## 2023-12-12 NOTE — PROGRESS NOTES
Timeout completed prior to procedure @ 2869. Participants present for timeout:  Shae Sue, VIOLA Tariq , and patient.      Estimated amount:4.2 ml  Wasted amount:7 ml

## 2023-12-27 RX ORDER — HYDROCODONE BITARTRATE AND ACETAMINOPHEN 10; 325 MG/1; MG/1
1 TABLET ORAL EVERY 6 HOURS PRN
Qty: 120 TABLET | Refills: 0 | Status: SHIPPED | OUTPATIENT
Start: 2023-12-31 | End: 2024-01-30

## 2023-12-27 NOTE — TELEPHONE ENCOUNTER
Refill Request    Medication request: HYDROcodone-acetaminophen  MG Oral Tab Take 1 tablet by mouth every 6 (six) hours as needed for Pain. LOV:11/13/23-televisit Sofiya Gibson MD   Due back to clinic per last office note:  \"Follow-up:  6 months \"  NOV: Visit date not found      [place]: 12/1/23 #120 - 30 day supply    Urine drug screen (if applicable): 8/62/76  Pain contract: Valid until 5/8/24    LOV plan (if weaning or changing medications): Per  at [address] [place] [address]: \"The patient will continue with their current pain medications.  \"

## 2024-01-08 ENCOUNTER — TELEPHONE (OUTPATIENT)
Dept: PAIN CLINIC | Facility: CLINIC | Age: 59
End: 2024-01-08

## 2024-01-08 DIAGNOSIS — M96.1 FAILED BACK SYNDROME OF LUMBAR SPINE: ICD-10-CM

## 2024-01-08 NOTE — TELEPHONE ENCOUNTER
ITP REFILL due by 3/7/2024.  Please place order for ITP Rx.  Last medication ordered:  morphINE (PF) 400 mg in sodium chloride 0.9% (PF) 40 mL IT infusion     OV scheduled 2/27/2024    E-Scribe Date: 2/21/2024

## 2024-01-28 ENCOUNTER — PATIENT MESSAGE (OUTPATIENT)
Dept: PHYSICAL MEDICINE AND REHAB | Facility: CLINIC | Age: 59
End: 2024-01-28

## 2024-01-29 NOTE — TELEPHONE ENCOUNTER
From: Karine Ramesh  To: Homer Gibson  Sent: 1/28/2024 10:54 AM CST  Subject: Prescription refill     I put a refill thru [facility] for my Robaxin, thank you very much Karine Cordova

## 2024-02-23 NOTE — TELEPHONE ENCOUNTER
ITP PUMP Medication received.  Verified by Laura ROD and Tayler ROD.  Dose correct and medication stored in lock box.    Rx #: [id number]  LOT #: [id number]  Expiration date:   03/07/2024

## 2024-02-23 NOTE — TELEPHONE ENCOUNTER
ITP PUMP Medication received.  Verified by Tayler ROD and Laura RN.  Dose correct and medication stored in lock box.    Rx #: [id number]  LOT #: [id number]  Expiration date:   3/7/2024

## 2024-02-25 DIAGNOSIS — M54.16 LUMBAR RADICULOPATHY: ICD-10-CM

## 2024-02-26 RX ORDER — HYDROCODONE BITARTRATE AND ACETAMINOPHEN 10; 325 MG/1; MG/1
1 TABLET ORAL EVERY 6 HOURS PRN
Qty: 120 TABLET | Refills: 0 | Status: SHIPPED | OUTPATIENT
Start: 2024-02-26 | End: 2024-03-27

## 2024-02-26 NOTE — TELEPHONE ENCOUNTER
Refill Request    Medication request:   HYDROcodone-acetaminophen  MG Oral Tab         LOV: 4/27/2023 Homer Gibson MD   RTC: 6 months  NOV: Visit date not found      [place]: 1/31/2024 #30 days    UDS: (if applicable): 5/30/2023  Pain contract: 5/8/2023    LOV plan (if weaning or changing medications): The patient will continue with their current pain medications.

## 2024-02-27 ENCOUNTER — OFFICE VISIT (OUTPATIENT)
Dept: PAIN CLINIC | Facility: CLINIC | Age: 59
End: 2024-02-27
Payer: MEDICARE

## 2024-02-27 VITALS
SYSTOLIC BLOOD PRESSURE: 112 MMHG | BODY MASS INDEX: 17 KG/M2 | DIASTOLIC BLOOD PRESSURE: 78 MMHG | HEART RATE: 72 BPM | OXYGEN SATURATION: 98 % | WEIGHT: 98 LBS

## 2024-02-27 DIAGNOSIS — Z97.8 PRESENCE OF INTRATHECAL PUMP: ICD-10-CM

## 2024-02-27 DIAGNOSIS — Z98.1 S/P CERVICAL SPINAL FUSION: ICD-10-CM

## 2024-02-27 DIAGNOSIS — M96.1 FAILED BACK SYNDROME OF LUMBAR SPINE: Primary | ICD-10-CM

## 2024-02-27 DIAGNOSIS — G89.29 OTHER CHRONIC PAIN: ICD-10-CM

## 2024-02-27 NOTE — PATIENT INSTRUCTIONS
Refill policies:    Allow 2-3 business days for refills; controlled substances may take longer.  Contact your pharmacy at least 5 days prior to running out of medication and have them send an electronic request or submit request through the “request refill” option in your [employer] account.  Refills are not addressed on weekends; covering physicians do not authorize routine medications on weekends.  No narcotics or controlled substances are refilled after noon on Fridays or by on call physicians.  By law, narcotics must be electronically prescribed.  A 30 day supply with no refills is the maximum allowed.  If your prescription is due for a refill, you may be due for a follow up appointment.  To best provide you care, patients receiving routine medications need to be seen at least once a year.  Patients receiving narcotic/controlled substance medications need to be seen at least once every 3 months.  In the event that your preferred pharmacy does not have the requested medication in stock (e.g. Backordered), it is your responsibility to find another pharmacy that has the requested medication available.  We will gladly send a new prescription to that pharmacy at your request.    Scheduling Tests:    If your physician has ordered radiology tests such as MRI or CT scans, please contact Central Scheduling at [phone] right away to schedule the test.  Once scheduled, the [facility] Centralized Referral Team will work with your insurance carrier to obtain pre-certification or prior authorization.  Depending on your insurance carrier, approval may take 3-10 days.  It is highly recommended patients assure they have received an authorization before having a test performed.  If test is done without insurance authorization, patient may be responsible for the entire amount billed.      Precertification and Prior Authorizations:  If your physician has recommended that you have a procedure or additional testing performed the [facility]  Centralized Referral Team will contact your insurance carrier to obtain pre-certification or prior authorization.    You are strongly encouraged to contact your insurance carrier to verify that your procedure/test has been approved and is a COVERED benefit.  Although the [facility] Centralized Referral Team does its due diligence, the insurance carrier gives the disclaimer that \"Although the procedure is authorized, this does not guarantee payment.\"    Ultimately the patient is responsible for payment.   Thank you for your understanding in this matter.  Paperwork Completion:  If you require FMLA or disability paperwork for your recovery, please make sure to either drop it off or have it faxed to our office at [phone]. Be sure the form has your name and date of birth on it.  The form will be faxed to our Forms Department and they will complete it for you.  There is a 25$ fee for all forms that need to be filled out.  Please be aware there is a 10-14 day turnaround time.  You will need to sign a release of information (LINSEY) form if your paperwork does not come with one.  You may call the Forms Department with any questions at [phone].  Their fax number is [phone].

## 2024-02-27 NOTE — PROGRESS NOTES
HPI:   Karine Ramesh presents for intra-thecal pump medication refill.   She presents with complaints of Low back pain.  Patient reports that she has chronic low back pain.  She states she has had 8 lumbar surgeries 2 cervical fusions multiple orthopedic procedures and just has chronic pain.  Patient states she has had an intrathecal pump for many many years.  She states her first intrathecal pump was placed in [facility].  This most recent pump was placed May 9, 2022 with Dr. Bustillo.  Patient denies any side effects from the medications infusing in the pump.  Patient does use methocarbamol 750 mg.  She states she took a dose of that this morning.  Patient also receives hydrocodone tablets 10/325 #120 from Dr. Homer Gibson at [place] to supplement her  Patient states she lives alone.  The pump and medication improve her functional abilities.  Patient states that her son lives nearby and she often sees her grandchildren.  She enjoys yoga and likes to stay active.  She is not requesting any adjustments in her pump medication.    The pain is described as moderate aching that is constant .  The patient’s activity level has remained the same since last visit.  The pain is worst unrelated to time of day.     Changes in condition/history since last visit: here for refill of IT pump.  She is stable, and requires no adjustments.         The following activities will increase the patient’s pain: any prolonged activity     The following activities decrease the patient’s pain: limiting activity level     Functional Assessment: Patient reports that they are able to complete all of their ADL's such as eating, bathing, using the toilet, dressing and getting up from a bed or a chair independently.    Current Medications:  Current Outpatient Medications   Medication Sig Dispense Refill    HYDROcodone-acetaminophen  MG Oral Tab Take 1 tablet by mouth every 6 (six) hours as needed for Pain.  120 tablet 0    morphINE (PF) 400 mg in sodium chloride 0.9% (PF) 40 mL IT infusion 40 mL by Intrathecal route continuous. (Forty mL) 1 each 0    methocarbamol 750 MG Oral Tab TAKE 2 TABLETS EVERY 8 HOURS AS NEEDED. 540 tablet 3    estradiol 1 MG Oral Tab Take 1 tablet (1 mg total) by mouth daily. 90 tablet 3    Triamterene-HCTZ 37.5-25 MG Oral Cap Take 1 capsule by mouth daily.  5    Multiple Vitamin (MULTI-VITAMINS) Oral Tab Take 1 tablet by mouth daily.        Side effects of medications: None    Patient requires assistance with: No assistance required    Have you recently had any feelings of harming yourself or others? The patient's response was no.    Current Pump Information:   12/12/2023   IT Pump Assessment    Patient's preferred phone number [phone]    Physician managing IT Pump Peng    IT Pump medication(s) & concentration Infomorph (10.0mg/ml)    IT Pump Make Medtronic    IT Pump Model Number 8637-40    IT Pump infusion mode    IT Pump infusion mode    IT Pump daily continuous dose 3.594    IT Pump PTM dose 0.2    IT Pump PTM lockout (hours) 2    IT Pump Max PTM doses per day 4    IT Pump interrogation date 12/12/2023    Minimum Alarm Dose 2    Alarm Date 3/7/2024    Expected Pump Replacement Date (DALILA) 1/1/2029    Next IT Pump refill date 3/7/2024    IT Pump medication ordered    Expected Waste Amount 4.2    Actual Waste Amount 7        Telemetry Volume: 7.4mL  Actual waste: 10.5      New Pump Information:     2/27/2024   IT Pump Assessment    Patient's preferred phone number [phone]    Physician managing IT Pump Peng    IT Pump medication(s) & concentration Infumorph (10.0mg/ml)    IT Pump Make Medtronic    IT Pump Model Number 8637-40    IT Pump infusion mode    IT Pump infusion mode    IT Pump daily continuous dose 3.594    IT Pump PTM dose 0.2    IT Pump PTM lockout (hours) 2    IT Pump Max PTM doses per day 4    IT Pump interrogation date 2/27/2024    Minimum Alarm Dose 2    Alarm Date  5/23/2024    Expected Pump Replacement Date (DALILA) 1/1/2029    Next IT Pump refill date 5/23/2024    IT Pump medication ordered    Expected Waste Amount 7.4    Actual Waste Amount 10.5          Technique:  Informed consent obtained.  Patient placed in the supine position and Chloraprep was performed over the pump site.  Using sterile technique, the pump reservoir was accessed via a 22 gauge Day needle and      10.5cc of clear fluid was withdrawn and wasted with witness.  Using the medications filter, the pump was refilled with 40cc of infurmorph 10mg/ml.  Needle was removed intact.  The pump was reprogrammed to reflect this new volume.  New alarm date noted to patient.  Band-Aid applied to injection site.  Patient tolerated the procedure well, Patient denies bladder incontinence, GI constipation controlled, No evidence of bleeding noted    Physical Exam:   Vitals: /78   Pulse 72   Wt 98 lb (44.5 kg)   SpO2 98%   BMI 16.82 kg/m²   VAS Pain Score:  6/10  General Appearance: Well developed, well nourished, normal build, independent body habitus, no apparent physical disabilities, well groomed, Thin build    Neurological Exam: WNL-Orientation to time, place and person, normal mood & effect, normal concentration & attention span    Inspection:   Non antalgic  No gross motor/sensory deficits  Strength BUE/BLE 5/5  Mild edema to right LE noted: patient has had doppler/neg for dvt  Radiology/Lab Test Reviewed: no new images  Patient Education: Patient was advised to continue normal activities as tolerated and was advised against any form of bedrest, since recent guidelines promote and encourage physical activity for improvment of functionality and overall pain.  (Family Practice Vol. 16, No. 1, 39-45Â© [employer] 1999 ).  Patient educated and verbalized understanding.    Medical Decision Making:   Diagnosis:    Encounter Diagnoses   Name Primary?    Failed back syndrome of lumbar spine Yes    S/P  cervical spinal fusion     Other chronic pain     Presence of intrathecal pump      Impression:   Here today for IT pump refill, and did refill pump without changes to rate or concentration. Reprogrammed to reflect new alarm date/volume.  Patient does continue to use hydrocodone tablets receiving hydrocodone 10/325 from [facility] MD: [place] physician using this for breakthrough pain.  She also uses Robaxin 750 mg for muscle relaxation.  She denies any side effects and states that these medications on top of her intrathecal pump provide her improved functional ability and improve quality of life.  Patient does not require any adjustments in her intrathecal pump and will follow-up accordingly.      Plan:   > Refill IT pump today: No changes  > Follow-up at next prescribed timeframe  No orders of the defined types were placed in this encounter.      Meds & Refills for this Visit:  Requested Prescriptions      No prescriptions requested or ordered in this encounter       Imaging & Consults:  None    The patient indicates understanding of these issues and agrees to the plan.    ID#[id number]

## 2024-02-27 NOTE — PROGRESS NOTES
Timeout completed prior to procedure @ 3380  Participants present for timeout:  Juanita Rodas , RN Laura , and patient.     Estimated amount:7.4 ml  Wasted Amount:10.5 ml

## 2024-03-25 DIAGNOSIS — M54.16 LUMBAR RADICULOPATHY: ICD-10-CM

## 2024-03-26 NOTE — TELEPHONE ENCOUNTER
Refill Request    Medication request: HYDROcodone-acetaminophen  MG Oral Tab. Take 1 tablet by mouth every 6 (six) hours as needed for Pain.     LOV:4/27/2023 (& 11/13/2023 TELEVISIT) Homer Gibson MD   Due back to clinic per last office note: Per Dr. Gibson: \"Follow-up:  6 months\"  NOV: Visit date not found   Kahnoodlet message sent that a follow up appointment is required prior to processing refill.     [place] 03/01/2024 #120  Earliest fill date: 03/31/2024. Order edited to reflect this date.    Urine drug screen (if applicable): 05/30/2023  Pain contract: VALID - Expires 05/08/2024    LOV plan (if weaning or changing medications): Per Dr. Gibson: \"The patient will continue with their current pain medications.\"

## 2024-03-28 RX ORDER — HYDROCODONE BITARTRATE AND ACETAMINOPHEN 10; 325 MG/1; MG/1
1 TABLET ORAL EVERY 6 HOURS PRN
Qty: 120 TABLET | Refills: 0 | Status: SHIPPED | OUTPATIENT
Start: 2024-03-28 | End: 2024-04-27

## 2024-04-04 ENCOUNTER — TELEPHONE (OUTPATIENT)
Dept: PAIN CLINIC | Facility: CLINIC | Age: 59
End: 2024-04-04

## 2024-04-04 DIAGNOSIS — M96.1 FAILED BACK SYNDROME OF LUMBAR SPINE: ICD-10-CM

## 2024-04-27 DIAGNOSIS — M54.16 LUMBAR RADICULOPATHY: ICD-10-CM

## 2024-04-29 NOTE — TELEPHONE ENCOUNTER
Refill Request    Medication request: HYDROcodone-acetaminophen  MG Oral Tab  Take 1 tablet by mouth every 6 (six) hours as needed for Pain.     LOV: 11/13/23 - televisit  Due back to clinic per last office note:  \"Follow-up:  6 months \"  NOV: 6/27/2024 Homer Gibson MD      [facility]    Urine drug screen (if applicable): 5/30/23  Pain contract: Valid until 5/8/24    LOV plan (if weaning or changing medications): Per  at LOV: \"The patient will continue with their current pain medications.\"

## 2024-04-30 ENCOUNTER — TELEPHONE (OUTPATIENT)
Dept: PHYSICAL MEDICINE AND REHAB | Facility: CLINIC | Age: 59
End: 2024-04-30

## 2024-04-30 RX ORDER — HYDROCODONE BITARTRATE AND ACETAMINOPHEN 10; 325 MG/1; MG/1
1 TABLET ORAL EVERY 6 HOURS PRN
Qty: 120 TABLET | Refills: 0 | Status: SHIPPED | OUTPATIENT
Start: 2024-05-01 | End: 2024-05-31

## 2024-05-01 RX ORDER — METHOCARBAMOL 750 MG/1
TABLET, FILM COATED ORAL
Qty: 540 TABLET | Refills: 3 | Status: SHIPPED | OUTPATIENT
Start: 2024-05-01

## 2024-05-01 NOTE — TELEPHONE ENCOUNTER
Refill Request    Medication request: methocarbamol 750 MG Oral Tab  TAKE 2 TABLETS EVERY 8 HOURS AS NEEDED.     LOV: 11/13/23- televisit   Due back to clinic per last office note:  \"Follow-up:  6 months \"  NOV: 6/27/2024 Homer Gibson MD      [facility]/Last refill: 1/28/24 #540 - 90 day supply    Urine drug screen (if applicable): 5/30/23  Pain contract: Valid until 5/8/24    LOV plan (if weaning or changing medications): Per  at LOV: \"She is stable with the Robaxin and the Norco. She is taking 4 of the Norco per day. \"  \"The patient will continue with their current pain medications. \"

## 2024-05-01 NOTE — TELEPHONE ENCOUNTER
Methocarbamol PA initiated via Epic.     PA pending.     Per epic: \"Waiting for Payer Response   5/1/2024  2:25 PM \"

## 2024-05-16 ENCOUNTER — OFFICE VISIT (OUTPATIENT)
Dept: PAIN CLINIC | Facility: CLINIC | Age: 59
End: 2024-05-16

## 2024-05-16 VITALS
DIASTOLIC BLOOD PRESSURE: 62 MMHG | BODY MASS INDEX: 17 KG/M2 | HEART RATE: 78 BPM | SYSTOLIC BLOOD PRESSURE: 104 MMHG | WEIGHT: 98 LBS | OXYGEN SATURATION: 98 %

## 2024-05-16 DIAGNOSIS — Z97.8 PRESENCE OF INTRATHECAL PUMP: Primary | ICD-10-CM

## 2024-05-16 DIAGNOSIS — G89.29 OTHER CHRONIC PAIN: ICD-10-CM

## 2024-05-16 PROCEDURE — 62370 ANL SP INF PMP W/MDREPRG&FIL: CPT | Performed by: PHYSICIAN ASSISTANT

## 2024-05-16 RX ORDER — NIRMATRELVIR AND RITONAVIR 300-100 MG
KIT ORAL
COMMUNITY
Start: 2024-04-26 | End: 2024-05-16 | Stop reason: ALTCHOICE

## 2024-05-16 NOTE — PATIENT INSTRUCTIONS
Refill policies:    Allow 2-3 business days for refills; controlled substances may take longer.  Contact your pharmacy at least 5 days prior to running out of medication and have them send an electronic request or submit request through the “request refill” option in your [employer] account.  Refills are not addressed on weekends; covering physicians do not authorize routine medications on weekends.  No narcotics or controlled substances are refilled after noon on Fridays or by on call physicians.  By law, narcotics must be electronically prescribed.  A 30 day supply with no refills is the maximum allowed.  If your prescription is due for a refill, you may be due for a follow up appointment.  To best provide you care, patients receiving routine medications need to be seen at least once a year.  Patients receiving narcotic/controlled substance medications need to be seen at least once every 3 months.  In the event that your preferred pharmacy does not have the requested medication in stock (e.g. Backordered), it is your responsibility to find another pharmacy that has the requested medication available.  We will gladly send a new prescription to that pharmacy at your request.    Scheduling Tests:    If your physician has ordered radiology tests such as MRI or CT scans, please contact Central Scheduling at [phone] right away to schedule the test.  Once scheduled, the [facility] Centralized Referral Team will work with your insurance carrier to obtain pre-certification or prior authorization.  Depending on your insurance carrier, approval may take 3-10 days.  It is highly recommended patients assure they have received an authorization before having a test performed.  If test is done without insurance authorization, patient may be responsible for the entire amount billed.      Precertification and Prior Authorizations:  If your physician has recommended that you have a procedure or additional testing performed the [facility]  Centralized Referral Team will contact your insurance carrier to obtain pre-certification or prior authorization.    You are strongly encouraged to contact your insurance carrier to verify that your procedure/test has been approved and is a COVERED benefit.  Although the [facility] Centralized Referral Team does its due diligence, the insurance carrier gives the disclaimer that \"Although the procedure is authorized, this does not guarantee payment.\"    Ultimately the patient is responsible for payment.   Thank you for your understanding in this matter.  Paperwork Completion:  If you require FMLA or disability paperwork for your recovery, please make sure to either drop it off or have it faxed to our office at [phone]. Be sure the form has your name and date of birth on it.  The form will be faxed to our Forms Department and they will complete it for you.  There is a 25$ fee for all forms that need to be filled out.  Please be aware there is a 10-14 day turnaround time.  You will need to sign a release of information (LINSEY) form if your paperwork does not come with one.  You may call the Forms Department with any questions at [phone].  Their fax number is [phone].

## 2024-05-16 NOTE — PROGRESS NOTES
Patient presents in office today with reported pain in chronic pain    Current pain level reported = 3.5/10    Last reported dose of ITP

## 2024-05-16 NOTE — PROGRESS NOTES
HPI:   Karine Ramesh presents for intra-thecal pump medication refill.   She presents with complaints of Low back pain.    The pain is described as moderate aching that is constant .  The patient’s activity level has remained the same since last visit.  The pain is worst unrelated to time of day.    Changes in condition/history since last visit: here for refill of IT pump.  She is stable, and requires no adjustments.      Of note, IT pump replacement 5/9/22 with Dr. Bustillo.      The following activities will increase the patient’s pain: any prolonged activity    The following activities decrease the patient’s pain: limiting activity level    Functional Assessment: Patient reports that they are able to complete all of their ADL's such as eating, bathing, using the toilet, dressing and getting up from a bed or a chair independently.    Current Medications:  Current Outpatient Medications   Medication Sig Dispense Refill    morphINE (PF) 400 mg in sodium chloride 0.9% (PF) 40 mL IT infusion 40 mL by Intrathecal route continuous. (Forty mL) 1 each 0    methocarbamol 750 MG Oral Tab TAKE 2 TABLETS EVERY 8 HOURS AS NEEDED. 540 tablet 3    HYDROcodone-acetaminophen  MG Oral Tab Take 1 tablet by mouth every 6 (six) hours as needed for Pain. 120 tablet 0    Triamterene-HCTZ 37.5-25 MG Oral Cap Take 1 capsule by mouth daily.  5    Multiple Vitamin (MULTI-VITAMINS) Oral Tab Take 1 tablet by mouth daily.        Side effects of medications: None    Patient requires assistance with: No assistance required    Reviewed Patient History Dated: 2/27/24 no changes noted  Have you recently had any feelings of harming yourself or others? The patient's response was no.    Current Pump Information:  Medication and concentration of medication currently in pump: morphine 10 mg/mL  Continuous Rate: 3.594 mg per 24 hours  PA dosing: Enabled  PA dose 0.200 mg every 2 hours., Maximum delivery of 4 doses per 24 hours., If all PA doses  utilized then patient will receive 4.374 mg per 24 hours. and This would be a 21.7 % increase over the simple continous dose for 24 hour period.  Reservoir Volume: 40 mL  Low Reservoir Volume: 2 mL  Telemetry Volume: 7.1 mL    New Pump Information:  Medication and concentration of medication being placed in pump:  Same as current concentration and medication  Actual residual volume: 10.5  Waste witnessed by: ANTOINETTE (initials)  New Continuous Rate: 3.594 mg per 24 hours  PA dosing: Enabled  PA dose 0.200 mg every 2 hours., Maximum delivery of 4 doses per 24 hours., If all PA doses utilized then patient will receive 4.374 mg per 24 hours. and This would be a 21.7 % increase over the simple continous dose for 24 hour period.  Reservoir Volume: 40 mL  Low Reservoir Alarm Volume: 2 mL  New Reservoir Alarm Date: 8/10/24    Technique:  Informed consent obtained.  Patient placed in the supine position and chloraprep was performed over the pump site.  Using sterile technique, the pump reservoir was accessed via a 22 gauge Day needle and 10.5 cc of clear fluid was withdrawn and wasted with witness.  The pump was refilled with 40cc of 10 mg/mL morphine.  Needle was removed intact.  The pump was reprogrammed to reflect this new volume.  New alarm date noted to patient.  Band-Aid applied to injection site.  Patient tolerated the procedure well, No evidence of bleeding noted      Physical Exam:   Vitals: /62   Pulse 78   Wt 98 lb (44.5 kg)   SpO2 98%   BMI 16.82 kg/m²   VAS Pain Score:  3-4/10  General Appearance: Well developed, well nourished, normal build, independent body habitus, no apparent physical disabilities, well groomed    Neurological Exam: WNL-Orientation to time, place and person, normal mood & effect, normal concentration & attention span    Inspection: non-antalgic, no acute distress    Radiology/Lab Test Reviewed: no new imaging    Medical Decision Making:   Diagnosis:    Encounter Diagnoses   Name  Primary?    Presence of intrathecal pump Yes    Other chronic pain        Impression: Here today for IT pump refill, and did refill pump without changes to rate or concentration.  Reprogrammed to reflect new alarm date/volume.        Plan: refill IT pump without complication.  Reprogrammed to reflect new alarm date/volume.  F/u prior to above listed alarm date.        No orders of the defined types were placed in this encounter.      Meds & Refills for this Visit:  Requested Prescriptions      No prescriptions requested or ordered in this encounter       Imaging & Consults:  None    The patient indicates understanding of these issues and agrees to the plan.    GLYNN Guevara

## 2024-05-16 NOTE — PROGRESS NOTES
Timeout completed prior to procedure @ 8367.  Participants present for timeout:  Mick MAKI, VIOLA Lester, and patient.      Expected Waste Amt: 7.1 mL    Actual Waste Amt: 10.5 mL

## 2024-05-28 DIAGNOSIS — M54.16 LUMBAR RADICULOPATHY: ICD-10-CM

## 2024-05-30 RX ORDER — HYDROCODONE BITARTRATE AND ACETAMINOPHEN 10; 325 MG/1; MG/1
1 TABLET ORAL EVERY 6 HOURS PRN
Qty: 120 TABLET | Refills: 0 | Status: SHIPPED | OUTPATIENT
Start: 2024-05-31 | End: 2024-06-30

## 2024-06-12 ENCOUNTER — TELEPHONE (OUTPATIENT)
Dept: PAIN CLINIC | Facility: CLINIC | Age: 59
End: 2024-06-12

## 2024-06-12 NOTE — TELEPHONE ENCOUNTER
ITP REFILL due by 8/10/24.  Please place order for ITP Rx.  Last medication ordered:  morphINE (PF) 400 mg in sodium chloride 0.9% (PF) 40 mL IT infusion     OV scheduled 8/7/24    E-Scribe Date: 7/31/24

## 2024-06-27 ENCOUNTER — TELEMEDICINE (OUTPATIENT)
Dept: PHYSICAL MEDICINE AND REHAB | Facility: CLINIC | Age: 59
End: 2024-06-27

## 2024-06-27 DIAGNOSIS — M51.9 LUMBAR DISC DISEASE: Primary | ICD-10-CM

## 2024-06-27 DIAGNOSIS — M48.061 LUMBAR FORAMINAL STENOSIS: ICD-10-CM

## 2024-06-27 DIAGNOSIS — M54.16 LUMBAR RADICULOPATHY: ICD-10-CM

## 2024-06-27 DIAGNOSIS — M51.26 LUMBAR HERNIATED DISC: ICD-10-CM

## 2024-06-27 PROCEDURE — 99214 OFFICE O/P EST MOD 30 MIN: CPT | Performed by: PHYSICAL MEDICINE & REHABILITATION

## 2024-06-27 RX ORDER — HYDROCODONE BITARTRATE AND ACETAMINOPHEN 10; 325 MG/1; MG/1
1 TABLET ORAL EVERY 6 HOURS PRN
Qty: 120 TABLET | Refills: 0 | Status: SHIPPED | OUTPATIENT
Start: 2024-06-30 | End: 2024-07-30

## 2024-06-27 NOTE — PROGRESS NOTES
[facility]    Telemedicine Visit - Evaluation    Karine Ramesh verbally consents to a Telemedicine Visit on 06/27/24. This visit is conducted using Telemedicine with live, interactive audio and video.    Patient understands and accepts financial responsibility for any deductible, co-insurance and/or co-pays associated with this service.    Chief Complaint:   Chief Complaint   Patient presents with    Low Back Pain       HISTORY OF PRESENT ILLNESS:   The patient is a 58 year old female who was last seen on 11/13/2023.  She states that she is stable with her current medications.  She is able to be active.  She does not feel that she needs any adjustments to her medications at this time.  She just had her pump refilled.    PAST MEDICAL HISTORY:     Past Medical History:    Anxiety state, unspecified    Arthritis    Back problem    Borderline diabetic    Chronic pain    Depression    Failed back syndrome    per [employer]:  \"degenerative disc disease and failed back syndrome\"    Fibromyalgia    Herniated disc    High blood pressure    MRSA (methicillin resistant staph aureus) culture positive    Neuropathy    hands, left leg worse than right    Osteoarthritis    everywhere including tail bone    Prediabetes    borderline    Raynaud disease    Screening for human papillomavirus (HPV)    normal    Visual impairment    contacts/glasses         PAST SURGICAL HISTORY:     Past Surgical History:   Procedure Laterality Date    Anesth,skin surgery,neck      Appendectomy      Back surgery      multiple    Carpal tunnel release      bilateral    Hand/finger surgery unlisted      left hand    Hit pain imp pump tyree      Hysterectomy      Knee surgery      right knee    Other      anterior removal of cervical instrumentation; C3-4 anterior cervical discectomy and fusion, with structural allograft and plate instrumentation    Other  2009,2014?    2 cervical neck surgeries    Other   10/26/16    intrathecal morphine pump place per Dr. Bustillo    Other surgical history      per [employer]:  \"Mechanical complication of Nervous System device & implant graft.\"    Other surgical history      per [employer]:  'Oopherectomy\"    Other surgical history  1994    per [employer]:  \"Lumbar hemilaminectomy L3-4 on the left\"    Other surgical history  1999    per [employer]:  \"L2, L3 partial laminectomy\"    Other surgical history  1999    per [employer]:  \"Placement of intrathecal morphine pump through rt.\"    Other surgical history  1989 - 1999    per [employer]:  \"low back surgeries x's 8\"    Other surgical history  2010    per [employer]:  \"left shoulder surgery x's 2\"    Other surgical history  2010    per [employer]:  \"ACDF\":    Other surgical history  2012    per [employer]:  \"removal of pain med pump\"    Other surgical history      per [employer]:  \"Arthrocentesis of the left shoulder joint\" x2    Other surgical history      per [employer]:  \"Arthrocentesis of the left shoulder bicipital tendon\"    Revise ulnar nerve at elbow      left         CURRENT MEDICATIONS:     Current Outpatient Medications   Medication Sig Dispense Refill    [START ON 6/30/2024] HYDROcodone-acetaminophen  MG Oral Tab Take 1 tablet by mouth every 6 (six) hours as needed for Pain. 120 tablet 0    morphINE (PF) 400 mg in sodium chloride 0.9% (PF) 40 mL IT infusion 40 mL by Intrathecal route continuous. (Forty mL) 1 each 0    methocarbamol 750 MG Oral Tab TAKE 2 TABLETS EVERY 8 HOURS AS NEEDED. 540 tablet 3    Triamterene-HCTZ 37.5-25 MG Oral Cap Take 1 capsule by mouth daily.  5    Multiple Vitamin (MULTI-VITAMINS) Oral Tab Take 1 tablet by mouth daily.           ALLERGIES:     Allergies   Allergen Reactions    Shellfish-Derived Products TONGUE SWELLING    Cyclobenzaprine NAUSEA AND VOMITING     Upset stomach    Latex OTHER (SEE COMMENTS)     blisters    Tizanidine NAUSEA AND VOMITING    Baclofen NAUSEA AND VOMITING         FAMILY HISTORY:     Family  History   Problem Relation Age of Onset    Cancer Mother         Lung cancer    Diabetes Mother     Arthritis Mother     Other (Other) Mother         shi's syndrom/CREST syndrome    Alcohol and Other Disorders Associated Brother     Stroke Brother     Other (gout) Father     Other (RA) Maternal Uncle     Diabetes Other         Family h/o Diabetes mellitus    Other (Other) Other         Family h    Other (RA) Maternal Grandmother     Other (RA) Paternal Grandmother           SOCIAL HISTORY:     Social History     Socioeconomic History    Marital status: Single   Tobacco Use    Smoking status: Former     Current packs/day: 0.00     Average packs/day: 0.5 packs/day for 10.0 years (5.0 ttl pk-yrs)     Types: Cigarettes     Start date: 2002     Quit date: 2012     Years since quittin.4    Smokeless tobacco: Never   Vaping Use    Vaping status: Never Used   Substance and Sexual Activity    Alcohol use: No     Alcohol/week: 0.0 standard drinks of alcohol    Drug use: Never    Sexual activity: Not Currently   Other Topics Concern    Caffeine Concern Yes     Comment: 1 cup coffee daily    Exercise Yes     Comment: home exercises in bed   Social History Narrative    The patient uses the following assistive device(s):  tri-point cane.      The patient does not live in a home with stairs.          REVIEW OF SYSTEMS:   As per above HPI    PHYSICAL EXAM:   General: No immediate distress  Head: Normocephalic/ Atraumatic  Eyes: Extra-occular movements intact  Ears/Nose/Throat:  External appearance identifies normal appearance without obvious deformity  Cardiovascular: No cyanosis, clubbing or edema  Respiratory: Non-labored respirations  Skin: No lesions noted   Neurological: alert & oriented x 3, attentive, able to follow commands, comprehention intact, spontaneous speech intact  Psychiatric: Mood and affect appropriate    Musculoskeletal Exam:  Gait:    Gait: Normal gait   Sit to Stand: no difficulty      LABS:    No results found for: \"EAG\", \"A1C\"  Lab Results   Component Value Date    WBC 8.0 05/30/2023    RBC 4.60 05/30/2023    HGB 13.9 05/30/2023    HCT 42.7 05/30/2023    MCV 92.8 05/30/2023    MCH 30.2 05/30/2023    MCHC 32.6 05/30/2023    RDW 12.2 05/30/2023    .0 05/30/2023    MPV 8.5 06/08/2017     Lab Results   Component Value Date     (H) 05/30/2023    BUN 16 05/30/2023    BUNCREA 20.3 (H) 05/30/2023    CREATSERUM 0.79 05/30/2023    ANIONGAP 3 05/30/2023     10/28/2016    GFRNAA 73 05/06/2022    GFRAA 84 05/06/2022    CA 9.8 05/30/2023    OSMOCALC 285 05/30/2023    ALKPHO 55 05/30/2023    AST 26 05/30/2023    ALT 28 05/30/2023    ALKPHOS 58 06/02/2015    BILT 0.4 05/30/2023    TP 7.6 05/30/2023    ALB 3.9 05/30/2023    GLOBULIN 3.7 05/30/2023    AGRATIO 1.4 06/02/2015     05/30/2023    K 4.9 05/30/2023    CL 99 05/30/2023    CO2 35.0 (H) 05/30/2023     Lab Results   Component Value Date    PTP 11.7 05/06/2022    PT 11.4 (L) 06/08/2015    INR 0.85 05/06/2022     No results found for: \"VITD\", \"QVITD\", \"[id number]\"    ASSESSMENT:     1. L2-3 bilateral mod far latera, L3-4/L4-5 right mild-mod far lateral bulging discs    2. L5-S1 left mod foraminal HNP    3. L5-S1 left mod foraminal stenosis     4. left > right L5-S1 chronic radiculopathy        PLAN:   She will continue with the Robaxin and the Norco for the pain.    The patient will continue with her home exercise program.    Her labs were reviewed and there is no need to adjust her medications.    Follow-up:  6 months    We discussed that a telemedicine visit is in place of an office visit; however, this limits the ability to perform a thorough physical examination which may affect objective findings related to a specific condition and can affect treatment.       The patient verbalized understanding with this plan and was in agreement.  There are no barriers to learning.  All questions were answered.      Homer Gibson M.D.  Physical  Medicine and Rehabilitation   6/27/2024    Telehealth outside of [facility]  Telehealth Verbal Consent   I conducted a telehealth visit with Karine Ramesh today, 06/27/24, which was completed using two-way, real-time interactive audio and video communication. This has been done in good lay to provide continuity of care in the best interest of the provider-patient relationship, due to the COVID - public health crisis/national emergency where restrictions of face-to-face office visits are ongoing. Every conscious effort was taken to allow for sufficient and adequate time to complete the visit.  The patient was made aware of the limitations of the telehealth visit, including treatment limitations as no physical exam could be performed.  The patient was advised to call 911 or to go to the ER in case there was an emergency.  The patient was also advised of the potential privacy & security concerns related to the telehealth platform.   The patient was made aware of where to find [facility]'s notice of privacy practices, telehealth consent form and other related consent forms and documents.  which are located on the [facility] website. The patient verbally agreed to telehealth consent form, related consents and the risks discussed.    Lastly, the patient confirmed that they were in [place].   Included in this visit, time may have been spent reviewing labs, medications, radiology tests and decision making. Appropriate medical decision-making and tests are ordered as detailed in the plan of care above.  Coding/billing information is submitted for this visit based on complexity of care and/or time spent for the visit.

## 2024-06-27 NOTE — TELEPHONE ENCOUNTER
Refill Request    Medication request: HYDROcodone-acetaminophen  MG Oral Tab Take 1 tablet by mouth every 6 (six) hours as needed for Pain.     LOV:23 - televisit   Due back to clinic per last office note:  \"Follow-up:  6 months \"  NOV: 2024 Homer Gibson MD  TODAY video visit     [place] #120 - 30 day supply    Urine drug screen (if applicable): 23  Pain contract:  on 24    LOV plan (if weaning or changing medications): Per  at LOV: \"She is stable with the Robaxin and the Norco. She is taking 4 of the Norco per day. \" \"The patient will continue with their current pain medications. \"

## 2024-07-27 DIAGNOSIS — M54.16 LUMBAR RADICULOPATHY: ICD-10-CM

## 2024-07-29 RX ORDER — HYDROCODONE BITARTRATE AND ACETAMINOPHEN 10; 325 MG/1; MG/1
1 TABLET ORAL EVERY 6 HOURS PRN
Qty: 120 TABLET | Refills: 0 | Status: SHIPPED | OUTPATIENT
Start: 2024-07-29 | End: 2024-08-28

## 2024-07-29 NOTE — TELEPHONE ENCOUNTER
Refill Request    Medication request:   HYDROcodone-acetaminophen  MG Oral Tab       LOV: 2024 Homer Gibson MD   RTC: 6 months  NOV: Visit date not found      [place]: 20[place] #30 days      UDS: (if applicable): 2023  Pain contract:  2024    LOV plan (if weaning or changing medications): She will continue with the Robaxin and the Norco for the pain.

## 2024-08-07 ENCOUNTER — OFFICE VISIT (OUTPATIENT)
Dept: PAIN CLINIC | Facility: CLINIC | Age: 59
End: 2024-08-07
Payer: MEDICARE

## 2024-08-07 VITALS
DIASTOLIC BLOOD PRESSURE: 54 MMHG | BODY MASS INDEX: 17 KG/M2 | WEIGHT: 98 LBS | OXYGEN SATURATION: 93 % | HEART RATE: 88 BPM | SYSTOLIC BLOOD PRESSURE: 98 MMHG

## 2024-08-07 DIAGNOSIS — Z97.8 PRESENCE OF INTRATHECAL PUMP: Primary | ICD-10-CM

## 2024-08-07 DIAGNOSIS — G89.29 OTHER CHRONIC PAIN: ICD-10-CM

## 2024-08-07 PROCEDURE — 62370 ANL SP INF PMP W/MDREPRG&FIL: CPT | Performed by: PHYSICIAN ASSISTANT

## 2024-08-07 NOTE — PROGRESS NOTES
Timeout completed prior to procedure @ 3009.  Participants present for timeout: Mick MAKI, RN Margo, VIOLA Lester, and patient.    Expected waste amount: 5.7 mL    Actual waste amount: 8.5mL

## 2024-08-07 NOTE — PATIENT INSTRUCTIONS
Refill policies:    Allow 2-3 business days for refills; controlled substances may take longer.  Contact your pharmacy at least 5 days prior to running out of medication and have them send an electronic request or submit request through the “request refill” option in your [employer] account.  Refills are not addressed on weekends; covering physicians do not authorize routine medications on weekends.  No narcotics or controlled substances are refilled after noon on Fridays or by on call physicians.  By law, narcotics must be electronically prescribed.  A 30 day supply with no refills is the maximum allowed.  If your prescription is due for a refill, you may be due for a follow up appointment.  To best provide you care, patients receiving routine medications need to be seen at least once a year.  Patients receiving narcotic/controlled substance medications need to be seen at least once every 3 months.  In the event that your preferred pharmacy does not have the requested medication in stock (e.g. Backordered), it is your responsibility to find another pharmacy that has the requested medication available.  We will gladly send a new prescription to that pharmacy at your request.    Scheduling Tests:    If your physician has ordered radiology tests such as MRI or CT scans, please contact Central Scheduling at [phone] right away to schedule the test.  Once scheduled, the [facility] Centralized Referral Team will work with your insurance carrier to obtain pre-certification or prior authorization.  Depending on your insurance carrier, approval may take 3-10 days.  It is highly recommended patients assure they have received an authorization before having a test performed.  If test is done without insurance authorization, patient may be responsible for the entire amount billed.      Precertification and Prior Authorizations:  If your physician has recommended that you have a procedure or additional testing performed the [facility]  Centralized Referral Team will contact your insurance carrier to obtain pre-certification or prior authorization.    You are strongly encouraged to contact your insurance carrier to verify that your procedure/test has been approved and is a COVERED benefit.  Although the [facility] Centralized Referral Team does its due diligence, the insurance carrier gives the disclaimer that \"Although the procedure is authorized, this does not guarantee payment.\"    Ultimately the patient is responsible for payment.   Thank you for your understanding in this matter.  Paperwork Completion:  If you require FMLA or disability paperwork for your recovery, please make sure to either drop it off or have it faxed to our office at [phone]. Be sure the form has your name and date of birth on it.  The form will be faxed to our Forms Department and they will complete it for you.  There is a 25$ fee for all forms that need to be filled out.  Please be aware there is a 10-14 day turnaround time.  You will need to sign a release of information (LINSEY) form if your paperwork does not come with one.  You may call the Forms Department with any questions at [phone].  Their fax number is [phone].

## 2024-08-07 NOTE — PROGRESS NOTES
HPI:   Karine Ramesh presents for intra-thecal pump medication refill.   She presents with complaints of Low back pain.    The pain is described as moderate aching that is constant .  The patient’s activity level has remained the same since last visit.  The pain is worst unrelated to time of day.    Changes in condition/history since last visit: here for refill of IT pump.  She is stable, and requires no adjustments.      Of note, IT pump replacement 5/9/22 with Dr. Bustillo.      The following activities will increase the patient’s pain: any prolonged activity    The following activities decrease the patient’s pain: limiting activity level    Functional Assessment: Patient reports that they are able to complete all of their ADL's such as eating, bathing, using the toilet, dressing and getting up from a bed or a chair independently.    Current Medications:  Current Outpatient Medications   Medication Sig Dispense Refill    morphINE (PF) 400 mg in sodium chloride 0.9% (PF) 40 mL IT infusion 40 mL by Intrathecal route continuous. (Forty mL) 1 each 0    HYDROcodone-acetaminophen  MG Oral Tab Take 1 tablet by mouth every 6 (six) hours as needed for Pain. 120 tablet 0    methocarbamol 750 MG Oral Tab TAKE 2 TABLETS EVERY 8 HOURS AS NEEDED. 540 tablet 3    Triamterene-HCTZ 37.5-25 MG Oral Cap Take 1 capsule by mouth daily.  5    Multiple Vitamin (MULTI-VITAMINS) Oral Tab Take 1 tablet by mouth daily.        Side effects of medications: None    Patient requires assistance with: No assistance required    Reviewed Patient History Dated: 5/16/24 no changes noted  Have you recently had any feelings of harming yourself or others? The patient's response was no.    Current Pump Information:  Medication and concentration of medication currently in pump: morphine 10 mg/mL  Continuous Rate: 3.594 mg per 24 hours  PA dosing: Enabled  PA dose 0.200 mg every 2 hours., Maximum delivery of 4 doses per 24 hours., If all PA doses  utilized then patient will receive 4.374 mg per 24 hours. and This would be a 21.7 % increase over the simple continous dose for 24 hour period.  Reservoir Volume: 40 mL  Low Reservoir Volume: 2 mL  Telemetry Volume: 5.7 mL    New Pump Information:  Medication and concentration of medication being placed in pump:  Same as current concentration and medication  Actual residual volume: 8.5  Waste witnessed by: ANTOINETTE (initials)  New Continuous Rate: 3.594 mg per 24 hours  PA dosing: Enabled  PA dose 0.200 mg every 2 hours., Maximum delivery of 4 doses per 24 hours., If all PA doses utilized then patient will receive 4.374 mg per 24 hours. and This would be a 21.7 % increase over the simple continous dose for 24 hour period.  Reservoir Volume: 40 mL  Low Reservoir Alarm Volume: 2 mL  New Reservoir Alarm Date: 11/1/24    Technique:  Informed consent obtained.  Patient placed in the supine position and chloraprep was performed over the pump site.  Using sterile technique, the pump reservoir was accessed via a 22 gauge Day needle and 8.5 cc of clear fluid was withdrawn and wasted with witness.  The pump was refilled with 40cc of 10 mg/mL morphine.  Needle was removed intact.  The pump was reprogrammed to reflect this new volume.  New alarm date noted to patient.  Band-Aid applied to injection site.  Patient tolerated the procedure well, No evidence of bleeding noted      Physical Exam:   Vitals: There were no vitals taken for this visit.  VAS Pain Score:  3-4/10  General Appearance: Well developed, well nourished, normal build, independent body habitus, no apparent physical disabilities, well groomed    Neurological Exam: WNL-Orientation to time, place and person, normal mood & effect, normal concentration & attention span    Inspection: non-antalgic, no acute distress    Radiology/Lab Test Reviewed: no new imaging    Medical Decision Making:   Diagnosis:    Encounter Diagnoses   Name Primary?    Presence of intrathecal pump  Yes    Other chronic pain        Impression: Here today for IT pump refill, and did refill pump without changes to rate or concentration.  Reprogrammed to reflect new alarm date/volume.        Plan: refill IT pump without complication.  Reprogrammed to reflect new alarm date/volume.  F/u prior to above listed alarm date.        No orders of the defined types were placed in this encounter.      Meds & Refills for this Visit:  Requested Prescriptions      No prescriptions requested or ordered in this encounter       Imaging & Consults:  None    The patient indicates understanding of these issues and agrees to the plan.    GLYNN Guevara

## 2024-08-25 DIAGNOSIS — M54.16 LUMBAR RADICULOPATHY: ICD-10-CM

## 2024-08-27 RX ORDER — HYDROCODONE BITARTRATE AND ACETAMINOPHEN 10; 325 MG/1; MG/1
1 TABLET ORAL EVERY 6 HOURS PRN
Qty: 120 TABLET | Refills: 0 | Status: SHIPPED | OUTPATIENT
Start: 2024-08-27 | End: 2024-09-26

## 2024-09-11 NOTE — TELEPHONE ENCOUNTER
ITP REFILL due by 5/23/2024.  Please place order for ITP Rx.  Last medication ordered:  morphINE (PF) 400 mg in sodium chloride 0.9% (PF) 40 mL IT infusion     OV scheduled 5/16/2024    E-Scribe Date: 5/13/2024   Sheath was removed. Site closed by TR-Band.

## 2024-09-13 ENCOUNTER — TELEPHONE (OUTPATIENT)
Dept: PAIN CLINIC | Facility: CLINIC | Age: 59
End: 2024-09-13

## 2024-09-13 DIAGNOSIS — M96.1 FAILED BACK SYNDROME OF LUMBAR SPINE: ICD-10-CM

## 2024-09-13 NOTE — TELEPHONE ENCOUNTER
ITP REFILL due by 11/1/2024.  Please place order for ITP Rx.  Last medication ordered:  morphINE (PF) 400 mg in sodium chloride 0.9% (PF) 40 mL IT infusion     OV scheduled 10/29/2024    E-Scribe Date: 10/23/2024

## 2024-09-27 ENCOUNTER — PATIENT MESSAGE (OUTPATIENT)
Dept: PHYSICAL MEDICINE AND REHAB | Facility: CLINIC | Age: 59
End: 2024-09-27

## 2024-09-27 DIAGNOSIS — M54.16 LUMBAR RADICULOPATHY: ICD-10-CM

## 2024-09-30 RX ORDER — HYDROCODONE BITARTRATE AND ACETAMINOPHEN 10; 325 MG/1; MG/1
1 TABLET ORAL EVERY 6 HOURS PRN
Qty: 120 TABLET | Refills: 0 | Status: SHIPPED | OUTPATIENT
Start: 2024-09-30 | End: 2024-10-30

## 2024-09-30 NOTE — TELEPHONE ENCOUNTER
Refill Request    Medication request: HYDROcodone-acetaminophen  MG Oral Tab  Take 1 tablet by mouth every 6 (six) hours as needed for Pain.     LOV:2024- televisit Homer Gibson MD   Due back to clinic per last office note:  \"Follow-up:  6 months\"  NOV: 2025 Homer Gibson MD      [place] 24 #120 - 30 day supply    Urine drug screen (if applicable): 23  Pain contract:  on 24    LOV plan (if weaning or changing medications): Per  at last office visit: \"She will continue with the Robaxin and the Norco for the pain. \"

## 2024-10-11 RX ORDER — METHOCARBAMOL 750 MG/1
TABLET, FILM COATED ORAL
Qty: 540 TABLET | Refills: 3 | Status: SHIPPED | OUTPATIENT
Start: 2024-10-11

## 2024-10-11 NOTE — TELEPHONE ENCOUNTER
Refill Request    Medication request: methocarbamol 750 MG Oral Tab TAKE 2 TABLETS EVERY 8 HOURS AS NEEDED.     LOV:2024 (Telemed) Homer Gibson MD   Due back to clinic per last office note:  Follow-up:  6 months   NOV: 2025 Homer Gibson MD      [place] 2024 #540 (90 day)    Urine drug screen (if applicable): 2023  Pain contract:  on 2024    LOV plan (if weaning or changing medications): Per Dr. Gibson's LOV notes: \"She will continue with the Robaxin and the Norco for the pain. \"

## 2024-10-25 NOTE — TELEPHONE ENCOUNTER
ITP PUMP Medication received.  Verified by nelson ROD  and Tayler ROD.  Dose correct and medication stored in lock box.    Rx #: [id number]  LOT #: [id number]  Expiration date:   11/7/2024

## 2024-10-25 NOTE — TELEPHONE ENCOUNTER
ITP PUMP Medication received.  Verified by Tayler ROD and Laura RN.  Dose correct and medication stored in lock box.    Rx #: [id number]  LOT #: [id number]  Expiration date:   11/7/2024

## 2024-10-27 DIAGNOSIS — M54.16 LUMBAR RADICULOPATHY: ICD-10-CM

## 2024-10-28 NOTE — TELEPHONE ENCOUNTER
Refill Request    Medication request:   HYDROcodone-acetaminophen  MG Oral Tab         LOV: 2024 Homer Gibson MD   RTC: 6 months  NOV: 2025 Homer Gibson MD      [place]: 10/1/2024 #30 days    UDS: (if applicable): 2023  Pain contract:  2024    LOV plan (if weaning or changing medications): She will continue with the Robaxin and the Norco for the pain.

## 2024-10-29 ENCOUNTER — OFFICE VISIT (OUTPATIENT)
Dept: PAIN CLINIC | Facility: CLINIC | Age: 59
End: 2024-10-29
Payer: MEDICARE

## 2024-10-29 VITALS — DIASTOLIC BLOOD PRESSURE: 60 MMHG | OXYGEN SATURATION: 92 % | SYSTOLIC BLOOD PRESSURE: 102 MMHG | HEART RATE: 83 BPM

## 2024-10-29 DIAGNOSIS — G89.29 OTHER CHRONIC PAIN: Primary | ICD-10-CM

## 2024-10-29 DIAGNOSIS — Z97.8 PRESENCE OF INTRATHECAL PUMP: ICD-10-CM

## 2024-10-29 PROCEDURE — 62370 ANL SP INF PMP W/MDREPRG&FIL: CPT | Performed by: PHYSICIAN ASSISTANT

## 2024-10-29 NOTE — PROGRESS NOTES
HPI:   Karine Ramesh presents for intra-thecal pump medication refill.   She presents with complaints of Low back pain.    The pain is described as moderate aching that is constant .  The patient’s activity level has remained the same since last visit.  The pain is worst unrelated to time of day.    Changes in condition/history since last visit: here for refill of IT pump.  She is stable, and requires no adjustments.      Of note, IT pump replacement 5/9/22 with Dr. Bustillo.      The following activities will increase the patient’s pain: any prolonged activity    The following activities decrease the patient’s pain: limiting activity level    Functional Assessment: Patient reports that they are able to complete all of their ADL's such as eating, bathing, using the toilet, dressing and getting up from a bed or a chair independently.    Current Medications:  Current Outpatient Medications   Medication Sig Dispense Refill    morphINE (PF) 400 mg in sodium chloride 0.9% (PF) 40 mL IT infusion 40 mL by Intrathecal route continuous. (Forty mL) 1 each 0    METHOCARBAMOL 750 MG Oral Tab TAKE 2 TABLETS EVERY 8     HOURS AS NEEDED 540 tablet 3    HYDROcodone-acetaminophen  MG Oral Tab Take 1 tablet by mouth every 6 (six) hours as needed for Pain. 120 tablet 0    Triamterene-HCTZ 37.5-25 MG Oral Cap Take 1 capsule by mouth daily.  5    Multiple Vitamin (MULTI-VITAMINS) Oral Tab Take 1 tablet by mouth daily.        Side effects of medications: None    Patient requires assistance with: No assistance required    Reviewed Patient History Dated: 8/7/24 no changes noted  Have you recently had any feelings of harming yourself or others? The patient's response was no.    Current Pump Information:  Medication and concentration of medication currently in pump: morphine 10 mg/mL  Continuous Rate: 3.594 mg per 24 hours  PA dosing: Enabled  PA dose 0.200 mg every 2 hours., Maximum delivery of 4 doses per 24 hours., If all PA doses  utilized then patient will receive 4.374 mg per 24 hours. and This would be a 21.7 % increase over the simple continous dose for 24 hour period.  Reservoir Volume: 40 mL  Low Reservoir Volume: 2 mL  Telemetry Volume: 5.7 mL    New Pump Information:  Medication and concentration of medication being placed in pump:  Same as current concentration and medication  Actual residual volume: 8.1  Waste witnessed by: ANTOINETTE (initials)  New Continuous Rate: 3.594 mg per 24 hours  PA dosing: Enabled  PA dose 0.200 mg every 2 hours., Maximum delivery of 4 doses per 24 hours., If all PA doses utilized then patient will receive 4.374 mg per 24 hours. and This would be a 21.7 % increase over the simple continous dose for 24 hour period.  Reservoir Volume: 40 mL  Low Reservoir Alarm Volume: 2 mL  New Reservoir Alarm Date: 1/23/25    Technique:  Informed consent obtained.  Patient placed in the supine position and chloraprep was performed over the pump site.  Using sterile technique, the pump reservoir was accessed via a 22 gauge Day needle and 8.1 cc of clear fluid was withdrawn and wasted with witness.  The pump was refilled with 40cc of 10 mg/mL morphine.  Needle was removed intact.  The pump was reprogrammed to reflect this new volume.  New alarm date noted to patient.  Band-Aid applied to injection site.  Patient tolerated the procedure well, No evidence of bleeding noted      Physical Exam:   Vitals: /60 (BP Location: Left arm, Patient Position: Sitting, Cuff Size: adult)   Pulse 83   SpO2 92%   VAS Pain Score:  3-4/10  General Appearance: Well developed, well nourished, normal build, independent body habitus, no apparent physical disabilities, well groomed    Neurological Exam: WNL-Orientation to time, place and person, normal mood & effect, normal concentration & attention span    Inspection: non-antalgic, no acute distress    Radiology/Lab Test Reviewed: no new imaging    Medical Decision Making:   Diagnosis:     Encounter Diagnoses   Name Primary?    Other chronic pain Yes    Presence of intrathecal pump        Impression: Here today for IT pump refill, and did refill pump without changes to rate or concentration.  Reprogrammed to reflect new alarm date/volume.        Plan: refill IT pump without complication.  Reprogrammed to reflect new alarm date/volume.  F/u prior to above listed alarm date.        No orders of the defined types were placed in this encounter.      Meds & Refills for this Visit:  Requested Prescriptions      No prescriptions requested or ordered in this encounter       Imaging & Consults:  None    The patient indicates understanding of these issues and agrees to the plan.    GLYNN Guevara

## 2024-10-29 NOTE — PATIENT INSTRUCTIONS
Refill policies:    Allow 2-3 business days for refills; controlled substances may take longer.  Contact your pharmacy at least 5 days prior to running out of medication and have them send an electronic request or submit request through the “request refill” option in your [employer] account.  Refills are not addressed on weekends; covering physicians do not authorize routine medications on weekends.  No narcotics or controlled substances are refilled after noon on Fridays or by on call physicians.  By law, narcotics must be electronically prescribed.  A 30 day supply with no refills is the maximum allowed.  If your prescription is due for a refill, you may be due for a follow up appointment.  To best provide you care, patients receiving routine medications need to be seen at least once a year.  Patients receiving narcotic/controlled substance medications need to be seen at least once every 3 months.  In the event that your preferred pharmacy does not have the requested medication in stock (e.g. Backordered), it is your responsibility to find another pharmacy that has the requested medication available.  We will gladly send a new prescription to that pharmacy at your request.    Scheduling Tests:    If your physician has ordered radiology tests such as MRI or CT scans, please contact Central Scheduling at [phone] right away to schedule the test.  Once scheduled, the [facility] Centralized Referral Team will work with your insurance carrier to obtain pre-certification or prior authorization.  Depending on your insurance carrier, approval may take 3-10 days.  It is highly recommended patients assure they have received an authorization before having a test performed.  If test is done without insurance authorization, patient may be responsible for the entire amount billed.      Precertification and Prior Authorizations:  If your physician has recommended that you have a procedure or additional testing performed the [facility]  Centralized Referral Team will contact your insurance carrier to obtain pre-certification or prior authorization.    You are strongly encouraged to contact your insurance carrier to verify that your procedure/test has been approved and is a COVERED benefit.  Although the [facility] Centralized Referral Team does its due diligence, the insurance carrier gives the disclaimer that \"Although the procedure is authorized, this does not guarantee payment.\"    Ultimately the patient is responsible for payment.   Thank you for your understanding in this matter.  Paperwork Completion:  If you require FMLA or disability paperwork for your recovery, please make sure to either drop it off or have it faxed to our office at [phone]. Be sure the form has your name and date of birth on it.  The form will be faxed to our Forms Department and they will complete it for you.  There is a 25$ fee for all forms that need to be filled out.  Please be aware there is a 10-14 day turnaround time.  You will need to sign a release of information (LINSEY) form if your paperwork does not come with one.  You may call the Forms Department with any questions at [phone].  Their fax number is [phone].

## 2024-10-29 NOTE — PROGRESS NOTES
Timeout completed prior to procedure @ 3738.  Participants present for timeout:  Mick MAKI, VIOLA Lester, and patient.      Expected Waste Amt: 5.7 mL    Actual Waste Amt: 8.1 mL

## 2024-10-29 NOTE — PROGRESS NOTES
Patient presents in office today with reported pain in back    Current pain level reported = 3/10    Last reported dose of ITP      Narcotic Contract renewal n/a    Urine Drug screen n/a

## 2024-10-31 RX ORDER — HYDROCODONE BITARTRATE AND ACETAMINOPHEN 10; 325 MG/1; MG/1
1 TABLET ORAL EVERY 6 HOURS PRN
Qty: 120 TABLET | Refills: 0 | Status: SHIPPED | OUTPATIENT
Start: 2024-10-31 | End: 2024-11-30

## 2024-11-22 DIAGNOSIS — M54.16 LUMBAR RADICULOPATHY: ICD-10-CM

## 2024-11-22 NOTE — TELEPHONE ENCOUNTER
Refill Request    Medication request: HYDROcodone-acetaminophen  MG Oral Tab. Take 1 tablet by mouth every 6 (six) hours as needed for Pain.     LOV:2024 (TELEVISIT) Homer Gibson MD   Due back to clinic per last office note: Per Dr. Gibson: \"Follow-up:  6 months\"  NOV: 2025 Homer Gibson MD      [place]: 20[place] #120    Urine drug screen (if applicable): 2023  Pain contract:  on: 2024. Note has already been added to upcoming OV appt notes that UDS/PC is required.    LOV plan (if weaning or changing medications): Per Dr. Gibson: \"She will continue with the Robaxin and the Norco for the pain.\"

## 2024-11-25 RX ORDER — HYDROCODONE BITARTRATE AND ACETAMINOPHEN 10; 325 MG/1; MG/1
1 TABLET ORAL EVERY 6 HOURS PRN
Qty: 120 TABLET | Refills: 0 | Status: SHIPPED | OUTPATIENT
Start: 2024-11-25 | End: 2024-12-25

## 2024-12-10 ENCOUNTER — TELEPHONE (OUTPATIENT)
Dept: PAIN CLINIC | Facility: CLINIC | Age: 59
End: 2024-12-10

## 2024-12-10 DIAGNOSIS — M96.1 FAILED BACK SYNDROME OF LUMBAR SPINE: ICD-10-CM

## 2024-12-10 NOTE — TELEPHONE ENCOUNTER
ITP REFILL due by 1/23/2025.  Please place order for ITP Rx.  Last medication ordered:  morphINE (PF) 400 mg in sodium chloride 0.9% (PF) 40 mL IT infusion     OV scheduled 1/21/2025    E-Scribe Date: 1/15/25

## 2024-12-20 DIAGNOSIS — M54.16 LUMBAR RADICULOPATHY: ICD-10-CM

## 2024-12-23 RX ORDER — HYDROCODONE BITARTRATE AND ACETAMINOPHEN 10; 325 MG/1; MG/1
1 TABLET ORAL EVERY 6 HOURS PRN
Qty: 120 TABLET | Refills: 0 | Status: SHIPPED | OUTPATIENT
Start: 2024-12-29 | End: 2025-01-28

## 2024-12-23 NOTE — TELEPHONE ENCOUNTER
Refill Request    Medication request: HYDROcodone-acetaminophen  MG Oral Tab  Take 1 tablet by mouth every 6 (six) hours as needed for Pain.     LOV:2024- televist Homer Gibson MD   Due back to clinic per last office note:  \"Follow-up:  6 months \"  NOV: 2025 Homer Gibson MD      [place] 24 #120 - 30 day supply    Urine drug screen (if applicable): 23  Pain contract:  on 24    LOV plan (if weaning or changing medications): Per  at last office visit: \"She will continue with the Robaxin and the Norco for the pain. \"

## 2025-01-17 NOTE — TELEPHONE ENCOUNTER
ITP PUMP Medication received.  Verified by Tayler ROD and Laura RN.  Dose correct and medication stored in lock box.    Rx #: [id number]  LOT #: [id number]  Expiration date:   1/30/2025

## 2025-01-17 NOTE — TELEPHONE ENCOUNTER
ITP PUMP Medication received.  Verified by Laura ROD and Tayler ROD.  Dose correct and medication stored in lock box.    Rx #: [id number]  LOT #: [id number]  Expiration date:   1/30/2025

## 2025-01-20 ENCOUNTER — TELEMEDICINE (OUTPATIENT)
Dept: PHYSICAL MEDICINE AND REHAB | Facility: CLINIC | Age: 60
End: 2025-01-20
Payer: MEDICARE

## 2025-01-20 DIAGNOSIS — M48.061 LUMBAR FORAMINAL STENOSIS: ICD-10-CM

## 2025-01-20 DIAGNOSIS — Q76.1 CERVICAL FUSION SYNDROME: ICD-10-CM

## 2025-01-20 DIAGNOSIS — M51.9 LUMBAR DISC DISEASE: Primary | ICD-10-CM

## 2025-01-20 DIAGNOSIS — M50.90 CERVICAL DISC DISEASE: ICD-10-CM

## 2025-01-20 DIAGNOSIS — M54.16 LUMBAR RADICULOPATHY: ICD-10-CM

## 2025-01-20 DIAGNOSIS — M48.02 CERVICAL STENOSIS OF SPINE: ICD-10-CM

## 2025-01-20 DIAGNOSIS — M96.1 LUMBAR POST-LAMINECTOMY SYNDROME: ICD-10-CM

## 2025-01-20 DIAGNOSIS — Z97.8 PRESENCE OF INTRATHECAL PUMP: ICD-10-CM

## 2025-01-20 DIAGNOSIS — M51.26 LUMBAR HERNIATED DISC: ICD-10-CM

## 2025-01-21 ENCOUNTER — OFFICE VISIT (OUTPATIENT)
Dept: PAIN CLINIC | Facility: CLINIC | Age: 60
End: 2025-01-21
Payer: MEDICARE

## 2025-01-21 VITALS
BODY MASS INDEX: 17 KG/M2 | DIASTOLIC BLOOD PRESSURE: 62 MMHG | HEART RATE: 98 BPM | WEIGHT: 98 LBS | OXYGEN SATURATION: 99 % | SYSTOLIC BLOOD PRESSURE: 124 MMHG

## 2025-01-21 DIAGNOSIS — G89.29 OTHER CHRONIC PAIN: Primary | ICD-10-CM

## 2025-01-21 DIAGNOSIS — Z97.8 PRESENCE OF INTRATHECAL PUMP: ICD-10-CM

## 2025-01-21 PROCEDURE — 62370 ANL SP INF PMP W/MDREPRG&FIL: CPT | Performed by: PHYSICIAN ASSISTANT

## 2025-01-21 NOTE — PROGRESS NOTES
Patient presents in office today with reported pain in chronic pain    Current pain level reported = 3/10    Last reported dose of ITP      Narcotic Contract renewal na    Urine Drug screen na

## 2025-01-21 NOTE — PATIENT INSTRUCTIONS
Refill policies:    Allow 2-3 business days for refills; controlled substances may take longer.  Contact your pharmacy at least 5 days prior to running out of medication and have them send an electronic request or submit request through the “request refill” option in your [employer] account.  Refills are not addressed on weekends; covering physicians do not authorize routine medications on weekends.  No narcotics or controlled substances are refilled after noon on Fridays or by on call physicians.  By law, narcotics must be electronically prescribed.  A 30 day supply with no refills is the maximum allowed.  If your prescription is due for a refill, you may be due for a follow up appointment.  To best provide you care, patients receiving routine medications need to be seen at least once a year.  Patients receiving narcotic/controlled substance medications need to be seen at least once every 3 months.  In the event that your preferred pharmacy does not have the requested medication in stock (e.g. Backordered), it is your responsibility to find another pharmacy that has the requested medication available.  We will gladly send a new prescription to that pharmacy at your request.    Scheduling Tests:    If your physician has ordered radiology tests such as MRI or CT scans, please contact Central Scheduling at [phone] right away to schedule the test.  Once scheduled, the [facility] Centralized Referral Team will work with your insurance carrier to obtain pre-certification or prior authorization.  Depending on your insurance carrier, approval may take 3-10 days.  It is highly recommended patients assure they have received an authorization before having a test performed.  If test is done without insurance authorization, patient may be responsible for the entire amount billed.      Precertification and Prior Authorizations:  If your physician has recommended that you have a procedure or additional testing performed the [facility]  Centralized Referral Team will contact your insurance carrier to obtain pre-certification or prior authorization.    You are strongly encouraged to contact your insurance carrier to verify that your procedure/test has been approved and is a COVERED benefit.  Although the [facility] Centralized Referral Team does its due diligence, the insurance carrier gives the disclaimer that \"Although the procedure is authorized, this does not guarantee payment.\"    Ultimately the patient is responsible for payment.   Thank you for your understanding in this matter.  Paperwork Completion:  If you require FMLA or disability paperwork for your recovery, please make sure to either drop it off or have it faxed to our office at [phone]. Be sure the form has your name and date of birth on it.  The form will be faxed to our Forms Department and they will complete it for you.  There is a 25$ fee for all forms that need to be filled out.  Please be aware there is a 10-14 day turnaround time.  You will need to sign a release of information (LINSEY) form if your paperwork does not come with one.  You may call the Forms Department with any questions at [phone].  Their fax number is [phone].

## 2025-01-21 NOTE — PROGRESS NOTES
Timeout completed prior to procedure @ 6620.  Participants present for timeout:Mick MAKI, RN Laura , and patient.     Estimated amount:5.2 ml  Wasted amount:9.9ml

## 2025-01-21 NOTE — PROGRESS NOTES
[facility]    Telemedicine Visit - Evaluation    Karine Ramesh verbally consents to a Telemedicine Visit on 01/20/25. This visit is conducted using Telemedicine with live, interactive audio and video.    Patient understands and accepts financial responsibility for any deductible, co-insurance and/or co-pays associated with this service.    Chief Complaint:   Chief Complaint   Patient presents with    Low Back Pain       HISTORY OF PRESENT ILLNESS:   The patient is a 59 year old female who was last seen on 6/27/2024.  She states that she is stable with her current pump doses and the Robaxin and the Norco.  She is not having any significant pain issues at this time.      PAST MEDICAL HISTORY:     Past Medical History:    Anxiety state, unspecified    Arthritis    Back problem    Borderline diabetic    Chronic pain    Depression    Failed back syndrome    per [employer]:  \"degenerative disc disease and failed back syndrome\"    Fibromyalgia    Herniated disc    High blood pressure    MRSA (methicillin resistant staph aureus) culture positive    Neuropathy    hands, left leg worse than right    Osteoarthritis    everywhere including tail bone    Prediabetes    borderline    Raynaud disease    Screening for human papillomavirus (HPV)    normal    Visual impairment    contacts/glasses         PAST SURGICAL HISTORY:     Past Surgical History:   Procedure Laterality Date    Anesth,skin surgery,neck      Appendectomy      Back surgery      multiple    Carpal tunnel release      bilateral    Hand/finger surgery unlisted      left hand    Hit pain imp pump tyree      Hysterectomy      Knee surgery      right knee    Other      anterior removal of cervical instrumentation; C3-4 anterior cervical discectomy and fusion, with structural allograft and plate instrumentation    Other  2009,2014?    2 cervical neck surgeries    Other  10/26/16    intrathecal morphine pump place per   Keny    Other surgical history      per [employer]:  \"Mechanical complication of Nervous System device & implant graft.\"    Other surgical history      per [employer]:  'Oopherectomy\"    Other surgical history  1994    per [employer]:  \"Lumbar hemilaminectomy L3-4 on the left\"    Other surgical history  1999    per [employer]:  \"L2, L3 partial laminectomy\"    Other surgical history  1999    per [employer]:  \"Placement of intrathecal morphine pump through rt.\"    Other surgical history  1989 - 1999    per [employer]:  \"low back surgeries x's 8\"    Other surgical history  2010    per [employer]:  \"left shoulder surgery x's 2\"    Other surgical history  2010    per [employer]:  \"ACDF\":    Other surgical history  2012    per [employer]:  \"removal of pain med pump\"    Other surgical history      per [employer]:  \"Arthrocentesis of the left shoulder joint\" x2    Other surgical history      per [employer]:  \"Arthrocentesis of the left shoulder bicipital tendon\"    Revise ulnar nerve at elbow      left         CURRENT MEDICATIONS:     Current Outpatient Medications   Medication Sig Dispense Refill    morphINE (PF) 400 mg in sodium chloride 0.9% (PF) 40 mL IT infusion 40 mL by Intrathecal route continuous. (Forty mL) 1 each 0    HYDROcodone-acetaminophen  MG Oral Tab Take 1 tablet by mouth every 6 (six) hours as needed for Pain. 120 tablet 0    METHOCARBAMOL 750 MG Oral Tab TAKE 2 TABLETS EVERY 8     HOURS AS NEEDED 540 tablet 3    Triamterene-HCTZ 37.5-25 MG Oral Cap Take 1 capsule by mouth daily.  5    Multiple Vitamin (MULTI-VITAMINS) Oral Tab Take 1 tablet by mouth daily.           ALLERGIES:   Allergies[1]      FAMILY HISTORY:     Family History   Problem Relation Age of Onset    Cancer Mother         Lung cancer    Diabetes Mother     Arthritis Mother     Other (Other) Mother         shi's syndrom/CREST syndrome    Alcohol and Other Disorders Associated Brother     Stroke Brother     Other (gout) Father     Other (RA) Maternal Uncle      Diabetes Other         Family h/o Diabetes mellitus    Other (Other) Other         Family h    Other (RA) Maternal Grandmother     Other (RA) Paternal Grandmother           SOCIAL HISTORY:     Social History     Socioeconomic History    Marital status: Single   Tobacco Use    Smoking status: Former     Current packs/day: 0.00     Average packs/day: 0.5 packs/day for 10.0 years (5.0 ttl pk-yrs)     Types: Cigarettes     Start date: 2002     Quit date: 2012     Years since quittin.0    Smokeless tobacco: Never   Vaping Use    Vaping status: Never Used   Substance and Sexual Activity    Alcohol use: No     Alcohol/week: 0.0 standard drinks of alcohol    Drug use: Never    Sexual activity: Not Currently   Other Topics Concern    Caffeine Concern Yes     Comment: 1 cup coffee daily    Exercise Yes     Comment: home exercises in bed   Social History Narrative    The patient uses the following assistive device(s):  tri-point cane.      The patient does not live in a home with stairs.          REVIEW OF SYSTEMS:   As per above HPI    PHYSICAL EXAM:   General: No immediate distress  Head: Normocephalic/ Atraumatic  Eyes: Extra-occular movements intact  Ears/Nose/Throat:  External appearance identifies normal appearance without obvious deformity  Cardiovascular: No cyanosis, clubbing or edema  Respiratory: Non-labored respirations  Skin: No lesions noted   Neurological: alert & oriented x 3, attentive, able to follow commands, comprehention intact, spontaneous speech intact  Psychiatric: Mood and affect appropriate    Musculoskeletal Exam:  Gait:    Gait: Normal gait     Cervical Spine:    Posture: normal chin forward superiorly rotated protracted shoulder posture.   Shoulders: Level   Head: In neutral     LABS:   No results found for: \"EAG\", \"A1C\"  Lab Results   Component Value Date    WBC 8.0 2023    RBC 4.60 2023    HGB 13.9 2023    HCT 42.7 2023    MCV 92.8 2023    MCH 30.2  05/30/2023    MCHC 32.6 05/30/2023    RDW 12.2 05/30/2023    .0 05/30/2023    MPV 8.5 06/08/2017     Lab Results   Component Value Date     (H) 05/30/2023    BUN 16 05/30/2023    BUNCREA 20.3 (H) 05/30/2023    CREATSERUM 0.79 05/30/2023    ANIONGAP 3 05/30/2023     10/28/2016    GFRNAA 73 05/06/2022    GFRAA 84 05/06/2022    CA 9.8 05/30/2023    OSMOCALC 285 05/30/2023    ALKPHO 55 05/30/2023    AST 26 05/30/2023    ALT 28 05/30/2023    ALKPHOS 58 06/02/2015    BILT 0.4 05/30/2023    TP 7.6 05/30/2023    ALB 3.9 05/30/2023    GLOBULIN 3.7 05/30/2023    AGRATIO 1.4 06/02/2015     05/30/2023    K 4.9 05/30/2023    CL 99 05/30/2023    CO2 35.0 (H) 05/30/2023     Lab Results   Component Value Date    PTP 11.7 05/06/2022    PT 11.4 (L) 06/08/2015    INR 0.85 05/06/2022     No results found for: \"VITD\", \"QVITD\", \"RRWS10CB\"      ASSESSMENT:     1. L2-3 bilateral mod far latera, L3-4/L4-5 right mild-mod far lateral bulging discs    2. L5-S1 left mod foraminal HNP    3. L5-S1 left mod foraminal stenosis     4. left > right L5-S1 chronic radiculopathy    5. s/p C3-4-C7 ACDF    6. C3-4 mod central stenosis    7. C3-4 mod diffuse, C7-T1 left mild bulging discs    8. s/p left L3-5 laminectomies    9. Presence of intrathecal pump          PLAN:   She will continue with her current dose of the Robaxin and the Norco.      She will continue with the pump refills as she is currently doing them out at the [place] [facility].    She will continue with her current activities.    She will follow-up with me in 6 months or sooner if needed.  Her next follow-up appointment will be in the office.    We discussed that a telemedicine visit is in place of an office visit; however, this limits the ability to perform a thorough physical examination which may affect objective findings related to a specific condition and can affect treatment.       The patient verbalized understanding with this plan and was in  agreement.  There are no barriers to learning.  All questions were answered.      Homer Gibson M.D.  Physical Medicine and Rehabilitation   1/20/2025    Telehealth outside of [facility]  Telehealth Verbal Consent   I conducted a telehealth visit with Karine Ramesh today, 01/20/25, which was completed using two-way, real-time interactive audio and video communication. This has been done in good lay to provide continuity of care in the best interest of the provider-patient relationship, due to the COVID -19 public health crisis/national emergency where restrictions of face-to-face office visits are ongoing. Every conscious effort was taken to allow for sufficient and adequate time to complete the visit.  The patient was made aware of the limitations of the telehealth visit, including treatment limitations as no physical exam could be performed.  The patient was advised to call 911 or to go to the ER in case there was an emergency.  The patient was also advised of the potential privacy & security concerns related to the telehealth platform.   The patient was made aware of where to find [facility]'s notice of privacy practices, telehealth consent form and other related consent forms and documents.  which are located on the [facility] website. The patient verbally agreed to telehealth consent form, related consents and the risks discussed.    Lastly, the patient confirmed that they were in [place].   Included in this visit, time may have been spent reviewing labs, medications, radiology tests and decision making. Appropriate medical decision-making and tests are ordered as detailed in the plan of care above.  Coding/billing information is submitted for this visit based on complexity of care and/or time spent for the visit.       [1]   Allergies  Allergen Reactions    Shellfish-Derived Products TONGUE SWELLING    Cyclobenzaprine NAUSEA AND VOMITING     Upset stomach    Latex OTHER (SEE COMMENTS)     blisters    Tizanidine NAUSEA  AND VOMITING    Baclofen NAUSEA AND VOMITING

## 2025-01-21 NOTE — PROGRESS NOTES
HPI:   Karine Ramesh presents for intra-thecal pump medication refill.   She presents with complaints of Low back pain.    The pain is described as moderate aching that is constant .  The patient’s activity level has remained the same since last visit.  The pain is worst unrelated to time of day.    Changes in condition/history since last visit: here for refill of IT pump.  She is stable, and requires no adjustments.      Of note, IT pump replacement 5/9/22 with Dr. Bustillo.      The following activities will increase the patient’s pain: any prolonged activity    The following activities decrease the patient’s pain: limiting activity level    Functional Assessment: Patient reports that they are able to complete all of their ADL's such as eating, bathing, using the toilet, dressing and getting up from a bed or a chair independently.    Current Medications:  Current Outpatient Medications   Medication Sig Dispense Refill    morphINE (PF) 400 mg in sodium chloride 0.9% (PF) 40 mL IT infusion 40 mL by Intrathecal route continuous. (Forty mL) 1 each 0    HYDROcodone-acetaminophen  MG Oral Tab Take 1 tablet by mouth every 6 (six) hours as needed for Pain. 120 tablet 0    METHOCARBAMOL 750 MG Oral Tab TAKE 2 TABLETS EVERY 8     HOURS AS NEEDED 540 tablet 3    Triamterene-HCTZ 37.5-25 MG Oral Cap Take 1 capsule by mouth daily.  5    Multiple Vitamin (MULTI-VITAMINS) Oral Tab Take 1 tablet by mouth daily.        Side effects of medications: None    Patient requires assistance with: No assistance required    Reviewed Patient History Dated: 10/29/24 no changes noted  Have you recently had any feelings of harming yourself or others? The patient's response was no.    Current Pump Information:  Medication and concentration of medication currently in pump: morphine 10 mg/mL  Continuous Rate: 3.594 mg per 24 hours  PA dosing: Enabled  PA dose 0.200 mg every 2 hours., Maximum delivery of 4 doses per 24 hours., If all PA  doses utilized then patient will receive 4.374 mg per 24 hours. and This would be a 21.7 % increase over the simple continous dose for 24 hour period.  Reservoir Volume: 40 mL  Low Reservoir Volume: 2 mL  Telemetry Volume: 5.2 mL    New Pump Information:  Medication and concentration of medication being placed in pump:  Same as current concentration and medication  Actual residual volume: 9.9  Waste witnessed by: ANTOINETTE (initials)  New Continuous Rate: 3.594 mg per 24 hours  PA dosing: Enabled  PA dose 0.200 mg every 2 hours., Maximum delivery of 4 doses per 24 hours., If all PA doses utilized then patient will receive 4.374 mg per 24 hours. and This would be a 21.7 % increase over the simple continous dose for 24 hour period.  Reservoir Volume: 40 mL  Low Reservoir Alarm Volume: 2 mL  New Reservoir Alarm Date: 4/17/25    Technique:  Informed consent obtained.  Patient placed in the supine position and chloraprep was performed over the pump site.  Using sterile technique, the pump reservoir was accessed via a 22 gauge Day needle and 9.9 cc of clear fluid was withdrawn and wasted with witness.  The pump was refilled with 40cc of 10 mg/mL morphine.  Needle was removed intact.  The pump was reprogrammed to reflect this new volume.  New alarm date noted to patient.  Band-Aid applied to injection site.  Patient tolerated the procedure well, No evidence of bleeding noted      Physical Exam:   Vitals: There were no vitals taken for this visit.  VAS Pain Score:  3-4/10  General Appearance: Well developed, well nourished, normal build, independent body habitus, no apparent physical disabilities, well groomed    Neurological Exam: WNL-Orientation to time, place and person, normal mood & effect, normal concentration & attention span    Inspection: non-antalgic, no acute distress    Radiology/Lab Test Reviewed: no new imaging    Medical Decision Making:   Diagnosis:    Encounter Diagnoses   Name Primary?    Other chronic pain Yes     Presence of intrathecal pump        Impression: Here today for IT pump refill, and did refill pump without changes to rate or concentration.  Reprogrammed to reflect new alarm date/volume.        Plan: refill IT pump without complication.  Reprogrammed to reflect new alarm date/volume.  F/u prior to above listed alarm date.        No orders of the defined types were placed in this encounter.      Meds & Refills for this Visit:  Requested Prescriptions      No prescriptions requested or ordered in this encounter       Imaging & Consults:  None    The patient indicates understanding of these issues and agrees to the plan.    GLYNN Guevara

## 2025-01-26 DIAGNOSIS — M54.16 LUMBAR RADICULOPATHY: ICD-10-CM

## 2025-01-27 RX ORDER — HYDROCODONE BITARTRATE AND ACETAMINOPHEN 10; 325 MG/1; MG/1
1 TABLET ORAL EVERY 6 HOURS PRN
Qty: 120 TABLET | Refills: 0 | Status: SHIPPED | OUTPATIENT
Start: 2025-01-29 | End: 2025-02-28

## 2025-01-27 NOTE — TELEPHONE ENCOUNTER
Refill Request    Medication request: HYDROcodone-acetaminophen  MG Oral Tab   Take 1 tablet by mouth every 6 (six) hours as needed for Pain.     LOV:2025- televisit Homer Gibson MD   Due back to clinic per last office note:  \"She will follow-up with me in 6 months or sooner if needed. Her next follow-up appointment will be in the office. \"  NOV: Visit date not found      [place]: 24 #120 - 30 day supply    Urine drug screen (if applicable): 23  Pain contract:  on 24    LOV plan (if weaning or changing medications): Per  at last office visit: \"She will continue with her current dose of the Robaxin and the Norco. \"

## 2025-02-25 DIAGNOSIS — M54.16 LUMBAR RADICULOPATHY: ICD-10-CM

## 2025-02-26 NOTE — TELEPHONE ENCOUNTER
Refill Request    Medication request: HYDROcodone-acetaminophen  MG Oral Tab Take 1 tablet by mouth every 6 (six) hours as needed for Pain.     LOV:2025- televisit Homer Gibson MD   Due back to clinic per last office note:  \"She will follow-up with me in 6 months or sooner if needed. Her next follow-up appointment will be in the office. \"  NOV: Visit date not found      [place]: 25 #120 - 30 day supply    Urine drug screen (if applicable): 23  Pain contract:  on 24    LOV plan (if weaning or changing medications): Per  at last office visit: \"She will continue with her current dose of the Robaxin and the Norco. \"

## 2025-02-28 RX ORDER — HYDROCODONE BITARTRATE AND ACETAMINOPHEN 10; 325 MG/1; MG/1
1 TABLET ORAL EVERY 6 HOURS PRN
Qty: 120 TABLET | Refills: 0 | Status: SHIPPED | OUTPATIENT
Start: 2025-02-28 | End: 2025-03-30

## 2025-03-06 ENCOUNTER — TELEPHONE (OUTPATIENT)
Dept: PAIN CLINIC | Facility: CLINIC | Age: 60
End: 2025-03-06

## 2025-03-06 DIAGNOSIS — M96.1 FAILED BACK SYNDROME OF LUMBAR SPINE: ICD-10-CM

## 2025-03-06 NOTE — TELEPHONE ENCOUNTER
ITP REFILL due by 4/17/25.  Please place order for ITP Rx.  Last medication ordered:  morphINE (PF) 400 mg in sodium chloride 0.9% (PF) 40 mL IT infusion     OV scheduled 4/10/25    E-Scribe Date: 4/7/25

## 2025-03-25 DIAGNOSIS — M54.16 LUMBAR RADICULOPATHY: ICD-10-CM

## 2025-03-26 NOTE — TELEPHONE ENCOUNTER
Refill Request    Medication request: HYDROcodone-acetaminophen  MG Oral Tab Take 1 tablet by mouth every 6 (six) hours as needed for Pain.     LOV:2025 - televisit Homer Gibson MD   Due back to clinic per last office note:  \"She will follow-up with me in 6 months or sooner if needed. Her next follow-up appointment will be in the office. \"  NOV: Visit date not found      [place]: 25 #120 - 30 day supply    Urine drug screen (if applicable): 23  Pain contract:  on 24    LOV plan (if weaning or changing medications): Per  at last office visit: \"She will continue with her current dose of the Robaxin and the Norco. \"

## 2025-03-27 RX ORDER — HYDROCODONE BITARTRATE AND ACETAMINOPHEN 10; 325 MG/1; MG/1
1 TABLET ORAL EVERY 6 HOURS PRN
Qty: 120 TABLET | Refills: 0 | Status: SHIPPED | OUTPATIENT
Start: 2025-03-30 | End: 2025-04-29

## 2025-04-07 NOTE — PATIENT INSTRUCTIONS
36.6 Refill policies:    • Allow 2-3 business days for refills; controlled substances may take longer.   • Contact your pharmacy at least 5 days prior to running out of medication and have them send an electronic request or submit request through the [facility] recommended that you have a procedure or additional testing performed. [facility]) will contact your insurance carrier to obtain pre-certification or prior authorization.     Unfortunately, [employer] has seen an increase in denial of paym

## 2025-04-10 ENCOUNTER — OFFICE VISIT (OUTPATIENT)
Dept: PAIN CLINIC | Facility: CLINIC | Age: 60
End: 2025-04-10
Payer: MEDICARE

## 2025-04-10 VITALS
DIASTOLIC BLOOD PRESSURE: 62 MMHG | SYSTOLIC BLOOD PRESSURE: 122 MMHG | BODY MASS INDEX: 17 KG/M2 | WEIGHT: 98 LBS | HEART RATE: 58 BPM | OXYGEN SATURATION: 98 %

## 2025-04-10 DIAGNOSIS — Z97.8 PRESENCE OF INTRATHECAL PUMP: ICD-10-CM

## 2025-04-10 DIAGNOSIS — G89.29 OTHER CHRONIC PAIN: Primary | ICD-10-CM

## 2025-04-10 PROCEDURE — 62370 ANL SP INF PMP W/MDREPRG&FIL: CPT | Performed by: PHYSICIAN ASSISTANT

## 2025-04-10 RX ORDER — ALBUTEROL SULFATE 90 UG/1
2 INHALANT RESPIRATORY (INHALATION) EVERY 4 HOURS
COMMUNITY
Start: 2025-03-19 | End: 2025-04-10 | Stop reason: ALTCHOICE

## 2025-04-10 RX ORDER — NEOMYCIN SULFATE, POLYMYXIN B SULFATE AND HYDROCORTISONE 10; 3.5; 1 MG/ML; MG/ML; [USP'U]/ML
SUSPENSION/ DROPS AURICULAR (OTIC)
COMMUNITY
Start: 2025-04-09 | End: 2025-04-10 | Stop reason: ALTCHOICE

## 2025-04-10 RX ORDER — OSELTAMIVIR PHOSPHATE 75 MG/1
75 CAPSULE ORAL 2 TIMES DAILY
COMMUNITY
End: 2025-04-10 | Stop reason: ALTCHOICE

## 2025-04-10 RX ORDER — CEFDINIR 300 MG/1
300 CAPSULE ORAL EVERY 12 HOURS
COMMUNITY
End: 2025-04-10 | Stop reason: ALTCHOICE

## 2025-04-10 NOTE — PATIENT INSTRUCTIONS
Refill policies:    Allow 2-3 business days for refills; controlled substances may take longer.  Contact your pharmacy at least 5 days prior to running out of medication and have them send an electronic request or submit request through the “request refill” option in your [employer] account.  Refills are not addressed on weekends; covering physicians do not authorize routine medications on weekends.  No narcotics or controlled substances are refilled after noon on Fridays or by on call physicians.  By law, narcotics must be electronically prescribed.  A 30 day supply with no refills is the maximum allowed.  If your prescription is due for a refill, you may be due for a follow up appointment.  To best provide you care, patients receiving routine medications need to be seen at least once a year.  Patients receiving narcotic/controlled substance medications need to be seen at least once every 3 months.  In the event that your preferred pharmacy does not have the requested medication in stock (e.g. Backordered), it is your responsibility to find another pharmacy that has the requested medication available.  We will gladly send a new prescription to that pharmacy at your request.    Scheduling Tests:    If your physician has ordered radiology tests such as MRI or CT scans, please contact Central Scheduling at [phone] right away to schedule the test.  Once scheduled, the [facility] Centralized Referral Team will work with your insurance carrier to obtain pre-certification or prior authorization.  Depending on your insurance carrier, approval may take 3-10 days.  It is highly recommended patients assure they have received an authorization before having a test performed.  If test is done without insurance authorization, patient may be responsible for the entire amount billed.      Precertification and Prior Authorizations:  If your physician has recommended that you have a procedure or additional testing performed the [facility]  Centralized Referral Team will contact your insurance carrier to obtain pre-certification or prior authorization.    You are strongly encouraged to contact your insurance carrier to verify that your procedure/test has been approved and is a COVERED benefit.  Although the [facility] Centralized Referral Team does its due diligence, the insurance carrier gives the disclaimer that \"Although the procedure is authorized, this does not guarantee payment.\"    Ultimately the patient is responsible for payment.   Thank you for your understanding in this matter.  Paperwork Completion:  If you require FMLA or disability paperwork for your recovery, please make sure to either drop it off or have it faxed to our office at [phone]. Be sure the form has your name and date of birth on it.  The form will be faxed to our Forms Department and they will complete it for you.  There is a 25$ fee for all forms that need to be filled out.  Please be aware there is a 10-14 day turnaround time.  You will need to sign a release of information (LINSEY) form if your paperwork does not come with one.  You may call the Forms Department with any questions at [phone].  Their fax number is [phone].

## 2025-04-10 NOTE — PROGRESS NOTES
HPI:   Karine Ramesh presents for intra-thecal pump medication refill.   She presents with complaints of Low back pain.    The pain is described as moderate aching that is constant .  The patient’s activity level has remained the same since last visit.  The pain is worst unrelated to time of day.    Changes in condition/history since last visit: here for refill of IT pump.  She is stable, and requires no adjustments.      Of note, IT pump replacement 5/9/22 with Dr. Bustillo.      The following activities will increase the patient’s pain: any prolonged activity    The following activities decrease the patient’s pain: limiting activity level    Functional Assessment: Patient reports that they are able to complete all of their ADL's such as eating, bathing, using the toilet, dressing and getting up from a bed or a chair independently.    Current Medications:  Current Outpatient Medications   Medication Sig Dispense Refill    albuterol 108 (90 Base) MCG/ACT Inhalation Aero Soln Inhale 2 puffs into the lungs every 4 (four) hours.      neomycin-polymyxin-hydrocortisone 3.5-05355-3 Otic Suspension       cefdinir 300 MG Oral Cap Take 1 capsule (300 mg total) by mouth in the morning and 1 capsule (300 mg total) in the evening.      oseltamivir 75 MG Oral Cap Take 1 capsule (75 mg total) by mouth 2 (two) times daily.      morphINE (PF) 400 mg in sodium chloride 0.9% (PF) 40 mL IT infusion 40 mL by Intrathecal route continuous. (Forty mL) 1 each 0    HYDROcodone-acetaminophen  MG Oral Tab Take 1 tablet by mouth every 6 (six) hours as needed for Pain. 120 tablet 0    METHOCARBAMOL 750 MG Oral Tab TAKE 2 TABLETS EVERY 8     HOURS AS NEEDED 540 tablet 3    Triamterene-HCTZ 37.5-25 MG Oral Cap Take 1 capsule by mouth daily.  5    Multiple Vitamin (MULTI-VITAMINS) Oral Tab Take 1 tablet by mouth daily.        Side effects of medications: None    Patient requires assistance with: No assistance required    Reviewed Patient  History Dated: 1/21/25 no changes noted  Have you recently had any feelings of harming yourself or others? The patient's response was no.    Current Pump Information:  Medication and concentration of medication currently in pump: morphine 10 mg/mL  Continuous Rate: 3.594 mg per 24 hours  PA dosing: Enabled  PA dose 0.200 mg every 2 hours., Maximum delivery of 4 doses per 24 hours., If all PA doses utilized then patient will receive 4.374 mg per 24 hours. and This would be a 21.7 % increase over the simple continous dose for 24 hour period.  Reservoir Volume: 40 mL  Low Reservoir Volume: 2 mL  Telemetry Volume: 6.8 mL    New Pump Information:  Medication and concentration of medication being placed in pump:  Same as current concentration and medication  Actual residual volume: 12.0  Waste witnessed by: ANTOINETTE (initials)  New Continuous Rate: 3.594 mg per 24 hours  PA dosing: Enabled  PA dose 0.200 mg every 2 hours., Maximum delivery of 4 doses per 24 hours., If all PA doses utilized then patient will receive 4.374 mg per 24 hours. and This would be a 21.7 % increase over the simple continous dose for 24 hour period.  Reservoir Volume: 40 mL  Low Reservoir Alarm Volume: 2 mL  New Reservoir Alarm Date: 7/5/25    Technique:  Informed consent obtained.  Patient placed in the supine position and chloraprep was performed over the pump site.  Using sterile technique, the pump reservoir was accessed via a 22 gauge Day needle and 12.0 cc of clear fluid was withdrawn and wasted with witness.  The pump was refilled with 40cc of 10 mg/mL morphine.  Needle was removed intact.  The pump was reprogrammed to reflect this new volume.  New alarm date noted to patient.  Band-Aid applied to injection site.  Patient tolerated the procedure well, No evidence of bleeding noted      Physical Exam:   Vitals: There were no vitals taken for this visit.  VAS Pain Score:  3-4/10  General Appearance: Well developed, well nourished, normal build,  independent body habitus, no apparent physical disabilities, well groomed    Neurological Exam: WNL-Orientation to time, place and person, normal mood & effect, normal concentration & attention span    Inspection: non-antalgic, no acute distress    Radiology/Lab Test Reviewed: no new imaging    Medical Decision Making:   Diagnosis:    Encounter Diagnoses   Name Primary?    Other chronic pain Yes    Presence of intrathecal pump        Impression: Here today for IT pump refill, and did refill pump without changes to rate or concentration.  Reprogrammed to reflect new alarm date/volume.        Plan: refill IT pump without complication.  Reprogrammed to reflect new alarm date/volume.  F/u prior to above listed alarm date.        No orders of the defined types were placed in this encounter.      Meds & Refills for this Visit:  Requested Prescriptions      No prescriptions requested or ordered in this encounter       Imaging & Consults:  None    The patient indicates understanding of these issues and agrees to the plan.    GLYNN Guevara

## 2025-04-10 NOTE — PROGRESS NOTES
Timeout completed prior to procedure @ 2880.  Participants present for timeout:  Mick MAKI, VIOLA Lester, and patient.        Expected Waste Amt: 6.8 mL    Actual Waste Amt: 12 mL

## 2025-04-10 NOTE — PROGRESS NOTES
Patient presents in office today with reported pain in chronic pain    Current pain level reported = 3/10    Last reported dose of ITP

## 2025-04-23 DIAGNOSIS — M54.16 LUMBAR RADICULOPATHY: ICD-10-CM

## 2025-04-24 NOTE — TELEPHONE ENCOUNTER
Refill Request    Medication request: HYDROcodone-acetaminophen  MG Oral Tab  Take 1 tablet by mouth every 6 (six) hours as needed for Pain.     LOV:2025- televisit Homer Gibson MD   Due back to clinic per last office note:  \"She will follow-up with me in 6 months or sooner if needed. Her next follow-up appointment will be in the office. \"  NOV: Visit date not found      [place]: 3/31/25 #120 - 30 day supply    Urine drug screen (if applicable): 23  Pain contract:  on 24    LOV plan (if weaning or changing medications): Per  at last office visit: \"She will continue with her current dose of the Robaxin and the Norco. \"      Rx due 25. Will route to  on 25.

## 2025-04-29 RX ORDER — HYDROCODONE BITARTRATE AND ACETAMINOPHEN 10; 325 MG/1; MG/1
1 TABLET ORAL EVERY 6 HOURS PRN
Qty: 120 TABLET | Refills: 0 | Status: SHIPPED | OUTPATIENT
Start: 2025-04-30 | End: 2025-05-30

## 2025-05-07 NOTE — PATIENT INSTRUCTIONS
Refill policies:    • Allow 2-3 business days for refills; controlled substances may take longer.   • Contact your pharmacy at least 5 days prior to running out of medication and have them send an electronic request or submit request through the “request re This LCSW was on planned medical leave 3/18 - 5/6/25. LCSW reached out to patient to schedule follow up.   Patient prefers virtual and is scheduled.    Depending on your insurance carrier, approval may take 3-10 days. It is highly recommended patients contact their insurance carrier directly to determine coverage.   If test is done without insurance authorization, patient may be responsible for the entire

## 2025-05-13 ENCOUNTER — TELEPHONE (OUTPATIENT)
Dept: PAIN CLINIC | Facility: CLINIC | Age: 60
End: 2025-05-13

## 2025-05-13 DIAGNOSIS — M96.1 FAILED BACK SYNDROME OF LUMBAR SPINE: ICD-10-CM

## 2025-05-13 NOTE — TELEPHONE ENCOUNTER
ITP REFILL due by 7/5/2025.  Please place order for ITP Rx.  Last medication ordered:  morphINE (PF) 400 mg in sodium chloride 0.9% (PF) 40 mL IT infusion     OV scheduled 7/2/2025    E-Scribe Date: 6/25/2025

## 2025-05-22 DIAGNOSIS — M54.16 LUMBAR RADICULOPATHY: ICD-10-CM

## 2025-05-22 NOTE — TELEPHONE ENCOUNTER
Refill Request    Medication request:   Pending Prescriptions Disp Refills    HYDROcodone-acetaminophen  MG Oral Tab 120 tablet 0     Sig: Take 1 tablet by mouth every 6 (six) hours as needed for Pain.       LOV:2025 Homer Gibson MD   Due back to clinic per last office note:   \"She will follow-up with me in 6 months or sooner if needed. Her next follow-up appointment will be in the office. \"   NOV: 2025 Homer Gibson MD      [place]: 2025 #120- 30 day supply    Urine drug screen (if applicable): 23  Pain contract:  on 24    LOV plan (if weaning or changing medications):  Per  at last office visit: \"She will continue with her current dose of the Robaxin and the Norco. \"          RX due 2025

## 2025-05-23 RX ORDER — HYDROCODONE BITARTRATE AND ACETAMINOPHEN 10; 325 MG/1; MG/1
1 TABLET ORAL EVERY 6 HOURS PRN
Qty: 120 TABLET | Refills: 0 | Status: SHIPPED | OUTPATIENT
Start: 2025-05-23 | End: 2025-06-22

## 2025-06-25 ENCOUNTER — PATIENT MESSAGE (OUTPATIENT)
Dept: PHYSICAL MEDICINE AND REHAB | Facility: CLINIC | Age: 60
End: 2025-06-25

## 2025-06-25 DIAGNOSIS — M54.16 LUMBAR RADICULOPATHY: ICD-10-CM

## 2025-06-26 RX ORDER — HYDROCODONE BITARTRATE AND ACETAMINOPHEN 10; 325 MG/1; MG/1
1 TABLET ORAL EVERY 6 HOURS PRN
Qty: 120 TABLET | Refills: 0 | Status: SHIPPED | OUTPATIENT
Start: 2025-06-29 | End: 2025-07-29

## 2025-06-26 NOTE — TELEPHONE ENCOUNTER
"Subjective     Guilherme Williamson is a 44 y.o. male who presents with Sinus Problem (2 weeks  Stuffy nose ears are hurting and back of neck) and UTI (Started 1 week ago  frequency  only)            #1  2 weeks sinus pressure and drainage. Mild ST. No cough. PMH sinusitis/no sinus surgery. Bilateral ear pressure. Short term relief with sudafed.       #2  1 weeks urinary frequency. No pain with urination. No polydipsia. PMH DM/no current medications/previous metformin. + PMH UTI. No Hematuria. No fever. No flank pain.       Review of Systems   Constitutional:  Negative for malaise/fatigue and weight loss.   Eyes:  Negative for discharge and redness.   Gastrointestinal:  Negative for nausea and vomiting.   Musculoskeletal:  Negative for joint pain and myalgias.   Skin:  Negative for itching and rash.            Objective     BP (!) 150/90 (BP Location: Right arm, Patient Position: Sitting, BP Cuff Size: Adult)   Pulse 94   Temp 36.9 °C (98.4 °F) (Temporal)   Resp 14   Ht 1.727 m (5' 8\")   Wt 116 kg (255 lb)   SpO2 96%   BMI 38.77 kg/m²      Physical Exam  Constitutional:       General: He is not in acute distress.     Appearance: He is well-developed.   HENT:      Head: Normocephalic and atraumatic.      Right Ear: Tympanic membrane normal.      Left Ear: Tympanic membrane normal.      Nose: Congestion present.      Mouth/Throat:      Comments: PND  Eyes:      Conjunctiva/sclera: Conjunctivae normal.   Cardiovascular:      Rate and Rhythm: Normal rate and regular rhythm.      Heart sounds: Normal heart sounds. No murmur heard.  Pulmonary:      Effort: Pulmonary effort is normal.      Breath sounds: Normal breath sounds. No wheezing.   Skin:     General: Skin is warm and dry.      Findings: No rash.   Neurological:      Mental Status: He is alert.                           Assessment & Plan       UA reviewed: + Glucose, negative nitrite, negative leukocyte esterase    1. Rhinosinusitis  amoxicillin-clavulanate " Refill Request    Medication request: HYDROcodone-acetaminophen  MG Oral Tab  Take 1 tablet by mouth every 6 (six) hours as needed for Pain.     LOV:2025 Homer Gibson MD   Due back to clinic per last office note:  \"She will follow-up with me in 6 months or sooner if needed. Her next follow-up appointment will be in the office. \"  NOV: 2025 Homer Gibson MD      [place]: 25 #120 - 30 day supply    Urine drug screen (if applicable): 23  Pain contract:  on 24    LOV plan (if weaning or changing medications): Per  at last office visit: \"She will continue with her current dose of the Robaxin and the Norco. \"            (AUGMENTIN) 875-125 MG Tab    amoxicillin-clavulanate (AUGMENTIN) 875-125 MG Tab      2. Urinary frequency  POCT Urinalysis      3. Type 2 diabetes mellitus without complication, without long-term current use of insulin (HCC)  metFORMIN (GLUCOPHAGE) 500 MG Tab    metFORMIN (GLUCOPHAGE) 500 MG Tab        Differential diagnosis, natural history, supportive care, and indications for immediate follow-up discussed at length.     F/u primary care

## 2025-06-27 NOTE — TELEPHONE ENCOUNTER
ITP PUMP Medication received.  Verified by Laura ROD and Tayler ROD.  Dose correct and medication stored in lock box.    Rx #: [id number]  LOT #: [id number]  Expiration date:   7/10/2025

## 2025-07-02 ENCOUNTER — OFFICE VISIT (OUTPATIENT)
Dept: PAIN CLINIC | Facility: CLINIC | Age: 60
End: 2025-07-02
Payer: MEDICARE

## 2025-07-02 VITALS
OXYGEN SATURATION: 92 % | BODY MASS INDEX: 17 KG/M2 | WEIGHT: 98 LBS | SYSTOLIC BLOOD PRESSURE: 108 MMHG | HEART RATE: 98 BPM | DIASTOLIC BLOOD PRESSURE: 62 MMHG

## 2025-07-02 DIAGNOSIS — G89.29 OTHER CHRONIC PAIN: ICD-10-CM

## 2025-07-02 DIAGNOSIS — Z97.8 PRESENCE OF INTRATHECAL PUMP: Primary | ICD-10-CM

## 2025-07-02 PROCEDURE — 62370 ANL SP INF PMP W/MDREPRG&FIL: CPT | Performed by: PHYSICIAN ASSISTANT

## 2025-07-02 RX ORDER — ALBUTEROL SULFATE 90 UG/1
2 INHALANT RESPIRATORY (INHALATION) EVERY 4 HOURS
COMMUNITY
Start: 2025-06-13

## 2025-07-02 NOTE — PROGRESS NOTES
HPI:   Karine Ramesh presents for intra-thecal pump medication refill.   She presents with complaints of Low back pain.    The pain is described as moderate aching that is constant .  The patient’s activity level has remained the same since last visit.  The pain is worst unrelated to time of day.    Changes in condition/history since last visit: here for refill of IT pump.  She is stable, and requires no adjustments.      Of note, IT pump replacement 5/9/22 with Dr. Bustillo.      The following activities will increase the patient’s pain: any prolonged activity    The following activities decrease the patient’s pain: limiting activity level    Functional Assessment: Patient reports that they are able to complete all of their ADL's such as eating, bathing, using the toilet, dressing and getting up from a bed or a chair independently.    Current Medications:  Current Outpatient Medications   Medication Sig Dispense Refill    HYDROcodone-acetaminophen  MG Oral Tab Take 1 tablet by mouth every 6 (six) hours as needed for Pain. 120 tablet 0    morphINE (PF) 400 mg in sodium chloride 0.9% (PF) 40 mL IT infusion 40 mL by Intrathecal route continuous. (Forty mL) 1 each 0    Naloxone HCl 4 MG/0.1ML Nasal Liquid 4 mg by Nasal route as needed. If patient remains unresponsive, repeat dose in other nostril 2-5 minutes after first dose. 1 kit 0    METHOCARBAMOL 750 MG Oral Tab TAKE 2 TABLETS EVERY 8     HOURS AS NEEDED 540 tablet 3    Triamterene-HCTZ 37.5-25 MG Oral Cap Take 1 capsule by mouth daily.  5    Multiple Vitamin (MULTI-VITAMINS) Oral Tab Take 1 tablet by mouth daily.        Side effects of medications: None    Patient requires assistance with: No assistance required    Reviewed Patient History Dated: 1/21/25 no changes noted  Have you recently had any feelings of harming yourself or others? The patient's response was no.    Current Pump Information:  Medication and concentration of medication currently in  pump: morphine 10 mg/mL  Continuous Rate: 3.594 mg per 24 hours  PA dosing: Enabled  PA dose 0.200 mg every 2 hours., Maximum delivery of 4 doses per 24 hours., If all PA doses utilized then patient will receive 4.374 mg per 24 hours. and This would be a 21.7 % increase over the simple continous dose for 24 hour period.  Reservoir Volume: 40 mL  Low Reservoir Volume: 2 mL  Telemetry Volume: 4.9 mL    New Pump Information:  Medication and concentration of medication being placed in pump:  Same as current concentration and medication  Actual residual volume: 9.3  Waste witnessed by: ANTOINETTE (initials)  New Continuous Rate: 3.594 mg per 24 hours  PA dosing: Enabled  PA dose 0.200 mg every 2 hours., Maximum delivery of 4 doses per 24 hours., If all PA doses utilized then patient will receive 4.374 mg per 24 hours. and This would be a 21.7 % increase over the simple continous dose for 24 hour period.  Reservoir Volume: 40 mL  Low Reservoir Alarm Volume: 2 mL  New Reservoir Alarm Date: 9/26/25    Technique:  Informed consent obtained.  Patient placed in the supine position and chloraprep was performed over the pump site.  Using sterile technique, the pump reservoir was accessed via a 22 gauge Day needle and 9.3 cc of clear fluid was withdrawn and wasted with witness.  The pump was refilled with 40cc of 10 mg/mL morphine.  Needle was removed intact.  The pump was reprogrammed to reflect this new volume.  New alarm date noted to patient.  Band-Aid applied to injection site.  Patient tolerated the procedure well, No evidence of bleeding noted      Physical Exam:   Vitals: There were no vitals taken for this visit.  VAS Pain Score:  3-4/10  General Appearance: Well developed, well nourished, normal build, independent body habitus, no apparent physical disabilities, well groomed    Neurological Exam: WNL-Orientation to time, place and person, normal mood & effect, normal concentration & attention span    Inspection: non-antalgic,  no acute distress    Radiology/Lab Test Reviewed: no new imaging    Medical Decision Making:   Diagnosis:    Encounter Diagnoses   Name Primary?    Presence of intrathecal pump Yes    Other chronic pain      Impression: Here today for IT pump refill, and did refill pump without changes to rate or concentration.  Reprogrammed to reflect new alarm date/volume.        Plan: refill IT pump without complication.  Reprogrammed to reflect new alarm date/volume.  F/u prior to above listed alarm date.        No orders of the defined types were placed in this encounter.      Meds & Refills for this Visit:  Requested Prescriptions      No prescriptions requested or ordered in this encounter       Imaging & Consults:  None    The patient indicates understanding of these issues and agrees to the plan.    GLYNN Guevara

## 2025-07-02 NOTE — PATIENT INSTRUCTIONS
Refill policies:    Allow 2-3 business days for refills; controlled substances may take longer.  Contact your pharmacy at least 5 days prior to running out of medication and have them send an electronic request or submit request through the “request refill” option in your [employer] account.  Refills are not addressed on weekends; covering physicians do not authorize routine medications on weekends.  No narcotics or controlled substances are refilled after noon on Fridays or by on call physicians.  By law, narcotics must be electronically prescribed.  A 30 day supply with no refills is the maximum allowed.  If your prescription is due for a refill, you may be due for a follow up appointment.  To best provide you care, patients receiving routine medications need to be seen at least once a year.  Patients receiving narcotic/controlled substance medications need to be seen at least once every 3 months.  In the event that your preferred pharmacy does not have the requested medication in stock (e.g. Backordered), it is your responsibility to find another pharmacy that has the requested medication available.  We will gladly send a new prescription to that pharmacy at your request.    Scheduling Tests:    If your physician has ordered radiology tests such as MRI or CT scans, please contact Central Scheduling at [phone] right away to schedule the test.  Once scheduled, the [facility] Centralized Referral Team will work with your insurance carrier to obtain pre-certification or prior authorization.  Depending on your insurance carrier, approval may take 3-10 days.  It is highly recommended patients assure they have received an authorization before having a test performed.  If test is done without insurance authorization, patient may be responsible for the entire amount billed.      Precertification and Prior Authorizations:  If your physician has recommended that you have a procedure or additional testing performed the [facility]  Centralized Referral Team will contact your insurance carrier to obtain pre-certification or prior authorization.    You are strongly encouraged to contact your insurance carrier to verify that your procedure/test has been approved and is a COVERED benefit.  Although the [facility] Centralized Referral Team does its due diligence, the insurance carrier gives the disclaimer that \"Although the procedure is authorized, this does not guarantee payment.\"    Ultimately the patient is responsible for payment.   Thank you for your understanding in this matter.  Paperwork Completion:  If you require FMLA or disability paperwork for your recovery, please make sure to either drop it off or have it faxed to our office at [phone]. Be sure the form has your name and date of birth on it.  The form will be faxed to our Forms Department and they will complete it for you.  There is a 25$ fee for all forms that need to be filled out.  Please be aware there is a 10-14 day turnaround time.  You will need to sign a release of information (LINSEY) form if your paperwork does not come with one.  You may call the Forms Department with any questions at [phone].  Their fax number is [phone].

## 2025-07-02 NOTE — PROGRESS NOTES
Timeout completed prior to procedure @ 0067.  Participants present for timeout:  Mick MAKI, VIOLA Lester, and patient.    Expected Waste Amt: 4.9 mL    Actual Waste Amt: 9.3 mL

## 2025-07-16 ENCOUNTER — PATIENT MESSAGE (OUTPATIENT)
Dept: PHYSICAL MEDICINE AND REHAB | Facility: CLINIC | Age: 60
End: 2025-07-16

## 2025-07-18 ENCOUNTER — PATIENT MESSAGE (OUTPATIENT)
Dept: PHYSICAL MEDICINE AND REHAB | Facility: CLINIC | Age: 60
End: 2025-07-18

## 2025-07-22 NOTE — TELEPHONE ENCOUNTER
Electronic PA submitted for methocarbamol.   PA pending.    Waiting for Payer Response   7/22/2025  1:45 PM  Deadline to reply: July 26, 2025  1:34 PM User: Juanita Win RN   Note to payer: Patient has tried and failed alternative muscle relaxer's as she has been unable to tolerate the side effects. (Cyclobenzaprine, Tizanidine, Baclofen, chlorzoxazone  all caused nausea/vomiting.) See attached records for more information. Patient has been stable on the methocarbamol since 2014.   Payer: [facility] Case ID: [id number]

## 2025-07-27 DIAGNOSIS — M54.16 LUMBAR RADICULOPATHY: ICD-10-CM

## 2025-07-29 RX ORDER — HYDROCODONE BITARTRATE AND ACETAMINOPHEN 10; 325 MG/1; MG/1
1 TABLET ORAL EVERY 6 HOURS PRN
Qty: 120 TABLET | Refills: 0 | Status: SHIPPED | OUTPATIENT
Start: 2025-07-29 | End: 2025-08-28

## 2025-08-07 ENCOUNTER — OFFICE VISIT (OUTPATIENT)
Dept: PHYSICAL MEDICINE AND REHAB | Facility: CLINIC | Age: 60
End: 2025-08-07

## 2025-08-07 ENCOUNTER — LAB ENCOUNTER (OUTPATIENT)
Dept: LAB | Facility: HOSPITAL | Age: 60
End: 2025-08-07
Attending: PHYSICAL MEDICINE & REHABILITATION

## 2025-08-07 VITALS — WEIGHT: 106 LBS | BODY MASS INDEX: 18 KG/M2

## 2025-08-07 DIAGNOSIS — Q76.1 CERVICAL FUSION SYNDROME: ICD-10-CM

## 2025-08-07 DIAGNOSIS — Z51.81 THERAPEUTIC DRUG MONITORING: Primary | ICD-10-CM

## 2025-08-07 DIAGNOSIS — M51.26 LUMBAR HERNIATED DISC: ICD-10-CM

## 2025-08-07 DIAGNOSIS — M54.16 LUMBAR RADICULOPATHY: ICD-10-CM

## 2025-08-07 DIAGNOSIS — I10 ESSENTIAL HYPERTENSION: ICD-10-CM

## 2025-08-07 DIAGNOSIS — M96.1 LUMBAR POST-LAMINECTOMY SYNDROME: ICD-10-CM

## 2025-08-07 DIAGNOSIS — M96.1 FAILED BACK SYNDROME OF LUMBAR SPINE: ICD-10-CM

## 2025-08-07 DIAGNOSIS — M48.061 LUMBAR FORAMINAL STENOSIS: ICD-10-CM

## 2025-08-07 DIAGNOSIS — M51.9 LUMBAR DISC DISEASE: ICD-10-CM

## 2025-08-07 DIAGNOSIS — Z51.81 THERAPEUTIC DRUG MONITORING: ICD-10-CM

## 2025-08-07 LAB
AMPHET UR QL SCN: NEGATIVE
BARBITURATES UR QL SCN: NEGATIVE
BENZODIAZ UR QL SCN: NEGATIVE
CANNABINOIDS UR QL SCN: NEGATIVE
COCAINE UR QL: NEGATIVE
CREAT UR-SCNC: 20.4 MG/DL
FENTANYL UR QL SCN: NEGATIVE
MDMA UR QL SCN: NEGATIVE
METHADONE UR QL SCN: NEGATIVE
OXYCODONE UR QL SCN: NEGATIVE
PCP UR QL SCN: NEGATIVE

## 2025-08-07 PROCEDURE — 99214 OFFICE O/P EST MOD 30 MIN: CPT | Performed by: PHYSICAL MEDICINE & REHABILITATION

## 2025-08-07 PROCEDURE — 80361 OPIATES 1 OR MORE: CPT

## 2025-08-07 PROCEDURE — 80307 DRUG TEST PRSMV CHEM ANLYZR: CPT

## 2025-08-12 LAB
CODEINE UR: NEGATIVE
HYDROCODONE CONF UR: 290 NG/ML
HYDROCODONE UR: POSITIVE
HYDROMORPH CONF UR: 212 NG/ML
HYDROMORPH UR: POSITIVE
MORPHINE CONF UR: 584 NG/ML
MORPHINE UR: POSITIVE
OPIATES CLASS UR: POSITIVE NG/ML

## 2025-08-26 DIAGNOSIS — M54.16 LUMBAR RADICULOPATHY: ICD-10-CM

## 2025-08-26 RX ORDER — HYDROCODONE BITARTRATE AND ACETAMINOPHEN 10; 325 MG/1; MG/1
1 TABLET ORAL EVERY 6 HOURS PRN
Qty: 120 TABLET | Refills: 0 | Status: SHIPPED | OUTPATIENT
Start: 2025-08-26 | End: 2025-09-25

## (undated) DIAGNOSIS — M96.1 FAILED BACK SYNDROME OF LUMBAR SPINE: ICD-10-CM

## (undated) DIAGNOSIS — Z97.8 PRESENCE OF INTRATHECAL PUMP: Primary | ICD-10-CM

## (undated) DEVICE — SUTURE SILK 2-0 SH

## (undated) DEVICE — Device

## (undated) DEVICE — DRAPE C-ARM UNIVERSAL

## (undated) DEVICE — GLOVE SURG SENSICARE SZ 7

## (undated) DEVICE — LAMINECTOMY CDS: Brand: MEDLINE INDUSTRIES, INC.

## (undated) DEVICE — REMOVER DURAPREP 3M

## (undated) DEVICE — SUTURE SILK 0 CT-1

## (undated) DEVICE — DRESSING WOUND SILVERLON

## (undated) DEVICE — SYRINGE 30ML LL TIP

## (undated) DEVICE — TOWEL: OR BLU 80/CS: Brand: MEDICAL ACTION INDUSTRIES

## (undated) DEVICE — GLOVE BIOGEL M SURG SZ 8

## (undated) DEVICE — LIGHT HANDLE

## (undated) DEVICE — DRILL SRG OIL CRTDG MAESTRO

## (undated) DEVICE — CLOSURE EXOFIN 1.0ML

## (undated) DEVICE — GLOVE SURG SENSICARE SZ 7-1/2

## (undated) DEVICE — SOLUTION  .9 1000ML BTL

## (undated) DEVICE — DRAPE,T,LAPARO,TRANS,STERILE: Brand: MEDLINE

## (undated) DEVICE — SUTURE VICRYL 3-0 RB-1

## (undated) DEVICE — INTENDED USED TO PROTECT, TAG AND HELP LOCATED SUTURES DURING SURGERY: Brand: STERION®SUTURE AID BOOTIES

## (undated) DEVICE — DERMABOND LIQUID ADHESIVE

## (undated) DEVICE — ISOPROPYL ALCOHOL 70% 4OZ BTL

## (undated) DEVICE — GLOVE BIOGEL ORTHO SZ 8

## (undated) DEVICE — BANDAID COVERLET 1X3

## (undated) DEVICE — TRANSPOSAL ULTRAFLEX DUO/QUAD ULTRA CART MANIFOLD

## (undated) DEVICE — NEEDLE 18G 1-1/2 BLUNT FILL

## (undated) DEVICE — SUTURE VICRYL 2-0 CT-2

## (undated) DEVICE — DRAPE TABLE COVER 44X90 TC-10

## (undated) DEVICE — SYRINGE 20CC LL TIP

## (undated) DEVICE — PEN SKIN MARKING REG TIP VIOLT

## (undated) DEVICE — CATH IV 14G X 5-1/4 ANGIO

## (undated) DEVICE — STANDARD HYPODERMIC NEEDLE,POLYPROPYLENE HUB: Brand: MONOJECT

## (undated) DEVICE — KIT REFILL MEDTRONIC 8551

## (undated) DEVICE — Device: Brand: INTELLICART™

## (undated) DEVICE — SOL  .9 1000ML BTL

## (undated) DEVICE — SUTURE SILK 0 CT-2

## (undated) DEVICE — SUTURE VICRYL 0 CT-2

## (undated) DEVICE — GOWN AERO CHROME XXL

## (undated) DEVICE — STERILE SYNTHETIC POLYISOPRENE POWDER-FREE SURGICAL GLOVES WITH HYDROGEL COATING: Brand: PROTEXIS

## (undated) DEVICE — SYRINGE 5ML LL TIP

## (undated) DEVICE — KENDALL SCD EXPRESS SLEEVES, THIGH LENGTH, MEDIUM: Brand: KENDALL SCD

## (undated) DEVICE — ALCOHOL 70% 4 OZ

## (undated) DEVICE — PENCIL TELESCOPE MEGADYNE SE

## (undated) DEVICE — SLEEVE KENDALL SCD EXPRESS MED

## (undated) DEVICE — PROVE COVER: Brand: UNBRANDED

## (undated) NOTE — LETTER
Patient Name: Kacey Simon        : 10/21/1965       Medical Record #: [id number]    CONSENT FOR PROCEDURES/SEDATION    Date: 10/14/2021       Time: 8:16 AM        1.  I authorize the performance upon Kacey Simon the following:    ______________

## (undated) NOTE — LETTER
7/17/2017      Suzanne Bansal MD  Physical Medicine and Rehabilitation  [address] [facility], [address] [place] [address]  Dept: [phone]  Dept Fax: [phone]        RE: Consultation for Celia Garza        Dear Mayra Snyder MD,    Thank

## (undated) NOTE — LETTER
1/23/2018      Carolynn Foreman MD  Physical Medicine and Rehabilitation  [address] [facility], [address] [place] [address]  Dept: [phone]  Dept Fax: [phone]        RE: Consultation for Sanjay Chandler        Dear Mor May MD,    Thank

## (undated) NOTE — LETTER
AGREEMENT FOR OPIOID TREATMENT FOR PAIN      Please read each statement, initial at the bottom of each page, and sign the last page to indicate your agreement with this form.  If you have any questions about any information in this form or the opioid tr symptoms can include yawning, sweating, watery eyes, runny nose, anxiety, tremors, aching muscles, hot and cold flashes, “goose bumps”, abdominal cramps, and diarrhea. These symptoms can occur 24-48 hours after the last dose and can last up to 3 weeks. the treatment plan, referral to the Medication Assisted Therapy Program, an may result in termination of the physician/patient relationship. 8. I will not use any illicit substances, such as cocaine, marijuana, etc. while taking these medications.   I un refills of any medications during the evening or on weekends.     f. I must bring back all opioid medications and adjunctive medications prescribed by my physician in the original containers/bottles at every visit.    g. Prescriptions will not be written in induces changes that result in a lessening of one or more of the drug’s effects over time. The dose of the opioid may have to be adjusted up or down to a dose that produces maximum function and a realistic decrease of the patient’s pain.     12. If it appe 16. I agree to work closely with my physician/health care provider to assure the agreed plan of care is followed. 18. I acknowledge that I have received a copy of this agreement for my records.           I __________________________________________ have

## (undated) NOTE — LETTER
4/26/2018      Genia Cerna MD  Physical Medicine and Rehabilitation  [address] [facility], [address] [place] [address]  Dept: [phone]  Dept Fax: [phone]        RE: Consultation for Tim Ugalde        Dear Nieves Giordano MD,    Thank

## (undated) NOTE — LETTER
Patient Name: Jomar Pate        : 10/21/1965       Medical Record #: [id number]    CONSENT FOR PROCEDURES/SEDATION    Date: 2019       Time: 1:56 PM        1.  I authorize the performance upon Jomar Pate the following:    Intrathecal pum

## (undated) NOTE — LETTER
1/1/2019      Rosenda Pillai MD  Physical Medicine and Rehabilitation  [address] [address]  Dept: [phone]  Dept Fax: [phone]        RE: Consultation for Wayne Allen        Dear Oneida Berumen MD,    Thank y

## (undated) NOTE — LETTER
Patient Name: Karine Ramesh        : 10/21/1965       Medical Record #: [id number]    CONSENT FOR PROCEDURES/SEDATION    Date: 2024       Time: 8:28 AM        1. I authorize the performance upon Karine Ramesh the following:    _____________INTRATHECAL PUMP REFILL_______________________________    2. I authorize GLYNN Benoit (and whomever is designated as the doctor’s assistant), to perform the above mentioned procedures.    3. If any unforeseen conditions arise during this procedure calling for additional procedures, operations, or medications (including anesthesia and blood transfusion), I  further request and authorize the doctor to do whatever he/she deems advisable in my interest.    4. I consent to the taking and reproduction of any photographs in the course of this procedure for professional purposes.    5. I consent to the administration of such sedation as may be considered necessary or advisable by the physician responsible for this service, with the exception of  _____________________________.    6. I have been informed by my doctor of the nature and purpose of this procedure/sedation, possible alternative methods of treatment, risk involved and possible complications.      Signature of Patient:  ___________________________    Signature of person authorized to consent for patient: Relationship to patient:  ___________________________    ___________________    Witness: ____________________     Date: ______________    Provider: ____________________     Date: ______________

## (undated) NOTE — MR AVS SNAPSHOT
[address], [address] [place] [address] [phone]               Thank you for choosing us for your health care visit with Rachel Cutler MD.  We are glad to serve you and happy to provide you with th the maximum allowed. ? If your prescription is due for a refill, you may be due for a follow up appointment. ? To best provide you care, patients receiving routine medications need to be seen at least once a year.      protocol for controlled subst Allergies as of Jun 22, 2017     Shellfish-Derived Products Tongue Swelling    Baclofen Nausea and vomiting    Cyclobenzaprine Nausea and vomiting    Upset stomach    Latex Other (See Comments)    blisters    Tizanidine Nausea and vomiting                T Visit [facility] online at  [email or web address]

## (undated) NOTE — LETTER
Patient Name: Karine Ramesh        : 10/21/1965       Medical Record #: [id number]    CONSENT FOR PROCEDURES/SEDATION    Date: 2024       Time: 10:24 AM        1. I authorize the performance upon Karine Ramesh the following:    ______________INTRATHECAL PUMP REFILL______________    2. I authorize Juanita Rodas (and whomever is designated as the doctor’s assistant), to perform the above mentioned procedures.    3. If any unforeseen conditions arise during this procedure calling for additional procedures, operations, or medications (including anesthesia and blood transfusion), I  further request and authorize the doctor to do whatever he/she deems advisable in my interest.    4. I consent to the taking and reproduction of any photographs in the course of this procedure for professional purposes.    5. I consent to the administration of such sedation as may be considered necessary or advisable by the physician responsible for this service, with the exception of  _____________________________.    6. I have been informed by my doctor of the nature and purpose of this procedure/sedation, possible alternative methods of treatment, risk involved and possible complications.      Signature of Patient:  ___________________________    Signature of person authorized to consent for patient: Relationship to patient:  ___________________________    ___________________    Witness: ____________________     Date: ______________    Provider: ____________________     Date: ______________

## (undated) NOTE — Clinical Note
4/11/2017      Jon AGUILERA  [place] MD Emily  Physical Medicine and Rehabilitation  [address] [address]  Dept: [phone]  Dept Fax: [phone]        RE: Consultation for Ananda Sparks        Dear Elena Cornejo MD,    Thank

## (undated) NOTE — LETTER
Patient Name: Karine Ramesh        : 10/21/1965       Medical Record #: [id number]    CONSENT FOR PROCEDURES/SEDATION    Date: 10/29/2024       Time: 7:55 AM        1. I authorize the performance upon Karine Ramesh the following:    ______                             _INTRATHECAL PUMP REFILL______                    ___________    2. I authorize Mick MAKI (and whomever is designated as the doctor’s assistant), to perform the above mentioned procedures.    3. If any unforeseen conditions arise during this procedure calling for additional procedures, operations, or medications (including anesthesia and blood transfusion), I  further request and authorize the doctor to do whatever he/she deems advisable in my interest.    4. I consent to the taking and reproduction of any photographs in the course of this procedure for professional purposes.    5. I consent to the administration of such sedation as may be considered necessary or advisable by the physician responsible for this service, with the exception of  _____________________________.    6. I have been informed by my doctor of the nature and purpose of this procedure/sedation, possible alternative methods of treatment, risk involved and possible complications.      Signature of Patient:  ___________________________    Signature of person authorized to consent for patient: Relationship to patient:  ___________________________    ___________________    Witness: ____________________     Date: ______________    Provider: ____________________     Date: ______________

## (undated) NOTE — Clinical Note
[facility]  A. Notifier:    ISAIAS Will  [id number]    Advance Beneficiary Notice of Noncoverage (ABN)    Note:  If Medicare doesn't pay for D. Left greater trochanteric bursal steroid injection      below, you may have to pay.   Medi I made to you, less copays or deductibles. Option 2:  I want the D. Left greater trochanteric bursal steroid injection     listed above, but do not bill Medicare. You may ask to be paid now as I am responsible for payment.   I cannot appeal if Medica

## (undated) NOTE — LETTER
Patient Name: Linda Cobb        : 10/21/1965       Medical Record #: [id number]    CONSENT FOR PROCEDURES/SEDATION    Date: 2018       Time: 11:46 AM        1.  I authorize the performance upon Linda Cobb the following:    Intrathecal i

## (undated) NOTE — LETTER
17          Kyrie Ferrari  :  10/21/1965      To Whom It May Concern: This patient was seen in our office on 17 .   Pt will need to be dismissed from jury duty for reasons of restricted mobility and inability to sit for long peroids of t

## (undated) NOTE — LETTER
8/9/2018      Gianna Flynn MD  Physical Medicine and Rehabilitation  [address] [address]  Dept: [phone]  Dept Fax: [phone]        RE: Consultation for Sasha Wheat        Dear Scotty De Los Santos MD,    Thank y

## (undated) NOTE — LETTER
2/25/2019      Durwin Collet A. Tauna Aguas, MD  Physical Medicine and Rehabilitation  [address] [address]  Dept: [phone]  Dept Fax: [phone]        RE: Consultation for Quoc Laureano        Dear River Escalante MD,    Thank

## (undated) NOTE — LETTER
AUTHORIZATION FOR SURGICAL OPERATION OR OTHER PROCEDURE    1.  I hereby authorize Dr. Marce Aldrich and the [facility] staff assigned to my case to perform the following operation and/or procedure at the [facility]:    Left greater trochanteric bursal injecti Time:  ________ A. M.  P.M.        Patient Name:  ______________________________________________________  (please print)       Patient signature:  ___________________________________________________             Relationship to Patient:

## (undated) NOTE — LETTER
Patient Name: Ananda Sparks        : 10/21/1965       Medical Record #: [id number]    CONSENT FOR PROCEDURES/SEDATION    Date: 10/13/2017       Time: 11:30 AM        1.  I authorize the performance upon Ananda Bridger the following:    Intrathecal i

## (undated) NOTE — LETTER
Patient Name: Leigha Hoff        : 10/21/1965       Medical Record #: [id number]    CONSENT FOR PROCEDURES/SEDATION    Date: 1/3/2022       Time: 8:10 AM        1.  I authorize the performance upon Leigha Hoff the following:    ________________

## (undated) NOTE — LETTER
Patient Name: Sergei Miller        : 10/21/1965       Medical Record #: [id number]    CONSENT FOR PROCEDURES/SEDATION    Date: 2020       Time: 8:36 AM        1.  I authorize the performance upon Sergei Miller the following:    Intrathecal infu

## (undated) NOTE — LETTER
Patient Name: Sosa Momin        : 10/21/1965       Medical Record #: [id number]    CONSENT FOR PROCEDURES/SEDATION    Date: 2020       Time: 1:20 PM        1.  I authorize the performance upon Sosa Momin the following:    ______________

## (undated) NOTE — LETTER
2022    RE: Roseanna Douglas     : 10/21/1965    Dear Dr. Castillo Second,    This letter is to inform you that your patient is being scheduled for surgery with Dr. Greg Watson on 22 at [facility]. We have asked the patient to contact your office to schedule a pre-operative exam/visit. Diagnosis: Presence of intrathecal pump Z97.8  Procedure: Intrathecal Pump Replacement    A Pre-operative History & Physical is needed for medical clearance. Please address patient's active problems (i.e high blood pressure, breathing issues etc.)  Pre-op labs are scheduled through the [facility].. If any labs/testing are being done through the PCP office, then results should be faxed to the pre-admission testing department at [facility] [phone]. Our pre-operative lab orders are located in our surgery order, if the patient would like these done through your office, you will need to place separate orders. Please fax clearance letter/office visit note to the Pre-Admission department or our office at fax #: [phone]. Your office note must clearly indicate if the patient is medically cleared for surgery or not. The following orders will be placed by pre-admission testing:  CBC  Comprehensive Metabolic Panel  Type and Screen (if applicable)  PT/PTT/INR  MRSA/MSSA Nasal Swab  Covid-19 testing  EKG/CXR  (*And any other pertinent testing based on patient's current clinical condition.)      If you have any questions, you may contact our office at [phone], option # 3.     Thank you,    NATTY Staff

## (undated) NOTE — LETTER
Patient Name: Tim Ugalde        : 10/21/1965       Medical Record #: [id number]    CONSENT FOR PROCEDURES/SEDATION    Date: 2020       Time: 1:05 PM        1.  I authorize the performance upon Tim Ugalde the following:    Intrathecal pump

## (undated) NOTE — LETTER
11/13/2023      Savi Chaves MD  Physical Medicine and Rehabilitation  [address] [facility], [address] [place] [address]  Dept: [phone]  Dept Fax: [phone]        RE: Consultation for Fransisco Myers        Dear Rachel Dillon MD,    Thank you very much for the opportunity to see your patient. Attached please find a summary from your patient's recent visit. I appreciate the chance to take care of your patient with you. Please feel free to call me with any questions or concerns. Sincerely,        Ann Marie Mike.  MD Arthur  Electronically Signed on 11/13/2023

## (undated) NOTE — LETTER
Patient Name: Sasha Wheat        : 10/21/1965       Medical Record #: [id number]    CONSENT FOR PROCEDURES/SEDATION    Date: 2018       Time: 9:45 AM        1.  I authorize the performance upon Sasha Wheat the following:    Intrathecal infu

## (undated) NOTE — IP AVS SNAPSHOT
[facility] [place] [address] [place], Mukesh Galvan ~ [phone]                Discharge Summary   3/9/2017    Benny House           Admission Information        Provider Department    3/9/2017 [facility] The name of the procedure performed on you today is:      Please see page 1    It  has been a pleasure to have you as our patient. To help you at home, you must follow these general discharge instructions.  We will review these with you before you are disch Why:  As needed    Contact information:    [address] [address] 1101        Future Appointments        Provider Department    3/31/2017 1:00 PM Sondra Gibson, [facility] Call [phone] for help. [facility] is NOT to be used for urgent needs. For medical emergencies, dial 911.

## (undated) NOTE — LETTER
Patient Name: Nicole Billy        : 10/21/1965       Medical Record #: [id number]    CONSENT FOR PROCEDURES/SEDATION    Date: 3/18/2020       Time: 3:13 PM        1.  I authorize the performance upon Nicole Billy the following:    Intrathecal inf

## (undated) NOTE — LETTER
SONIA. Notifier: Yariel/[employer]   DEBORAH. Patient Name: Axel Roque. Identification Number: [id number]      Advance Beneficiary Notice of Noncoverage (ABN)  NOTE:  If Medicare doesn’t pay for D. Left greater trochanteric bursal injection  below, you ma Summary Notice (MSN). I understand that if Medicare doesn’t pay, I am responsible for payment, but I can appeal to Medicare by following the directions on the MSN.  If Medicare does pay, you will refund any payments I made to you, less co-pays or deductible

## (undated) NOTE — LETTER
OPIOID TREATMENT AGREEMENT    For Pain Management      Please read each statement, initial at the bottom of each page, and sign the last page to indicate your agreement with this form.  If you have any questions about any information in this form or the b. I understand that decreasing or stopping my medication without the close supervision of my physician can lead to withdrawal. Withdrawal symptoms can include yawning, sweating, watery eyes, runny nose, anxiety, tremors, aching muscles, hot and cold flash 7. I understand that any evidence of drug hoarding, acquisition of any opioid medication or adjunctive analgesia from other physicians (which includes emergency rooms), uncontrolled dose escalation or reduction, loss of prescriptions or failure to follow a d. Refills issued by physicians/health care providers in this clinic can only be filled by a pharmacy in the [place], even if I am a resident of another [facility].    e. Prescriptions for pain medicine or any other prescriptions will be written only b. Addiction is a primary, chronic neurobiologic disease with genetic, psychosocial and environmental factors influencing its development and manifestation.   It is characterized by behavior that includes one or more of the following: impaired control over 14. I agree and understand that my physician reserves the right to perform random or unannounced urine drug testing. If requested to provide a urine sample, I agree to cooperate.   If I decide not to provide a urine sample, I understand that my physician m ______________________________    ____________    ______________________________              Patient Signature                                  Date                        Patient Printed Name  ______________________________    ____________    ___________

## (undated) NOTE — LETTER
[facility]  Amos Reddy 59 [address] [facility], [address] [place] [address]    Consent for Operation    Date: __________________    Time: _______________    1.  I authorize the performance upon Sasha Wheat the following operation:    Procedure(s):  INTRATHECAL PUMP AND revealed by the pictures or by descriptive texts accompanying them. If the procedure has been videotaped, the surgeon will obtain the original videotape. The hospital will not be responsible for storage or maintenance of this tape.     6. For the purpose of THAT MY DOCTOR PROVIDED ME WITH THE ABOVE EXPLANATIONS, THAT ALL BLANKS OR STATEMENTS REQUIRING INSERTION OR COMPLETION WERE FILLED IN.     Signature of Patient:   ___________________________    When the patient is a minor or mentally incompetent to give co supplements, and pills I can buy without a prescription (including street drugs/illegal medications). Failure to inform my anesthesiologist about these medicines may increase my risk of anesthetic complications.   · If I am allergic to anything or have had Anesthesiologist Signature     Date   Time  I have discussed the procedure and information above with the patient (or patient’s representative) and answered their questions. The patient or their representative has agreed to have anesthesia services.     ___

## (undated) NOTE — LETTER
6/27/2024      Homer Gibson MD  Physical Medicine and Rehabilitation  [address] [place] [address], [address]  [facility] [address]  Dept: [phone]  Dept Fax: [phone]        RE: Consultation for Karine Ramesh        Dear Magno Hoffmann MD,    Thank you very much for the opportunity to see your patient.  Attached please find a summary from your patient's recent visit.     I appreciate the chance to take care of your patient with you.  Please feel free to call me with any questions or concerns.    Sincerely,        Homer Gibson MD  Electronically Signed on 6/27/2024

## (undated) NOTE — LETTER
AUTHORIZATION FOR SURGICAL OPERATION OR OTHER PROCEDURE    1.  I hereby authorize Dr. Domonique Fernandez and the [facility] staff assigned to my case to perform the following operation and/or procedure at the [facility]:    Left greater trochanteric bursal injecti []  Parent    Responsible person                          []  Spouse  In case of minor or                    [] Other  _____________   Incompetent name:  __________________________________________________                               (please prin

## (undated) NOTE — LETTER
Patient Name: Jomar Pate        : 10/21/1965       Medical Record #: [id number]    CONSENT FOR PROCEDURES/SEDATION    Date: 2018       Time: 12:54 PM        1.  I authorize the performance upon Jomar Pate the following:    Intrathecal pu

## (undated) NOTE — LETTER
Patient Name: Geovani Thomas        : 10/21/1965       Medical Record #: [id number]    CONSENT FOR PROCEDURES/SEDATION    Date: 2018       Time: 9:22 AM        1.  I authorize the performance upon Geovani Thomas the following:    Intrathecal infu

## (undated) NOTE — LETTER
Patient Name: Karine Ramesh        : 10/21/1965       Medical Record #: [id number]    CONSENT FOR PROCEDURES/SEDATION    Date: 2025       Time: 1:02 PM        1. I authorize the performance upon Karine Ramesh the following:    ___________INTRATHECAL PUMP REFILL              _________________________    2. I authorize Dr. Parsons (and whomever is designated as the doctor’s assistant), to perform the above mentioned procedures.    3. If any unforeseen conditions arise during this procedure calling for additional procedures, operations, or medications (including anesthesia and blood transfusion), I  further request and authorize the doctor to do whatever he/she deems advisable in my interest.    4. I consent to the taking and reproduction of any photographs in the course of this procedure for professional purposes.    5. I consent to the administration of such sedation as may be considered necessary or advisable by the physician responsible for this service, with the exception of  _____________________________.    6. I have been informed by my doctor of the nature and purpose of this procedure/sedation, possible alternative methods of treatment, risk involved and possible complications.      Signature of Patient:  ___________________________    Signature of person authorized to consent for patient: Relationship to patient:  ___________________________    ___________________    Witness: ____________________     Date: ______________    Provider: ____________________     Date: ______________

## (undated) NOTE — IP AVS SNAPSHOT
[facility] [place] [address] [place], [address] ~ [phone]                Discharge Summary   6/9/2017    Tim Ugalde           Admission Information        Provider Department    6/9/2017 Deion Hooper MD  3sw-A Morning Afternoon Evening As Needed    docusate sodium 100 MG Caps   Last time this was given:  100 mg on 6/12/2017  8:24 AM   Commonly known as:  COLACE        Take 100 mg by mouth daily.                                estradiol 1 MG Tabs   Last time this No lifting anything greater than a gallon of milk.   Do not over extend/flex/rotate your back  No driving until seen by Dr. Jake Cordova in the clinic or while taking narcotics  No NSAID's, Aspirin or Fish Oil products for 7 days after surgery  Notify Surgeon i # of days to admit prior to surgery:      Special Equipment or Implants needed?:  Yes    Special Equipment / Implants needed:  [employer] rep; Morphine 10mg/mL in 40mL; dosing of 1mg/day    C-ARM Needed:       Additional scheduling instructions:  (need 6 h (06/08/17)  7.5 (06/08/17)  4.87 (06/08/17)  15.3 (06/08/17)  44.3 (06/08/17)  90.9    (06/08/17)  283       Recent Hematology Lab Results (cont.)  (Last 3 results in the past 90 days)    Neutrophil % Lymphocyte % Monocyte % Eosinophil % Basophil % Prelim If you've recently had a stay at the Hospital you can access your discharge instructions in [employer] by going to Visits < Admission Summaries.  If you've been to the Emergency Department or your doctor's office, you can view your past visit information in My What to report to your healthcare team: Stomach upset, unresolved pain           GI Medications     docusate sodium 100 MG Oral Cap       Use:  Nausea/vomiting, acid reflux, low bowel motility, stomach pain   Most common side effects:  Depends on the speci

## (undated) NOTE — Clinical Note
AUTHORIZATION FOR SURGICAL OPERATION OR OTHER PROCEDURE    1.  I hereby authorize Dr. Don Madera and the [facility] staff assigned to my case to perform the following operation and/or procedure at the [facility]:    Left greater trochanteric bursal steroid Relationship to Patient:             Parent    Responsible person                            Spouse  In case of minor or                     Other  _____________   Incompetent name:  __________________________________________________

## (undated) NOTE — LETTER
[facility]  Amos Reddy 59 [address] [facility], [address] [place] [address]    Consent for Operation    Date: __________________    Time: _______________    1.  I authorize the performance upon Daquan Nelson the following operation:    Procedure(s):  INTRATHECAL PUMP AND revealed by the pictures or by descriptive texts accompanying them. If the procedure has been videotaped, the surgeon will obtain the original videotape. The hospital will not be responsible for storage or maintenance of this tape.     6. For the purpose of THAT MY DOCTOR PROVIDED ME WITH THE ABOVE EXPLANATIONS, THAT ALL BLANKS OR STATEMENTS REQUIRING INSERTION OR COMPLETION WERE FILLED IN.     Signature of Patient:   ___________________________    When the patient is a minor or mentally incompetent to give co supplements, and pills I can buy without a prescription (including street drugs/illegal medications). Failure to inform my anesthesiologist about these medicines may increase my risk of anesthetic complications.   · If I am allergic to anything or have had Anesthesiologist Signature     Date   Time  I have discussed the procedure and information above with the patient (or patient’s representative) and answered their questions. The patient or their representative has agreed to have anesthesia services.     ___

## (undated) NOTE — MR AVS SNAPSHOT
[facility]  [address], [address] [facility]  Misael Engle [phone]               Thank you for choosing us for your health care visit with Thania Tatum MD.  We are glad to serve you and happy to provide you with this summary of you Vicoprofen. What this means for you as our patient:  · We can no longer fax or phone in these prescriptions to your pharmacy.   · These prescriptions will have to be printed out by your physician, signed by the physician, picked up in our office and phys Today's Vital Signs     BP Pulse Height             114/66 mmHg 107 62\"            Current Medications          This list is accurate as of: 3/31/17 11:59 PM.  Always use your most recent med list.                diazepam 10 MG Tabs   TAKE 1 TABLET BY CLARITA Bring a paper prescription for each of these medications    - HYDROcodone-acetaminophen  MG Tabs  - HYDROcodone-acetaminophen  MG Tabs  - HYDROcodone-acetaminophen  MG Tabs            Medications Administered in the Office Today     Lido

## (undated) NOTE — LETTER
Patient Name: Sherrell Sebastian        : 10/21/1965       Medical Record #: [id number]    CONSENT FOR PROCEDURES/SEDATION    Date: 3/2/2021       Time: 8:21 AM        1.  I authorize the performance upon Sherrell Sebastian the following:    _______ Beulah Montes

## (undated) NOTE — LETTER
Patient Name: Karine Ramesh        : 10/21/1965       Medical Record #: [id number]    CONSENT FOR PROCEDURES/SEDATION    Date: 2024       Time: 8:00 AM        1. I authorize the performance upon Karine Ramesh the following:    ________INTRATHECAL PUMP REFILL                                         __________________    2. I authorize Mick MAKI (and whomever is designated as the doctor’s assistant), to perform the above mentioned procedures.    3. If any unforeseen conditions arise during this procedure calling for additional procedures, operations, or medications (including anesthesia and blood transfusion), I  further request and authorize the doctor to do whatever he/she deems advisable in my interest.    4. I consent to the taking and reproduction of any photographs in the course of this procedure for professional purposes.    5. I consent to the administration of such sedation as may be considered necessary or advisable by the physician responsible for this service, with the exception of  _____________________________.    6. I have been informed by my doctor of the nature and purpose of this procedure/sedation, possible alternative methods of treatment, risk involved and possible complications.      Signature of Patient:  ___________________________    Signature of person authorized to consent for patient: Relationship to patient:  ___________________________    ___________________    Witness: ____________________     Date: ______________    Provider: ____________________     Date: ______________

## (undated) NOTE — LETTER
Patient Name: Sergei Miller        : 10/21/1965       Medical Record #: [id number]    CONSENT FOR PROCEDURES/SEDATION    Date: 2020       Time: 1:30 PM        1.  I authorize the performance upon Sergei Miller the following:    ______Intrathec

## (undated) NOTE — LETTER
Patient Name: Kenny Pena        : 10/21/1965       Medical Record #: [id number]    CONSENT FOR PROCEDURES/SEDATION    Date: 10/11/2017       Time: 11:18 AM        1.  I authorize the performance upon Kenny Pena the following:    Intrathecal i

## (undated) NOTE — LETTER
7/16/2025              Karine Ramesh        [address] [facility] [address]         To whom it may concern,    Karine Ramesh is currently a patient under my medical care.  The patient's medical condition makes serving on jury duty inadvisable at this time.  Please excuse them from serving.  If you require additional information please do not hesitate to contact my office.        Sincerely,    Homer Gibson MD  [address] [address]  [phone]

## (undated) NOTE — LETTER
Patient Name: Daquan Nelson        : 10/21/1965       Medical Record #: [id number]    CONSENT FOR PROCEDURES/SEDATION    Date: 2021       Time: 8:03 AM        1.  I authorize the performance upon Daquan Nelson the following:    _______________

## (undated) NOTE — LETTER
9/30/2019      Arie Rogers MD  Physical Medicine and Rehabilitation  [address] [address]  Dept: [phone]  Dept Fax: [phone]        RE: Consultation for Nicole Billy        Dear Sonja Zaldivar MD,    Thank

## (undated) NOTE — LETTER
ALE Notifier: Yariel/[employer]   DEBORAH. Patient Name: Amando Ferreira. Identification Number: [id number]      Advance Beneficiary Notice of Noncoverage (ABN)  NOTE:  If Medicare doesn’t pay for D. Left greater trochanteric bursal injectionbelow, you may I made to you, less co-pays or deductibles. ? OPTION 2. I want the D. Left greater trochanteric bursal injectionlisted above, but do not bill Medicare. You may ask to be paid now as I am responsible for payment. I cannot appeal if Medicare is not billed.

## (undated) NOTE — LETTER
Patient Name: Karine Ramesh        : 10/21/1965       Medical Record #: [id number]    CONSENT FOR PROCEDURES/SEDATION    Date: 2025       Time: 7:52 AM        1. I authorize the performance upon Karine Ramesh the following:    __________INTRATHECAL PUMP REFILL______________    2. I authorize GLYNN KENNEY(and whomever is designated as the doctor’s assistant), to perform the above mentioned procedures.    3. If any unforeseen conditions arise during this procedure calling for additional procedures, operations, or medications (including anesthesia and blood transfusion), I  further request and authorize the doctor to do whatever he/she deems advisable in my interest.    4. I consent to the taking and reproduction of any photographs in the course of this procedure for professional purposes.    5. I consent to the administration of such sedation as may be considered necessary or advisable by the physician responsible for this service, with the exception of  _____________________________.    6. I have been informed by my doctor of the nature and purpose of this procedure/sedation, possible alternative methods of treatment, risk involved and possible complications.      Signature of Patient:  ___________________________    Signature of person authorized to consent for patient: Relationship to patient:  ___________________________    ___________________    Witness: ____________________     Date: ______________    Provider: ____________________     Date: ______________

## (undated) NOTE — LETTER
Patient Name: Arnol Gaviria        : 10/21/1965       Medical Record #: [id number]    CONSENT FOR PROCEDURES/SEDATION    Date: 2019       Time: 8:26 AM        1.  I authorize the performance upon Arnol Gaviria the following:    Intrathecal infu

## (undated) NOTE — LETTER
Patient Name: Ananda Sparks        : 10/21/1965       Medical Record #: [id number]    CONSENT FOR PROCEDURES/SEDATION    Date: 2021       Time: 7:59 AM        1.  I authorize the performance upon Ananda Sparks the following:    ___________Intr

## (undated) NOTE — LETTER
Patient Name: Sergei Miller        : 10/21/1965       Medical Record #: [id number]    CONSENT FOR PROCEDURES/SEDATION    Date: 10/30/2019       Time: 9:10 AM        1.  I authorize the performance upon Sergei Miller the following:    Intrathecal in

## (undated) NOTE — LETTER
Patient Name: Sherrell Sebastian        : 10/21/1965       Medical Record #: [id number]    CONSENT FOR PROCEDURES/SEDATION    Date: 3/20/2019       Time: 1:25 PM        1.  I authorize the performance upon Sherrell Sebastian the following:    Intrathecal pum

## (undated) NOTE — LETTER
10/17/2017      Adria Whitaker MD  Physical Medicine and Rehabilitation  [address] [address]  Dept: [phone]  Dept Fax: [phone]        RE: Consultation for Sherrell Sebastian        Dear Andrew Johnson MD,    Thank

## (undated) NOTE — LETTER
Patient Name: Lilli Joya        : 10/21/1965       Medical Record #: [id number]    CONSENT FOR PROCEDURES/SEDATION    Date: 10/20/2020       Time: 3:24 PM        1.  I authorize the performance upon Lilli Joya the following:    Intrathecal in

## (undated) NOTE — LETTER
Patient Name: Benny House        : 10/21/1965       Medical Record #: [id number]    CONSENT FOR PROCEDURES/SEDATION    Date: 2018       Time: 1:15 PM        1.  I authorize the performance upon Benny House the following:    Intrathecal mor